# Patient Record
Sex: MALE
[De-identification: names, ages, dates, MRNs, and addresses within clinical notes are randomized per-mention and may not be internally consistent; named-entity substitution may affect disease eponyms.]

---

## 2020-10-15 ENCOUNTER — HOSPITAL ENCOUNTER (EMERGENCY)
Dept: HOSPITAL 53 - M ED | Age: 25
Discharge: HOME | End: 2020-10-15
Payer: SELF-PAY

## 2020-10-15 VITALS — WEIGHT: 156.09 LBS | HEIGHT: 75 IN | BODY MASS INDEX: 19.41 KG/M2

## 2020-10-15 VITALS — SYSTOLIC BLOOD PRESSURE: 131 MMHG | DIASTOLIC BLOOD PRESSURE: 62 MMHG

## 2020-10-15 DIAGNOSIS — R36.9: ICD-10-CM

## 2020-10-15 DIAGNOSIS — J45.909: ICD-10-CM

## 2020-10-15 DIAGNOSIS — R30.0: Primary | ICD-10-CM

## 2020-10-15 DIAGNOSIS — Z88.0: ICD-10-CM

## 2020-10-15 LAB
CHLAMYDIA DNA AMPLIFICATION: NEGATIVE
N GONORRHOEA RRNA SPEC QL NAA+PROBE: POSITIVE

## 2020-10-15 PROCEDURE — 87661 TRICHOMONAS VAGINALIS AMPLIF: CPT

## 2020-10-15 PROCEDURE — 99283 EMERGENCY DEPT VISIT LOW MDM: CPT

## 2020-10-15 PROCEDURE — 96372 THER/PROPH/DIAG INJ SC/IM: CPT

## 2020-12-01 ENCOUNTER — HOSPITAL ENCOUNTER (OUTPATIENT)
Dept: HOSPITAL 53 - M LAB REF | Age: 25
End: 2020-12-01
Attending: PHYSICIAN ASSISTANT
Payer: MEDICAID

## 2020-12-01 DIAGNOSIS — Z11.3: Primary | ICD-10-CM

## 2021-02-22 ENCOUNTER — HOSPITAL ENCOUNTER (EMERGENCY)
Dept: HOSPITAL 53 - M ED | Age: 26
LOS: 1 days | Discharge: HOME | End: 2021-02-23
Payer: COMMERCIAL

## 2021-02-22 VITALS — BODY MASS INDEX: 19.92 KG/M2 | WEIGHT: 142.31 LBS | HEIGHT: 71 IN

## 2021-02-22 DIAGNOSIS — F19.10: ICD-10-CM

## 2021-02-22 DIAGNOSIS — Z88.0: ICD-10-CM

## 2021-02-22 DIAGNOSIS — F22: Primary | ICD-10-CM

## 2021-02-23 VITALS — SYSTOLIC BLOOD PRESSURE: 132 MMHG | DIASTOLIC BLOOD PRESSURE: 62 MMHG

## 2021-02-23 LAB
ALBUMIN SERPL BCG-MCNC: 4.2 GM/DL (ref 3.2–5.2)
ALT SERPL W P-5'-P-CCNC: 32 U/L (ref 12–78)
AMPHETAMINES UR QL SCN: POSITIVE
APAP SERPL-MCNC: < 2 UG/ML (ref 10–30)
BARBITURATES UR QL SCN: NEGATIVE
BENZODIAZ UR QL SCN: NEGATIVE
BILIRUB CONJ SERPL-MCNC: 0.2 MG/DL (ref 0–0.2)
BILIRUB SERPL-MCNC: 0.7 MG/DL (ref 0.2–1)
BUN SERPL-MCNC: 11 MG/DL (ref 7–18)
BZE UR QL SCN: NEGATIVE
CALCIUM SERPL-MCNC: 9.5 MG/DL (ref 8.5–10.1)
CANNABINOIDS UR QL SCN: POSITIVE
CHLORIDE SERPL-SCNC: 101 MEQ/L (ref 98–107)
CO2 SERPL-SCNC: 31 MEQ/L (ref 21–32)
CREAT SERPL-MCNC: 1.24 MG/DL (ref 0.7–1.3)
ETHANOL SERPL-MCNC: < 0.003 % (ref 0–0.01)
GFR SERPL CREATININE-BSD FRML MDRD: > 60 ML/MIN/{1.73_M2} (ref 60–?)
GLUCOSE SERPL-MCNC: 110 MG/DL (ref 70–100)
HCT VFR BLD AUTO: 44 % (ref 42–52)
HGB BLD-MCNC: 14.6 G/DL (ref 13.5–17.5)
MCH RBC QN AUTO: 31.2 PG (ref 27–33)
MCHC RBC AUTO-ENTMCNC: 33.2 G/DL (ref 32–36.5)
MCV RBC AUTO: 94 FL (ref 80–96)
METHADONE UR QL SCN: NEGATIVE
OPIATES UR QL SCN: NEGATIVE
PCP UR QL SCN: NEGATIVE
PLATELET # BLD AUTO: 314 10^3/UL (ref 150–450)
POTASSIUM SERPL-SCNC: 4.9 MEQ/L (ref 3.5–5.1)
PROT SERPL-MCNC: 7.5 GM/DL (ref 6.4–8.2)
RBC # BLD AUTO: 4.68 10^6/UL (ref 4.3–6.1)
SALICYLATES SERPL-MCNC: < 1.7 MG/DL (ref 5–30)
SODIUM SERPL-SCNC: 138 MEQ/L (ref 136–145)
TSH SERPL DL<=0.005 MIU/L-ACNC: 1.25 UIU/ML (ref 0.36–3.74)
WBC # BLD AUTO: 11.7 10^3/UL (ref 4–10)

## 2021-03-04 ENCOUNTER — HOSPITAL ENCOUNTER (EMERGENCY)
Dept: HOSPITAL 53 - M ED | Age: 26
Discharge: HOME | End: 2021-03-04
Payer: COMMERCIAL

## 2021-03-04 VITALS — HEIGHT: 69 IN | BODY MASS INDEX: 20.78 KG/M2 | WEIGHT: 140.3 LBS

## 2021-03-04 VITALS — DIASTOLIC BLOOD PRESSURE: 83 MMHG | SYSTOLIC BLOOD PRESSURE: 131 MMHG

## 2021-03-04 DIAGNOSIS — F19.10: Primary | ICD-10-CM

## 2021-03-04 DIAGNOSIS — F17.200: ICD-10-CM

## 2021-03-04 DIAGNOSIS — Z88.0: ICD-10-CM

## 2021-03-04 LAB
ALBUMIN SERPL BCG-MCNC: 3.9 GM/DL (ref 3.2–5.2)
ALT SERPL W P-5'-P-CCNC: 37 U/L (ref 12–78)
APAP SERPL-MCNC: < 2 UG/ML (ref 10–30)
BILIRUB CONJ SERPL-MCNC: 0.1 MG/DL (ref 0–0.2)
BILIRUB SERPL-MCNC: 0.3 MG/DL (ref 0.2–1)
BUN SERPL-MCNC: 11 MG/DL (ref 7–18)
CALCIUM SERPL-MCNC: 9 MG/DL (ref 8.5–10.1)
CHLORIDE SERPL-SCNC: 106 MEQ/L (ref 98–107)
CO2 SERPL-SCNC: 34 MEQ/L (ref 21–32)
CREAT SERPL-MCNC: 0.84 MG/DL (ref 0.7–1.3)
ETHANOL SERPL-MCNC: < 0.003 % (ref 0–0.01)
GFR SERPL CREATININE-BSD FRML MDRD: > 60 ML/MIN/{1.73_M2} (ref 60–?)
GLUCOSE SERPL-MCNC: 76 MG/DL (ref 70–100)
HCT VFR BLD AUTO: 41.5 % (ref 42–52)
HGB BLD-MCNC: 13.9 G/DL (ref 13.5–17.5)
MCH RBC QN AUTO: 31.5 PG (ref 27–33)
MCHC RBC AUTO-ENTMCNC: 33.5 G/DL (ref 32–36.5)
MCV RBC AUTO: 94.1 FL (ref 80–96)
PLATELET # BLD AUTO: 273 10^3/UL (ref 150–450)
POTASSIUM SERPL-SCNC: 3.7 MEQ/L (ref 3.5–5.1)
PROT SERPL-MCNC: 6.7 GM/DL (ref 6.4–8.2)
RBC # BLD AUTO: 4.41 10^6/UL (ref 4.3–6.1)
SALICYLATES SERPL-MCNC: < 1.7 MG/DL (ref 5–30)
SODIUM SERPL-SCNC: 144 MEQ/L (ref 136–145)
TSH SERPL DL<=0.005 MIU/L-ACNC: 0.7 UIU/ML (ref 0.36–3.74)
WBC # BLD AUTO: 8.2 10^3/UL (ref 4–10)

## 2021-04-22 ENCOUNTER — HOSPITAL ENCOUNTER (EMERGENCY)
Dept: HOSPITAL 53 - M ED | Age: 26
Discharge: HOME | End: 2021-04-22
Payer: COMMERCIAL

## 2021-04-22 VITALS — WEIGHT: 148.37 LBS | BODY MASS INDEX: 20.1 KG/M2 | HEIGHT: 72 IN

## 2021-04-22 VITALS — SYSTOLIC BLOOD PRESSURE: 135 MMHG | DIASTOLIC BLOOD PRESSURE: 57 MMHG
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
## 2021-04-22 DIAGNOSIS — S02.2XXA: Primary | ICD-10-CM
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
## 2021-04-22 DIAGNOSIS — F17.200: ICD-10-CM
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
## 2021-04-22 DIAGNOSIS — Z88.0: ICD-10-CM

## 2021-04-22 DIAGNOSIS — F12.10: ICD-10-CM

## 2021-04-22 NOTE — REPVR
PROCEDURE INFORMATION: 

Exam: CT Maxillofacial Without Contrast 

Exam date and time: 4/22/2021 5:59 PM 

Age: 25 years old 

Clinical indication: Injury or trauma; Other: Hit playing football; Blunt 

trauma (contusions or hematomas); Nose; Additional info: Pain over bridge of 

nose, hit playing football 4-5 days ago 



TECHNIQUE: 

Imaging protocol: Computed tomography images of the face without contrast. 

Radiation optimization: All CT scans at this facility use at least one of these 

dose optimization techniques: automated exposure control; mA and/or kV 

adjustment per patient size (includes targeted exams where dose is matched to 

clinical indication); or iterative reconstruction. 



COMPARISON: 

No relevant prior studies available. 



FINDINGS: 

Orbital cavity: Normal appearing orbits. 

Bones/joints: There is a complex fracture of the left nasal ala with 3 mm of 

inward displacement of the distal fracture fragment.  The fracture line extends 

through the midline nasal bone near the tip In addition in this area there is 

severe soft tissue swelling. The orbital floors appears intact. 

Paranasal sinuses: Clear paranasal sinuses. 

Mastoid air cells: Clear mastoid air cells. 





IMPRESSION: 

Complex fracture of the left nasal ala with 3 mm of offset. The fracture line 

extends through the midline nasal bone is well near the tip. There is marked 

soft tissue swelling left side of the nose. 



Electronically signed by: Gildardo Shah On 04/22/2021  18:45:17 PM

## 2021-05-03 ENCOUNTER — HOSPITAL ENCOUNTER (OUTPATIENT)
Dept: HOSPITAL 53 - M LAB REF | Age: 26
End: 2021-05-03
Attending: NURSE PRACTITIONER
Payer: COMMERCIAL

## 2021-05-03 DIAGNOSIS — F41.9: ICD-10-CM

## 2021-05-03 DIAGNOSIS — Z00.00: Primary | ICD-10-CM

## 2021-05-03 DIAGNOSIS — F17.200: ICD-10-CM

## 2021-05-03 LAB
25(OH)D3 SERPL-MCNC: 24 NG/ML (ref 30–100)
ALBUMIN SERPL BCG-MCNC: 3.8 GM/DL (ref 3.2–5.2)
ALT SERPL W P-5'-P-CCNC: 162 U/L (ref 12–78)
BASOPHILS # BLD AUTO: 0.1 10^3/UL (ref 0–0.2)
BASOPHILS NFR BLD AUTO: 0.7 % (ref 0–1)
BILIRUB SERPL-MCNC: 0.5 MG/DL (ref 0.2–1)
BUN SERPL-MCNC: 11 MG/DL (ref 7–18)
CALCIUM SERPL-MCNC: 9.2 MG/DL (ref 8.5–10.1)
CHLORIDE SERPL-SCNC: 106 MEQ/L (ref 98–107)
CHOLEST SERPL-MCNC: 145 MG/DL (ref ?–200)
CHOLEST/HDLC SERPL: 2.23 {RATIO} (ref ?–5)
CO2 SERPL-SCNC: 33 MEQ/L (ref 21–32)
CREAT SERPL-MCNC: 0.83 MG/DL (ref 0.7–1.3)
EOSINOPHIL # BLD AUTO: 0.1 10^3/UL (ref 0–0.5)
EOSINOPHIL NFR BLD AUTO: 1.6 % (ref 0–3)
GFR SERPL CREATININE-BSD FRML MDRD: > 60 ML/MIN/{1.73_M2} (ref 60–?)
GLUCOSE SERPL-MCNC: 88 MG/DL (ref 70–100)
HCT VFR BLD AUTO: 47.2 % (ref 42–52)
HCV AB SER QL: > 11 INDEX (ref ?–0.8)
HDLC SERPL-MCNC: 65 MG/DL (ref 40–?)
HGB BLD-MCNC: 14.9 G/DL (ref 13.5–17.5)
HIV 1+2 AB+HIV1 P24 AG SERPL QL IA: NEGATIVE
LDLC SERPL CALC-MCNC: 71 MG/DL (ref ?–100)
LYMPHOCYTES # BLD AUTO: 1.3 10^3/UL (ref 1.5–5)
LYMPHOCYTES NFR BLD AUTO: 17.3 % (ref 24–44)
MCH RBC QN AUTO: 31.3 PG (ref 27–33)
MCHC RBC AUTO-ENTMCNC: 31.6 G/DL (ref 32–36.5)
MCV RBC AUTO: 99.2 FL (ref 80–96)
MONOCYTES # BLD AUTO: 0.7 10^3/UL (ref 0–0.8)
MONOCYTES NFR BLD AUTO: 8.9 % (ref 2–8)
NEUTROPHILS # BLD AUTO: 5.5 10^3/UL (ref 1.5–8.5)
NEUTROPHILS NFR BLD AUTO: 71.2 % (ref 36–66)
NONHDLC SERPL-MCNC: 80 MG/DL
PLATELET # BLD AUTO: 335 10^3/UL (ref 150–450)
POTASSIUM SERPL-SCNC: 4.6 MEQ/L (ref 3.5–5.1)
PROT SERPL-MCNC: 7.9 GM/DL (ref 6.4–8.2)
RBC # BLD AUTO: 4.76 10^6/UL (ref 4.3–6.1)
SODIUM SERPL-SCNC: 141 MEQ/L (ref 136–145)
T4 FREE SERPL-MCNC: 1.25 NG/DL (ref 0.76–1.46)
TRIGL SERPL-MCNC: 45 MG/DL (ref ?–150)
TSH SERPL DL<=0.005 MIU/L-ACNC: 0.54 UIU/ML (ref 0.36–3.74)
WBC # BLD AUTO: 7.7 10^3/UL (ref 4–10)

## 2021-05-06 ENCOUNTER — HOSPITAL ENCOUNTER (EMERGENCY)
Dept: HOSPITAL 53 - M ED | Age: 26
LOS: 1 days | Discharge: LEFT BEFORE DISCHARGE | End: 2021-05-07
Payer: COMMERCIAL

## 2021-05-06 VITALS — DIASTOLIC BLOOD PRESSURE: 64 MMHG | SYSTOLIC BLOOD PRESSURE: 132 MMHG

## 2021-05-06 VITALS — WEIGHT: 140.43 LBS | BODY MASS INDEX: 19.02 KG/M2 | HEIGHT: 72 IN

## 2021-05-06 DIAGNOSIS — Z53.21: Primary | ICD-10-CM

## 2021-05-07 ENCOUNTER — HOSPITAL ENCOUNTER (EMERGENCY)
Dept: HOSPITAL 53 - M ED | Age: 26
Discharge: HOME | End: 2021-05-07
Payer: COMMERCIAL

## 2021-05-07 VITALS — HEIGHT: 72 IN | BODY MASS INDEX: 18.72 KG/M2 | WEIGHT: 138.23 LBS

## 2021-05-07 VITALS — SYSTOLIC BLOOD PRESSURE: 109 MMHG | DIASTOLIC BLOOD PRESSURE: 53 MMHG

## 2021-05-07 DIAGNOSIS — J45.909: ICD-10-CM

## 2021-05-07 DIAGNOSIS — S02.2XXA: ICD-10-CM
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
## 2021-05-07 DIAGNOSIS — Z88.0: ICD-10-CM

## 2021-05-07 DIAGNOSIS — L03.90: ICD-10-CM

## 2021-05-07 DIAGNOSIS — F19.10: Primary | ICD-10-CM

## 2021-05-07 LAB
ALBUMIN SERPL BCG-MCNC: 3.9 GM/DL (ref 3.2–5.2)
ALT SERPL W P-5'-P-CCNC: 181 U/L (ref 12–78)
APAP SERPL-MCNC: < 2 UG/ML (ref 10–30)
BILIRUB CONJ SERPL-MCNC: 0.2 MG/DL (ref 0–0.2)
BILIRUB SERPL-MCNC: 0.6 MG/DL (ref 0.2–1)
BUN SERPL-MCNC: 19 MG/DL (ref 7–18)
CALCIUM SERPL-MCNC: 8.7 MG/DL (ref 8.5–10.1)
CHLORIDE SERPL-SCNC: 105 MEQ/L (ref 98–107)
CO2 SERPL-SCNC: 25 MEQ/L (ref 21–32)
CREAT SERPL-MCNC: 1.31 MG/DL (ref 0.7–1.3)
ETHANOL SERPL-MCNC: < 0.003 % (ref 0–0.01)
GFR SERPL CREATININE-BSD FRML MDRD: > 60 ML/MIN/{1.73_M2} (ref 60–?)
GLUCOSE SERPL-MCNC: 44 MG/DL (ref 70–100)
HCT VFR BLD AUTO: 43 % (ref 42–52)
HGB BLD-MCNC: 14.2 G/DL (ref 13.5–17.5)
MCH RBC QN AUTO: 31.6 PG (ref 27–33)
MCHC RBC AUTO-ENTMCNC: 33 G/DL (ref 32–36.5)
MCV RBC AUTO: 95.6 FL (ref 80–96)
PLATELET # BLD AUTO: 358 10^3/UL (ref 150–450)
POTASSIUM SERPL-SCNC: 3.9 MEQ/L (ref 3.5–5.1)
PROT SERPL-MCNC: 8 GM/DL (ref 6.4–8.2)
RBC # BLD AUTO: 4.5 10^6/UL (ref 4.3–6.1)
SALICYLATES SERPL-MCNC: 3.1 MG/DL (ref 5–30)
SODIUM SERPL-SCNC: 140 MEQ/L (ref 136–145)
TSH SERPL DL<=0.005 MIU/L-ACNC: 2.06 UIU/ML (ref 0.36–3.74)
WBC # BLD AUTO: 14.9 10^3/UL (ref 4–10)

## 2021-05-07 PROCEDURE — 93005 ELECTROCARDIOGRAM TRACING: CPT

## 2021-05-07 PROCEDURE — 96375 TX/PRO/DX INJ NEW DRUG ADDON: CPT

## 2021-05-07 PROCEDURE — 70486 CT MAXILLOFACIAL W/O DYE: CPT

## 2021-05-07 PROCEDURE — 85027 COMPLETE CBC AUTOMATED: CPT

## 2021-05-07 PROCEDURE — 99285 EMERGENCY DEPT VISIT HI MDM: CPT

## 2021-05-07 PROCEDURE — 82077 ASSAY SPEC XCP UR&BREATH IA: CPT

## 2021-05-07 PROCEDURE — 96374 THER/PROPH/DIAG INJ IV PUSH: CPT

## 2021-05-07 PROCEDURE — 70450 CT HEAD/BRAIN W/O DYE: CPT

## 2021-05-07 PROCEDURE — 80143 DRUG ASSAY ACETAMINOPHEN: CPT

## 2021-05-07 PROCEDURE — 84443 ASSAY THYROID STIM HORMONE: CPT

## 2021-05-07 PROCEDURE — 80048 BASIC METABOLIC PNL TOTAL CA: CPT

## 2021-05-07 PROCEDURE — 36415 COLL VENOUS BLD VENIPUNCTURE: CPT

## 2021-05-07 PROCEDURE — 80076 HEPATIC FUNCTION PANEL: CPT

## 2021-05-07 NOTE — ECGEPIP
[facility] - ED

                                       

                                       Test Date:    2021

Pat Name:     JUWAN HARO     Department:   

Patient ID:   [id number]                 Room:         -

Gender:       Male                     Technician:   

:          1995               Requested By: KRISTINA GALVAN

Order Number: [id number]     Reading MD:   Ascencion Ballard

                                 Measurements

Intervals                              Axis          

Rate:         73                       P:            81

AK:           176                      QRS:          96

QRSD:         98                       T:            71

QT:           426                                    

QTc:          469                                    

                           Interpretive Statements

Normal sinus rhythm with sinus arrhythmia

Right atrial enlargement

Rightward axis

POOR R WAVE PROGRESSION

BENIGN EARLY REPOLARIZATION

NO PRIORS FOR COMPARISON

Electronically Signed on 2021 18:41:28 EDT by Ascencion Ballard

## 2021-05-07 NOTE — REP
INDICATION:

possible trauma.



COMPARISON:

NONE.



TECHNIQUE:

Helical scanning is acquired and 3 mm axial images re-formatted.  Coronal MPR images

are generated and reviewed.



FINDINGS:

There is a comminuted slightly depressed fracture of the nasal bone with slight

overlying soft tissue swelling.  Inferior maxillary spine appears intact.  Zygomatic

arches are normal.  Maxillary sinuses are clear.  Maxillary and bony is orbital

margins are intact.  Frontal sinuses are clear and its walls are intact.  No ethmoid

or sphenoid sinus opacification is seen.  No skull base fracture is appreciated.  The

mandible is intact.



IMPRESSION:

There is a slightly depressed comminuted fracture of the nasal bone.  No other facial

fracture is visible.





<Electronically signed by Reji Aguayo > 05/07/21 [id number]

## 2021-05-07 NOTE — REP
INDICATION:

possible trauma.



COMPARISON:

None.



TECHNIQUE:

Helical scanning is acquired. 5 mm axial images were reformatted.  Coronal MPR images

were generated.



FINDINGS:

Bone window settings demonstrate an intact bony calvarium.  There is no evidence of

skull fracture or incidental bony calvarial lesion.  The visualized paranasal sinuses

appear clear.  No intraorbital abnormality is seen.  On soft tissue window setting

images; the lateral, third, and fourth ventricles are normal in size and position.

Gray-white differentiation pattern is normal above and below the tentorium.  There are

is no evidence of intracranial hemorrhage.  No mass, edema, infarction, or midline

shift is seen.  No extra-axial fluid collection is appreciated.





IMPRESSION:

Negative noncontrast head CT.





<Electronically signed by Reji Aguayo > 05/07/21 [id number]

## 2021-06-22 ENCOUNTER — HOSPITAL ENCOUNTER (EMERGENCY)
Dept: HOSPITAL 53 - M ED | Age: 26
LOS: 1 days | Discharge: HOME | End: 2021-06-23
Payer: COMMERCIAL

## 2021-06-22 VITALS — BODY MASS INDEX: 18.66 KG/M2 | HEIGHT: 72 IN | WEIGHT: 137.79 LBS

## 2021-06-22 DIAGNOSIS — Z88.0: ICD-10-CM

## 2021-06-22 DIAGNOSIS — L03.114: Primary | ICD-10-CM

## 2021-06-23 VITALS — SYSTOLIC BLOOD PRESSURE: 116 MMHG | DIASTOLIC BLOOD PRESSURE: 48 MMHG

## 2021-08-08 ENCOUNTER — HOSPITAL ENCOUNTER (EMERGENCY)
Dept: HOSPITAL 53 - M ED | Age: 26
Discharge: HOME | End: 2021-08-08
Payer: COMMERCIAL

## 2021-08-08 VITALS — HEIGHT: 72 IN | BODY MASS INDEX: 19 KG/M2 | WEIGHT: 140.3 LBS

## 2021-08-08 VITALS — DIASTOLIC BLOOD PRESSURE: 85 MMHG | SYSTOLIC BLOOD PRESSURE: 138 MMHG

## 2021-08-08 DIAGNOSIS — Z79.899: ICD-10-CM

## 2021-08-08 DIAGNOSIS — F19.10: ICD-10-CM

## 2021-08-08 DIAGNOSIS — J45.909: ICD-10-CM

## 2021-08-08 DIAGNOSIS — F17.200: ICD-10-CM

## 2021-08-08 DIAGNOSIS — Z88.0: ICD-10-CM

## 2021-08-08 DIAGNOSIS — F29: Primary | ICD-10-CM

## 2021-08-11 ENCOUNTER — HOSPITAL ENCOUNTER (EMERGENCY)
Dept: HOSPITAL 53 - M ED | Age: 26
Discharge: HOME | End: 2021-08-11
Payer: COMMERCIAL

## 2021-08-11 VITALS — BODY MASS INDEX: 17.68 KG/M2 | WEIGHT: 130.51 LBS | HEIGHT: 72 IN

## 2021-08-11 VITALS — SYSTOLIC BLOOD PRESSURE: 118 MMHG | DIASTOLIC BLOOD PRESSURE: 59 MMHG

## 2021-08-11 DIAGNOSIS — F19.10: Primary | ICD-10-CM

## 2021-08-11 DIAGNOSIS — Z88.0: ICD-10-CM

## 2021-08-11 LAB
ALBUMIN SERPL BCG-MCNC: 4 GM/DL (ref 3.2–5.2)
ALT SERPL W P-5'-P-CCNC: 97 U/L (ref 12–78)
AMPHETAMINES UR QL SCN: POSITIVE
APAP SERPL-MCNC: < 2 UG/ML (ref 10–30)
BARBITURATES UR QL SCN: NEGATIVE
BASOPHILS # BLD AUTO: 0.1 10^3/UL (ref 0–0.2)
BASOPHILS NFR BLD AUTO: 0.3 % (ref 0–1)
BENZODIAZ UR QL SCN: NEGATIVE
BILIRUB CONJ SERPL-MCNC: 0.3 MG/DL (ref 0–0.2)
BILIRUB SERPL-MCNC: 1 MG/DL (ref 0.2–1)
BUN SERPL-MCNC: 22 MG/DL (ref 7–18)
BZE UR QL SCN: NEGATIVE
CALCIUM SERPL-MCNC: 9 MG/DL (ref 8.5–10.1)
CANNABINOIDS UR QL SCN: POSITIVE
CHLORIDE SERPL-SCNC: 111 MEQ/L (ref 98–107)
CK SERPL-CCNC: 877 U/L (ref 39–308)
CO2 SERPL-SCNC: 27 MEQ/L (ref 21–32)
CREAT SERPL-MCNC: 1.5 MG/DL (ref 0.7–1.3)
EOSINOPHIL # BLD AUTO: 0 10^3/UL (ref 0–0.5)
EOSINOPHIL NFR BLD AUTO: 0.1 % (ref 0–3)
ETHANOL SERPL-MCNC: < 0.003 % (ref 0–0.01)
GFR SERPL CREATININE-BSD FRML MDRD: > 60 ML/MIN/{1.73_M2} (ref 60–?)
GLUCOSE SERPL-MCNC: 30 MG/DL (ref 70–100)
HCT VFR BLD AUTO: 39.1 % (ref 42–52)
HGB BLD-MCNC: 13.3 G/DL (ref 13.5–17.5)
LYMPHOCYTES # BLD AUTO: 2.6 10^3/UL (ref 1.5–5)
LYMPHOCYTES NFR BLD AUTO: 13.4 % (ref 24–44)
MCH RBC QN AUTO: 31.4 PG (ref 27–33)
MCHC RBC AUTO-ENTMCNC: 34 G/DL (ref 32–36.5)
MCV RBC AUTO: 92.2 FL (ref 80–96)
METHADONE UR QL SCN: NEGATIVE
MONOCYTES # BLD AUTO: 1.9 10^3/UL (ref 0–0.8)
MONOCYTES NFR BLD AUTO: 9.7 % (ref 2–8)
NEUTROPHILS # BLD AUTO: 14.5 10^3/UL (ref 1.5–8.5)
NEUTROPHILS NFR BLD AUTO: 76 % (ref 36–66)
OPIATES UR QL SCN: POSITIVE
PCP UR QL SCN: NEGATIVE
PLATELET # BLD AUTO: 341 10^3/UL (ref 150–450)
POTASSIUM SERPL-SCNC: 3.7 MEQ/L (ref 3.5–5.1)
PROT SERPL-MCNC: 7.2 GM/DL (ref 6.4–8.2)
RBC # BLD AUTO: 4.24 10^6/UL (ref 4.3–6.1)
SALICYLATES SERPL-MCNC: < 1.7 MG/DL (ref 5–30)
SODIUM SERPL-SCNC: 149 MEQ/L (ref 136–145)
TSH SERPL DL<=0.005 MIU/L-ACNC: 0.76 UIU/ML (ref 0.36–3.74)
WBC # BLD AUTO: 19.1 10^3/UL (ref 4–10)

## 2021-08-11 PROCEDURE — 94760 N-INVAS EAR/PLS OXIMETRY 1: CPT

## 2021-08-11 PROCEDURE — 80143 DRUG ASSAY ACETAMINOPHEN: CPT

## 2021-08-11 PROCEDURE — 82077 ASSAY SPEC XCP UR&BREATH IA: CPT

## 2021-08-11 PROCEDURE — 96376 TX/PRO/DX INJ SAME DRUG ADON: CPT

## 2021-08-11 PROCEDURE — 93041 RHYTHM ECG TRACING: CPT

## 2021-08-11 PROCEDURE — 80048 BASIC METABOLIC PNL TOTAL CA: CPT

## 2021-08-11 PROCEDURE — 96374 THER/PROPH/DIAG INJ IV PUSH: CPT

## 2021-08-11 PROCEDURE — 99285 EMERGENCY DEPT VISIT HI MDM: CPT

## 2021-08-11 PROCEDURE — 96375 TX/PRO/DX INJ NEW DRUG ADDON: CPT

## 2021-08-11 PROCEDURE — 80307 DRUG TEST PRSMV CHEM ANLYZR: CPT

## 2021-08-11 PROCEDURE — 82550 ASSAY OF CK (CPK): CPT

## 2021-08-11 PROCEDURE — 85025 COMPLETE CBC W/AUTO DIFF WBC: CPT

## 2021-08-11 PROCEDURE — 84443 ASSAY THYROID STIM HORMONE: CPT

## 2021-08-11 PROCEDURE — 51701 INSERT BLADDER CATHETER: CPT

## 2021-08-11 PROCEDURE — 96361 HYDRATE IV INFUSION ADD-ON: CPT

## 2021-08-11 PROCEDURE — 80076 HEPATIC FUNCTION PANEL: CPT

## 2021-08-22 ENCOUNTER — HOSPITAL ENCOUNTER (EMERGENCY)
Dept: HOSPITAL 53 - M ED | Age: 26
Discharge: HOME | End: 2021-08-22
Payer: COMMERCIAL

## 2021-08-22 VITALS — WEIGHT: 138.67 LBS | HEIGHT: 72 IN | BODY MASS INDEX: 18.78 KG/M2

## 2021-08-22 VITALS — DIASTOLIC BLOOD PRESSURE: 65 MMHG | SYSTOLIC BLOOD PRESSURE: 128 MMHG

## 2021-08-22 DIAGNOSIS — L01.01: Primary | ICD-10-CM

## 2021-08-22 DIAGNOSIS — Z88.0: ICD-10-CM

## 2021-08-22 DIAGNOSIS — F42.4: ICD-10-CM

## 2021-08-22 DIAGNOSIS — F11.10: ICD-10-CM

## 2021-08-23 ENCOUNTER — HOSPITAL ENCOUNTER (EMERGENCY)
Dept: HOSPITAL 53 - M ED | Age: 26
Discharge: LEFT BEFORE DISCHARGE | End: 2021-08-23
Payer: COMMERCIAL

## 2021-08-23 VITALS — HEIGHT: 72 IN | WEIGHT: 135.28 LBS | BODY MASS INDEX: 18.32 KG/M2

## 2021-08-23 VITALS — DIASTOLIC BLOOD PRESSURE: 86 MMHG | SYSTOLIC BLOOD PRESSURE: 118 MMHG

## 2021-08-23 DIAGNOSIS — Z88.0: ICD-10-CM

## 2021-08-23 DIAGNOSIS — Z53.9: ICD-10-CM

## 2021-08-23 DIAGNOSIS — F19.10: Primary | ICD-10-CM

## 2021-08-23 DIAGNOSIS — F17.200: ICD-10-CM

## 2021-08-23 DIAGNOSIS — F11.10: ICD-10-CM

## 2021-09-11 ENCOUNTER — HOSPITAL ENCOUNTER (EMERGENCY)
Dept: HOSPITAL 53 - M ED | Age: 26
LOS: 1 days | Discharge: HOME | End: 2021-09-12
Payer: COMMERCIAL

## 2021-09-11 VITALS — BODY MASS INDEX: 20.36 KG/M2 | HEIGHT: 72 IN | WEIGHT: 150.31 LBS

## 2021-09-11 DIAGNOSIS — F19.10: Primary | ICD-10-CM

## 2021-09-11 DIAGNOSIS — Z88.0: ICD-10-CM

## 2021-09-11 DIAGNOSIS — F10.10: ICD-10-CM

## 2021-09-11 LAB
ALBUMIN SERPL BCG-MCNC: 3.7 GM/DL (ref 3.2–5.2)
ALT SERPL W P-5'-P-CCNC: 174 U/L (ref 12–78)
APAP SERPL-MCNC: < 2 UG/ML (ref 10–30)
BASOPHILS # BLD AUTO: 0.1 10^3/UL (ref 0–0.2)
BASOPHILS NFR BLD AUTO: 0.8 % (ref 0–1)
BILIRUB CONJ SERPL-MCNC: 0.2 MG/DL (ref 0–0.2)
BILIRUB SERPL-MCNC: 0.5 MG/DL (ref 0.2–1)
BUN SERPL-MCNC: 14 MG/DL (ref 7–18)
CALCIUM SERPL-MCNC: 8.8 MG/DL (ref 8.5–10.1)
CHLORIDE SERPL-SCNC: 109 MEQ/L (ref 98–107)
CK SERPL-CCNC: 279 U/L (ref 39–308)
CO2 SERPL-SCNC: 27 MEQ/L (ref 21–32)
CREAT SERPL-MCNC: 1.02 MG/DL (ref 0.7–1.3)
EOSINOPHIL # BLD AUTO: 0.2 10^3/UL (ref 0–0.5)
EOSINOPHIL NFR BLD AUTO: 3.5 % (ref 0–3)
ETHANOL SERPL-MCNC: 0 % (ref 0–0.01)
GFR SERPL CREATININE-BSD FRML MDRD: > 60 ML/MIN/{1.73_M2} (ref 60–?)
GLUCOSE SERPL-MCNC: 95 MG/DL (ref 70–100)
HCT VFR BLD AUTO: 44.1 % (ref 42–52)
HGB BLD-MCNC: 14.4 G/DL (ref 13.5–17.5)
LYMPHOCYTES # BLD AUTO: 1.4 10^3/UL (ref 1.5–5)
LYMPHOCYTES NFR BLD AUTO: 22.6 % (ref 24–44)
MCH RBC QN AUTO: 31 PG (ref 27–33)
MCHC RBC AUTO-ENTMCNC: 32.7 G/DL (ref 32–36.5)
MCV RBC AUTO: 94.8 FL (ref 80–96)
MONOCYTES # BLD AUTO: 0.7 10^3/UL (ref 0–0.8)
MONOCYTES NFR BLD AUTO: 11 % (ref 2–8)
NEUTROPHILS # BLD AUTO: 3.7 10^3/UL (ref 1.5–8.5)
NEUTROPHILS NFR BLD AUTO: 61.6 % (ref 36–66)
PLATELET # BLD AUTO: 316 10^3/UL (ref 150–450)
POTASSIUM SERPL-SCNC: 4 MEQ/L (ref 3.5–5.1)
PROT SERPL-MCNC: 7.2 GM/DL (ref 6.4–8.2)
RBC # BLD AUTO: 4.65 10^6/UL (ref 4.3–6.1)
SALICYLATES SERPL-MCNC: < 1.7 MG/DL (ref 5–30)
SODIUM SERPL-SCNC: 143 MEQ/L (ref 136–145)
TSH SERPL DL<=0.005 MIU/L-ACNC: 1.08 UIU/ML (ref 0.36–3.74)
WBC # BLD AUTO: 6 10^3/UL (ref 4–10)

## 2021-09-11 PROCEDURE — 99285 EMERGENCY DEPT VISIT HI MDM: CPT

## 2021-09-11 PROCEDURE — 94760 N-INVAS EAR/PLS OXIMETRY 1: CPT

## 2021-09-11 PROCEDURE — 96360 HYDRATION IV INFUSION INIT: CPT

## 2021-09-11 PROCEDURE — 82550 ASSAY OF CK (CPK): CPT

## 2021-09-11 PROCEDURE — 80076 HEPATIC FUNCTION PANEL: CPT

## 2021-09-11 PROCEDURE — 51701 INSERT BLADDER CATHETER: CPT

## 2021-09-11 PROCEDURE — 80307 DRUG TEST PRSMV CHEM ANLYZR: CPT

## 2021-09-11 PROCEDURE — 96361 HYDRATE IV INFUSION ADD-ON: CPT

## 2021-09-11 PROCEDURE — 93041 RHYTHM ECG TRACING: CPT

## 2021-09-11 PROCEDURE — 84443 ASSAY THYROID STIM HORMONE: CPT

## 2021-09-11 PROCEDURE — 80143 DRUG ASSAY ACETAMINOPHEN: CPT

## 2021-09-11 PROCEDURE — 93005 ELECTROCARDIOGRAM TRACING: CPT

## 2021-09-11 PROCEDURE — 96372 THER/PROPH/DIAG INJ SC/IM: CPT

## 2021-09-11 PROCEDURE — 85025 COMPLETE CBC W/AUTO DIFF WBC: CPT

## 2021-09-11 PROCEDURE — 82077 ASSAY SPEC XCP UR&BREATH IA: CPT

## 2021-09-11 PROCEDURE — 80048 BASIC METABOLIC PNL TOTAL CA: CPT

## 2021-09-12 VITALS — DIASTOLIC BLOOD PRESSURE: 50 MMHG | SYSTOLIC BLOOD PRESSURE: 101 MMHG

## 2021-09-12 LAB
AMPHETAMINES UR QL SCN: POSITIVE
BARBITURATES UR QL SCN: NEGATIVE
BENZODIAZ UR QL SCN: NEGATIVE
BZE UR QL SCN: NEGATIVE
CANNABINOIDS UR QL SCN: POSITIVE
METHADONE UR QL SCN: NEGATIVE
OPIATES UR QL SCN: POSITIVE
PCP UR QL SCN: NEGATIVE

## 2021-09-12 NOTE — ECGEPIP
[facility] - ED

                                       

                                       Test Date:    2021

Pat Name:     JUWAN HARO     Department:   

Patient ID:   [id number]                 Room:         -

Gender:       Male                     Technician:   

:          1995               Requested By: GIAN SELF

Order Number: [id number]     Reading MD:   Amy Srivastava

                                 Measurements

Intervals                              Axis          

Rate:         88                       P:            84

AL:           154                      QRS:          100

QRSD:         90                       T:            63

QT:           382                                    

QTc:          462                                    

                           Interpretive Statements

Normal sinus rhythm

Biatrial enlargement

Rightward axis

Pulmonary disease pattern

No prior

Electronically Signed on 2021 20:03:38 EDT by Amy Srivastava

## 2021-09-24 ENCOUNTER — HOSPITAL ENCOUNTER (EMERGENCY)
Dept: HOSPITAL 53 - M ED | Age: 26
Discharge: LEFT BEFORE DISCHARGE | End: 2021-09-24
Payer: COMMERCIAL

## 2021-09-24 VITALS — BODY MASS INDEX: 20.07 KG/M2 | HEIGHT: 72 IN | WEIGHT: 148.15 LBS

## 2021-09-24 VITALS — SYSTOLIC BLOOD PRESSURE: 134 MMHG | DIASTOLIC BLOOD PRESSURE: 69 MMHG

## 2021-09-24 DIAGNOSIS — F17.200: ICD-10-CM

## 2021-09-24 DIAGNOSIS — Z53.9: ICD-10-CM

## 2021-09-24 DIAGNOSIS — Z88.0: ICD-10-CM

## 2021-09-24 DIAGNOSIS — R45.1: Primary | ICD-10-CM

## 2021-09-24 DIAGNOSIS — F19.10: ICD-10-CM

## 2021-09-24 LAB
ALBUMIN SERPL BCG-MCNC: 3.6 GM/DL (ref 3.2–5.2)
ALT SERPL W P-5'-P-CCNC: 140 U/L (ref 12–78)
AMPHETAMINES UR QL SCN: POSITIVE
APAP SERPL-MCNC: 2.2 UG/ML (ref 10–30)
BARBITURATES UR QL SCN: NEGATIVE
BENZODIAZ UR QL SCN: NEGATIVE
BILIRUB CONJ SERPL-MCNC: 0.2 MG/DL (ref 0–0.2)
BILIRUB SERPL-MCNC: 0.5 MG/DL (ref 0.2–1)
BUN SERPL-MCNC: 16 MG/DL (ref 7–18)
BZE UR QL SCN: NEGATIVE
CALCIUM SERPL-MCNC: 8.5 MG/DL (ref 8.5–10.1)
CANNABINOIDS UR QL SCN: NEGATIVE
CHLORIDE SERPL-SCNC: 106 MEQ/L (ref 98–107)
CO2 SERPL-SCNC: 28 MEQ/L (ref 21–32)
CREAT SERPL-MCNC: 1.22 MG/DL (ref 0.7–1.3)
ETHANOL SERPL-MCNC: 0 % (ref 0–0.01)
GFR SERPL CREATININE-BSD FRML MDRD: > 60 ML/MIN/{1.73_M2} (ref 60–?)
GLUCOSE SERPL-MCNC: 46 MG/DL (ref 70–100)
HCT VFR BLD AUTO: 36.4 % (ref 42–52)
HGB BLD-MCNC: 12.3 G/DL (ref 13.5–17.5)
MCH RBC QN AUTO: 31.5 PG (ref 27–33)
MCHC RBC AUTO-ENTMCNC: 33.8 G/DL (ref 32–36.5)
MCV RBC AUTO: 93.1 FL (ref 80–96)
METHADONE UR QL SCN: NEGATIVE
OPIATES UR QL SCN: POSITIVE
PCP UR QL SCN: NEGATIVE
PLATELET # BLD AUTO: 307 10^3/UL (ref 150–450)
POTASSIUM SERPL-SCNC: 3.9 MEQ/L (ref 3.5–5.1)
PROT SERPL-MCNC: 6.4 GM/DL (ref 6.4–8.2)
RBC # BLD AUTO: 3.91 10^6/UL (ref 4.3–6.1)
SALICYLATES SERPL-MCNC: < 1.7 MG/DL (ref 5–30)
SODIUM SERPL-SCNC: 142 MEQ/L (ref 136–145)
TSH SERPL DL<=0.005 MIU/L-ACNC: 1.63 UIU/ML (ref 0.36–3.74)
WBC # BLD AUTO: 12.4 10^3/UL (ref 4–10)

## 2021-09-28 ENCOUNTER — HOSPITAL ENCOUNTER (EMERGENCY)
Dept: HOSPITAL 53 - M ED | Age: 26
Discharge: HOME | End: 2021-09-28
Payer: COMMERCIAL

## 2021-09-28 VITALS — HEIGHT: 72 IN | BODY MASS INDEX: 19.68 KG/M2 | WEIGHT: 145.31 LBS

## 2021-09-28 VITALS — SYSTOLIC BLOOD PRESSURE: 120 MMHG | DIASTOLIC BLOOD PRESSURE: 56 MMHG

## 2021-09-28 DIAGNOSIS — Z88.0: ICD-10-CM

## 2021-09-28 DIAGNOSIS — F19.10: Primary | ICD-10-CM

## 2021-10-01 ENCOUNTER — HOSPITAL ENCOUNTER (EMERGENCY)
Dept: HOSPITAL 53 - M ED | Age: 26
Discharge: HOME | End: 2021-10-01
Payer: COMMERCIAL

## 2021-10-01 VITALS — DIASTOLIC BLOOD PRESSURE: 58 MMHG | SYSTOLIC BLOOD PRESSURE: 119 MMHG

## 2021-10-01 DIAGNOSIS — F11.10: Primary | ICD-10-CM

## 2021-10-01 DIAGNOSIS — Z88.0: ICD-10-CM

## 2021-10-01 LAB
ALBUMIN SERPL BCG-MCNC: 3.6 GM/DL (ref 3.2–5.2)
ALT SERPL W P-5'-P-CCNC: 172 U/L (ref 12–78)
AMPHETAMINES UR QL SCN: POSITIVE
APAP SERPL-MCNC: < 2 UG/ML (ref 10–30)
BARBITURATES UR QL SCN: NEGATIVE
BENZODIAZ UR QL SCN: POSITIVE
BILIRUB CONJ SERPL-MCNC: < 0.1 MG/DL (ref 0–0.2)
BILIRUB SERPL-MCNC: 0.3 MG/DL (ref 0.2–1)
BUN SERPL-MCNC: 16 MG/DL (ref 7–18)
BZE UR QL SCN: NEGATIVE
CALCIUM SERPL-MCNC: 8.7 MG/DL (ref 8.5–10.1)
CANNABINOIDS UR QL SCN: NEGATIVE
CHLORIDE SERPL-SCNC: 107 MEQ/L (ref 98–107)
CO2 SERPL-SCNC: 26 MEQ/L (ref 21–32)
CREAT SERPL-MCNC: 1.12 MG/DL (ref 0.7–1.3)
ETHANOL SERPL-MCNC: < 0.003 % (ref 0–0.01)
GFR SERPL CREATININE-BSD FRML MDRD: > 60 ML/MIN/{1.73_M2} (ref 60–?)
GLUCOSE SERPL-MCNC: 86 MG/DL (ref 70–100)
HCT VFR BLD AUTO: 40.4 % (ref 42–52)
HGB BLD-MCNC: 13.3 G/DL (ref 13.5–17.5)
MCH RBC QN AUTO: 31.4 PG (ref 27–33)
MCHC RBC AUTO-ENTMCNC: 32.9 G/DL (ref 32–36.5)
MCV RBC AUTO: 95.5 FL (ref 80–96)
METHADONE UR QL SCN: NEGATIVE
OPIATES UR QL SCN: POSITIVE
PCP UR QL SCN: NEGATIVE
PLATELET # BLD AUTO: 333 10^3/UL (ref 150–450)
POTASSIUM SERPL-SCNC: 4.1 MEQ/L (ref 3.5–5.1)
PROT SERPL-MCNC: 7 GM/DL (ref 6.4–8.2)
RBC # BLD AUTO: 4.23 10^6/UL (ref 4.3–6.1)
SALICYLATES SERPL-MCNC: < 1.7 MG/DL (ref 5–30)
SODIUM SERPL-SCNC: 141 MEQ/L (ref 136–145)
TSH SERPL DL<=0.005 MIU/L-ACNC: 0.48 UIU/ML (ref 0.36–3.74)
WBC # BLD AUTO: 8.4 10^3/UL (ref 4–10)

## 2021-10-02 ENCOUNTER — HOSPITAL ENCOUNTER (EMERGENCY)
Dept: HOSPITAL 53 - M ED | Age: 26
Discharge: HOME | End: 2021-10-02
Payer: COMMERCIAL

## 2021-10-02 VITALS — HEIGHT: 71 IN | WEIGHT: 160.34 LBS | BODY MASS INDEX: 22.45 KG/M2

## 2021-10-02 VITALS — DIASTOLIC BLOOD PRESSURE: 59 MMHG | SYSTOLIC BLOOD PRESSURE: 136 MMHG

## 2021-10-02 DIAGNOSIS — F19.10: Primary | ICD-10-CM

## 2021-10-02 DIAGNOSIS — F17.200: ICD-10-CM

## 2021-10-02 DIAGNOSIS — Z88.0: ICD-10-CM

## 2021-10-02 DIAGNOSIS — F32.9: ICD-10-CM

## 2021-10-02 DIAGNOSIS — I45.19: ICD-10-CM

## 2021-10-02 LAB
ALBUMIN SERPL BCG-MCNC: 3.6 GM/DL (ref 3.2–5.2)
ALT SERPL W P-5'-P-CCNC: 171 U/L (ref 12–78)
APAP SERPL-MCNC: < 2 UG/ML (ref 10–30)
BASOPHILS # BLD AUTO: 0 10^3/UL (ref 0–0.2)
BASOPHILS NFR BLD AUTO: 0.2 % (ref 0–1)
BILIRUB CONJ SERPL-MCNC: 0.1 MG/DL (ref 0–0.2)
BILIRUB SERPL-MCNC: 0.3 MG/DL (ref 0.2–1)
BUN SERPL-MCNC: 16 MG/DL (ref 7–18)
CALCIUM SERPL-MCNC: 8.8 MG/DL (ref 8.5–10.1)
CHLORIDE SERPL-SCNC: 106 MEQ/L (ref 98–107)
CK SERPL-CCNC: 714 U/L (ref 39–308)
CO2 SERPL-SCNC: 20 MEQ/L (ref 21–32)
CREAT SERPL-MCNC: 1.2 MG/DL (ref 0.7–1.3)
EOSINOPHIL # BLD AUTO: 0.1 10^3/UL (ref 0–0.5)
EOSINOPHIL NFR BLD AUTO: 0.9 % (ref 0–3)
ETHANOL SERPL-MCNC: 0 % (ref 0–0.01)
GFR SERPL CREATININE-BSD FRML MDRD: > 60 ML/MIN/{1.73_M2} (ref 60–?)
GLUCOSE SERPL-MCNC: 81 MG/DL (ref 70–100)
HCT VFR BLD AUTO: 39.7 % (ref 42–52)
HGB BLD-MCNC: 13.3 G/DL (ref 13.5–17.5)
LYMPHOCYTES # BLD AUTO: 1.8 10^3/UL (ref 1.5–5)
LYMPHOCYTES NFR BLD AUTO: 14.4 % (ref 24–44)
MCH RBC QN AUTO: 31.7 PG (ref 27–33)
MCHC RBC AUTO-ENTMCNC: 33.5 G/DL (ref 32–36.5)
MCV RBC AUTO: 94.5 FL (ref 80–96)
MONOCYTES # BLD AUTO: 1.1 10^3/UL (ref 0–0.8)
MONOCYTES NFR BLD AUTO: 8.5 % (ref 2–8)
NEUTROPHILS # BLD AUTO: 9.4 10^3/UL (ref 1.5–8.5)
NEUTROPHILS NFR BLD AUTO: 75.3 % (ref 36–66)
PLATELET # BLD AUTO: 306 10^3/UL (ref 150–450)
POTASSIUM SERPL-SCNC: 3.6 MEQ/L (ref 3.5–5.1)
PROT SERPL-MCNC: 7 GM/DL (ref 6.4–8.2)
RBC # BLD AUTO: 4.2 10^6/UL (ref 4.3–6.1)
SALICYLATES SERPL-MCNC: < 1.7 MG/DL (ref 5–30)
SODIUM SERPL-SCNC: 141 MEQ/L (ref 136–145)
TSH SERPL DL<=0.005 MIU/L-ACNC: 0.76 UIU/ML (ref 0.36–3.74)
WBC # BLD AUTO: 12.5 10^3/UL (ref 4–10)

## 2021-10-03 NOTE — ECGEPIP
[facility] - ED

                                       

                                       Test Date:    2021-10-02

Pat Name:     JUWAN DURÁN      Department:   

Patient ID:   [id number]                 Room:         -

Gender:       Male                     Technician:   ER

:          1995               Requested By: BRANDY CHEW

Order Number: [id number]     Reading MD:   Ascencion Ballard

                                 Measurements

Intervals                              Axis          

Rate:         83                       P:            76

CT:           168                      QRS:          86

QRSD:         88                       T:            64

QT:           386                                    

QTc:          453                                    

                           Interpretive Statements

Normal sinus rhythm

Biatrial enlargement

INCOMPLETE RIGHT BUNDLE BRANCH BLOCK

SIMILAR TO 21

Electronically Signed on 10-3-2021 5:34:39 EDT by Ascencion Ballard

## 2021-10-10 ENCOUNTER — HOSPITAL ENCOUNTER (INPATIENT)
Dept: HOSPITAL 53 - M ED | Age: 26
LOS: 3 days | Discharge: HOME | DRG: 751 | End: 2021-10-13
Attending: PSYCHIATRY & NEUROLOGY | Admitting: PSYCHIATRY & NEUROLOGY
Payer: COMMERCIAL

## 2021-10-10 VITALS — HEIGHT: 72 IN | WEIGHT: 132.28 LBS | BODY MASS INDEX: 17.92 KG/M2

## 2021-10-10 VITALS — DIASTOLIC BLOOD PRESSURE: 69 MMHG | SYSTOLIC BLOOD PRESSURE: 120 MMHG

## 2021-10-10 DIAGNOSIS — F19.159: ICD-10-CM

## 2021-10-10 DIAGNOSIS — F19.139: ICD-10-CM

## 2021-10-10 DIAGNOSIS — Z79.899: ICD-10-CM

## 2021-10-10 DIAGNOSIS — F15.10: ICD-10-CM

## 2021-10-10 DIAGNOSIS — F12.10: ICD-10-CM

## 2021-10-10 DIAGNOSIS — F14.10: ICD-10-CM

## 2021-10-10 DIAGNOSIS — F17.200: ICD-10-CM

## 2021-10-10 DIAGNOSIS — Z88.0: ICD-10-CM

## 2021-10-10 DIAGNOSIS — F29: Primary | ICD-10-CM

## 2021-10-10 LAB
ALBUMIN SERPL BCG-MCNC: 3.7 GM/DL (ref 3.2–5.2)
ALT SERPL W P-5'-P-CCNC: 172 U/L (ref 12–78)
AMPHETAMINES UR QL SCN: POSITIVE
APAP SERPL-MCNC: < 2 UG/ML (ref 10–30)
BARBITURATES UR QL SCN: NEGATIVE
BENZODIAZ UR QL SCN: NEGATIVE
BILIRUB CONJ SERPL-MCNC: 0.2 MG/DL (ref 0–0.2)
BILIRUB SERPL-MCNC: 0.5 MG/DL (ref 0.2–1)
BUN SERPL-MCNC: 19 MG/DL (ref 7–18)
BZE UR QL SCN: NEGATIVE
CALCIUM SERPL-MCNC: 8.7 MG/DL (ref 8.5–10.1)
CANNABINOIDS UR QL SCN: POSITIVE
CHLORIDE SERPL-SCNC: 105 MEQ/L (ref 98–107)
CK SERPL-CCNC: 248 U/L (ref 39–308)
CO2 SERPL-SCNC: 29 MEQ/L (ref 21–32)
CREAT SERPL-MCNC: 1.01 MG/DL (ref 0.7–1.3)
ETHANOL SERPL-MCNC: < 0.003 % (ref 0–0.01)
GFR SERPL CREATININE-BSD FRML MDRD: > 60 ML/MIN/{1.73_M2} (ref 60–?)
GLUCOSE SERPL-MCNC: 80 MG/DL (ref 70–100)
HCT VFR BLD AUTO: 39.2 % (ref 42–52)
HGB BLD-MCNC: 13.2 G/DL (ref 13.5–17.5)
MCH RBC QN AUTO: 31.7 PG (ref 27–33)
MCHC RBC AUTO-ENTMCNC: 33.7 G/DL (ref 32–36.5)
MCV RBC AUTO: 94.2 FL (ref 80–96)
METHADONE UR QL SCN: NEGATIVE
OPIATES UR QL SCN: NEGATIVE
PCP UR QL SCN: NEGATIVE
PLATELET # BLD AUTO: 293 10^3/UL (ref 150–450)
POTASSIUM SERPL-SCNC: 4.3 MEQ/L (ref 3.5–5.1)
PROT SERPL-MCNC: 7.2 GM/DL (ref 6.4–8.2)
RBC # BLD AUTO: 4.16 10^6/UL (ref 4.3–6.1)
RSV RNA NPH QL NAA+PROBE: NEGATIVE
SALICYLATES SERPL-MCNC: < 1.7 MG/DL (ref 5–30)
SODIUM SERPL-SCNC: 140 MEQ/L (ref 136–145)
TSH SERPL DL<=0.005 MIU/L-ACNC: 8.03 UIU/ML (ref 0.36–3.74)
WBC # BLD AUTO: 11.4 10^3/UL (ref 4–10)

## 2021-10-11 VITALS — SYSTOLIC BLOOD PRESSURE: 133 MMHG | DIASTOLIC BLOOD PRESSURE: 60 MMHG

## 2021-10-11 VITALS — DIASTOLIC BLOOD PRESSURE: 60 MMHG | SYSTOLIC BLOOD PRESSURE: 128 MMHG
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
## 2021-10-12 VITALS — SYSTOLIC BLOOD PRESSURE: 112 MMHG | DIASTOLIC BLOOD PRESSURE: 65 MMHG

## 2021-10-12 VITALS — SYSTOLIC BLOOD PRESSURE: 124 MMHG | DIASTOLIC BLOOD PRESSURE: 59 MMHG

## 2021-10-12 VITALS — DIASTOLIC BLOOD PRESSURE: 83 MMHG | SYSTOLIC BLOOD PRESSURE: 143 MMHG

## 2021-10-12 VITALS — SYSTOLIC BLOOD PRESSURE: 100 MMHG | DIASTOLIC BLOOD PRESSURE: 54 MMHG
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
## 2021-10-13 VITALS — SYSTOLIC BLOOD PRESSURE: 127 MMHG | DIASTOLIC BLOOD PRESSURE: 60 MMHG
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
## 2021-10-31 ENCOUNTER — HOSPITAL ENCOUNTER (EMERGENCY)
Dept: HOSPITAL 53 - M ED | Age: 26
Discharge: HOME | End: 2021-10-31
Payer: COMMERCIAL

## 2021-10-31 VITALS — WEIGHT: 140.3 LBS | BODY MASS INDEX: 19 KG/M2 | HEIGHT: 72 IN

## 2021-10-31 VITALS — DIASTOLIC BLOOD PRESSURE: 56 MMHG | SYSTOLIC BLOOD PRESSURE: 129 MMHG

## 2021-10-31 DIAGNOSIS — Z88.0: ICD-10-CM

## 2021-10-31 DIAGNOSIS — T40.1X1A: Primary | ICD-10-CM

## 2021-11-07 ENCOUNTER — HOSPITAL ENCOUNTER (EMERGENCY)
Dept: HOSPITAL 53 - M ED | Age: 26
LOS: 2 days | Discharge: HOME | End: 2021-11-09
Payer: COMMERCIAL

## 2021-11-07 VITALS — WEIGHT: 140.3 LBS | BODY MASS INDEX: 19 KG/M2 | HEIGHT: 72 IN

## 2021-11-07 DIAGNOSIS — F19.10: Primary | ICD-10-CM

## 2021-11-07 DIAGNOSIS — Z88.0: ICD-10-CM

## 2021-11-07 LAB
ALBUMIN SERPL BCG-MCNC: 3.4 GM/DL (ref 3.2–5.2)
ALT SERPL W P-5'-P-CCNC: 70 U/L (ref 12–78)
APAP SERPL-MCNC: < 2 UG/ML (ref 10–30)
BILIRUB CONJ SERPL-MCNC: 0.1 MG/DL (ref 0–0.2)
BILIRUB SERPL-MCNC: 0.3 MG/DL (ref 0.2–1)
BUN SERPL-MCNC: 14 MG/DL (ref 7–18)
CALCIUM SERPL-MCNC: 8.6 MG/DL (ref 8.5–10.1)
CHLORIDE SERPL-SCNC: 109 MEQ/L (ref 98–107)
CO2 SERPL-SCNC: 30 MEQ/L (ref 21–32)
CREAT SERPL-MCNC: 0.9 MG/DL (ref 0.7–1.3)
ETHANOL SERPL-MCNC: < 0.003 % (ref 0–0.01)
GFR SERPL CREATININE-BSD FRML MDRD: > 60 ML/MIN/{1.73_M2} (ref 60–?)
GLUCOSE SERPL-MCNC: 84 MG/DL (ref 70–100)
HCT VFR BLD AUTO: 36.8 % (ref 42–52)
HGB BLD-MCNC: 12 G/DL (ref 13.5–17.5)
MCH RBC QN AUTO: 31.3 PG (ref 27–33)
MCHC RBC AUTO-ENTMCNC: 32.6 G/DL (ref 32–36.5)
MCV RBC AUTO: 95.8 FL (ref 80–96)
PLATELET # BLD AUTO: 294 10^3/UL (ref 150–450)
POTASSIUM SERPL-SCNC: 4 MEQ/L (ref 3.5–5.1)
PROT SERPL-MCNC: 6.6 GM/DL (ref 6.4–8.2)
RBC # BLD AUTO: 3.84 10^6/UL (ref 4.3–6.1)
SALICYLATES SERPL-MCNC: < 1.7 MG/DL (ref 5–30)
SODIUM SERPL-SCNC: 143 MEQ/L (ref 136–145)
TSH SERPL DL<=0.005 MIU/L-ACNC: 3.05 UIU/ML (ref 0.36–3.74)
WBC # BLD AUTO: 10.4 10^3/UL (ref 4–10)

## 2021-11-07 PROCEDURE — 80143 DRUG ASSAY ACETAMINOPHEN: CPT

## 2021-11-07 PROCEDURE — 80076 HEPATIC FUNCTION PANEL: CPT

## 2021-11-07 PROCEDURE — 99284 EMERGENCY DEPT VISIT MOD MDM: CPT

## 2021-11-07 PROCEDURE — 85027 COMPLETE CBC AUTOMATED: CPT

## 2021-11-07 PROCEDURE — 96372 THER/PROPH/DIAG INJ SC/IM: CPT

## 2021-11-07 PROCEDURE — 80048 BASIC METABOLIC PNL TOTAL CA: CPT

## 2021-11-07 PROCEDURE — 82550 ASSAY OF CK (CPK): CPT

## 2021-11-07 PROCEDURE — 82077 ASSAY SPEC XCP UR&BREATH IA: CPT

## 2021-11-07 PROCEDURE — 87631 RESP VIRUS 3-5 TARGETS: CPT

## 2021-11-07 PROCEDURE — 80307 DRUG TEST PRSMV CHEM ANLYZR: CPT

## 2021-11-07 PROCEDURE — 84443 ASSAY THYROID STIM HORMONE: CPT

## 2021-11-07 PROCEDURE — 93005 ELECTROCARDIOGRAM TRACING: CPT

## 2021-11-07 NOTE — CCD
Summarization Of Episode

                             Created on: 2021



JUWAN VILLELA

External Reference #: [id number]

: 1995

Sex: Undifferentiated



Demographics





                          Address                   [address] [place]  [address]

 

                          Home Phone                [phone]

 

                          Preferred Language        English

 

                          Marital Status            Unknown

 

                          Judaism Affiliation     Unknown

 

                          Race                      Unknown

 

                          Ethnic Group              YES





Author





                          Author                    [facility]

 

                          Organization              [facility]

 

                          Address                   Unknown

 

                          Phone                     Unavailable







Support





                Name            Relationship    Address         Phone

 

                UE              Next Of Kin     Unknown         Unavailable

 

                    CN CONSTRUCTION    Next Of Kin         UNKNOWN

[place], [place]  [id number] [phone]

 

                    ARTURO HARO    Next Of Kin         -

                          -, -  -                   [phone]

 

                Brenda Haro   Next Of Kin     Unknown         Unavailable

 

                    NURSE, DUTY ON      Next Of Kin         [address] [facility]

[place]  [address]                   Unavailable

 

                    Danilo FNP,  Radha Next Of Kin         [address] [place]  [address]                    [phone]

 

                    ARTURO HARO    ECON                -

                          -, -  -                   [phone]







Care Team Providers





                    Care Team Member Name Role                Phone

 

                    KANDI Ray   Unavailable         Unavailable

 

                    Danilo, A Radha FNP Unavailable         Unavailable

 

                    Danilo, A Radha FNP Unavailable         Unavailable

 

                    Danilo, A Radha FNP Unavailable         Unavailable

 

                    Danilo, A Radha FNP Unavailable         Unavailable

 

                    Danilo, A Radha FNP Unavailable         Unavailable

 

                    Danilo, A Radha FNP Unavailable         Unavailable

 

                    Danilo, A Radha FNP Unavailable         Unavailable

 

                    Danilo, A Radha FNP Unavailable         Unavailable

 

                    Danilo, A Radha FNP Unavailable         Unavailable

 

                    Danilo, A Radha FNP Unavailable         Unavailable

 

                    Danilo, A Radha FNP Unavailable         Unavailable

 

                    Danilo, A Radha FNP Unavailable         Unavailable

 

                    Danilo, A Radha FNP Unavailable         Unavailable

 

                    Danilo, A Radha FNP Unavailable         Unavailable

 

                    Danilo, A Radha FNP Unavailable         Unavailable

 

                    Danilo, A Radha FNP Unavailable         Unavailable

 

                    Danilo, A Radha FNP Unavailable         Unavailable

 

                    Danilo, A Radha FNP Unavailable         Unavailable

 

                    Danilo, A Radha FNP Unavailable         Unavailable

 

                    Danilo, A Radha FNP Unavailable         Unavailable

 

                    Danilo, A Radha FNP Unavailable         Unavailable

 

                    Danilo, A Radha FNP Unavailable         Unavailable

 

                    Danilo, A Radha FNP Unavailable         Unavailable

 

                    Danilo, A Radha FNP Unavailable         Unavailable

 

                    Danilo, A Radha FNP Unavailable         Unavailable

 

                    Danlio, A Radha FNP Unavailable         Unavailable

 

                    Danilo, A Radha FNP Unavailable         Unavailable

 

                    Danilo, A Radha FNP Unavailable         Unavailable

 

                    Danilo, A Rdaha FNP Unavailable         Unavailable

 

                    Danilo, A Radha FNP Unavailable         Unavailable

 

                    Danilo, A Radha FNP Unavailable         Unavailable

 

                    ANGELICA JAMES MD    Unavailable         Unavailable

 

                    ANGELICA JAMES MD    Unavailable         Unavailable

 

                    ANGELICA JAMES MD    Unavailable         Unavailable

 

                    ANGELICA JAMES MD    Unavailable         Unavailable

 

                    ANGELICA JAMES MD    Unavailable         Unavailable

 

                    ANGELICA JAMES MD    Unavailable         Unavailable

 

                    ANGELICA JAMES MD    Unavailable         Unavailable

 

                    Jose James MD Unavailable         Unavailable

 

                    Locke, E Sammie       Unavailable         Unavailable

 

                    Locke, E Sammie       Unavailable         Unavailable

 

                    Locke, E Sammie       Unavailable         Unavailable

 

                    Locke, E Sammie       Unavailable         Unavailable

 

                    Locke, E Sammie       Unavailable         Unavailable

 

                    Locke, E Sammie       Unavailable         Unavailable

 

                    Locke, E Sammie       Unavailable         Unavailable

 

                    Locke, E Sammie       Unavailable         Unavailable

 

                    Locke, E Sammie       Unavailable         Unavailable

 

                    Locke, E Sammie       Unavailable         Unavailable

 

                    Locke, E Sammie       Unavailable         Unavailable

 

                    Locke, E Sammie       Unavailable         Unavailable

 

                    Locke, E Sammie       Unavailable         Unavailable

 

                    Locke, E Sammie       Unavailable         Unavailable

 

                    Locke, E Sammie       Unavailable         Unavailable

 

                    Locke, E Sammie       Unavailable         Unavailable

 

                    Locke, E Sammie       Unavailable         Unavailable

 

                    TROY Rodriguez MD Unavailable         Unavailable

 

                    TROY Rodriguez MD Unavailable         Unavailable

 

                    TROY Rodriguez MD Unavailable         Unavailable

 

                    TROY Rodriguez MD Unavailable         Unavailable

 

                    TROY Rodriguez MD Unavailable         Unavailable

 

                    TROY Rodriguez MD Unavailable         Unavailable

 

                    TROY Rodriguez MD Unavailable         Unavailable

 

                    TROY Rodriguez MD Unavailable         Unavailable

 

                    TROY Rodriguez MD Unavailable         Unavailable

 

                    TROY Rodriguez MD Unavailable         Unavailable

 

                    TROY Rodriguez MD Unavailable         Unavailable

 

                    TROY Rodriguez MD Unavailable         Unavailable

 

                    TROY Rodriguez MD Unavailable         Unavailable

 

                    TROY Rodriguez MD Unavailable         Unavailable

 

                    TROY Rodriguez MD Unavailable         Unavailable

 

                    TROY Rodriguez MD Unavailable         Unavailable

 

                    TROY Rodriguez MD Unavailable         Unavailable

 

                    TROY Rodriguez MD Unavailable         Unavailable

 

                    TROY Rodriguez MD Unavailable         Unavailable

 

                    TROY Rodriguez MD Unavailable         Unavailable

 

                    TROY Rodriguez MD Unavailable         Unavailable

 

                    TROY Rodriguez MD Unavailable         Unavailable

 

                    TROY Rodriguez MD Unavailable         Unavailable

 

                    TROY Rodriguez MD Unavailable         Unavailable

 

                    TROY Rodriguez MD Unavailable         Unavailable

 

                    TROY Rodriguez MD Unavailable         Unavailable

 

                    TROY Rodriguez MD Unavailable         Unavailable

 

                    TROY Rodriguez MD Unavailable         Unavailable

 

                    TROY Rodriguez MD Unavailable         Unavailable

 

                    TROY Rodriguez MD Unavailable         Unavailable

 

                    TROY Rodriguez MD Unavailable         Unavailable

 

                    TROY Rodriguez MD Unavailable         Unavailable

 

                    TROY Rodriguez MD Unavailable         Unavailable

 

                    TROY Rodriguez MD Unavailable         Unavailable

 

                    TROY Rodriguez MD Unavailable         Unavailable

 

                    TROY Rodriguez MD Unavailable         Unavailable

 

                    TROY Rodriguez MD Unavailable         Unavailable

 

                    TROY Rodriguez MD Unavailable         Unavailable

 

                    TROY Rodriguez MD Unavailable         Unavailable

 

                    TROY Rodriguez MD Unavailable         Unavailable

 

                    TROY Rodriguez MD Unavailable         Unavailable

 

                    TROY Rodriguez MD Unavailable         Unavailable

 

                    TROY Rodriguez MD Unavailable         Unavailable

 

                    TROY Rodriguez MD Unavailable         Unavailable

 

                    TROY Rodriguez MD Unavailable         Unavailable

 

                    TROY Rodriguez MD Unavailable         Unavailable

 

                    TROY Rodriguez MD Unavailable         Unavailable

 

                    TROY Rodriguez MD Unavailable         Unavailable

 

                    TROY Rodriguez MD Unavailable         Unavailable

 

                    TROY Rodriguez MD Unavailable         Unavailable

 

                    TROY Rodriguez MD Unavailable         Unavailable

 

                    TROY Rodriguez MD Unavailable         Unavailable

 

                    TROY Rodriguez MD Unavailable         Unavailable

 

                    TROY Rodriguez MD Unavailable         Unavailable

 

                    TROY Rodriguez MD Unavailable         Unavailable

 

                    TROY Rodriguez MD Unavailable         Unavailable

 

                    TROY Rodriguez MD Unavailable         Unavailable

 

                    TROY Rodriguez MD Unavailable         Unavailable

 

                    TROY Rodriguez MD Unavailable         Unavailable

 

                    TROY Rodriguez MD Unavailable         Unavailable

 

                    TROY Rodriguez MD Unavailable         Unavailable

 

                    TROY Rodriguez MD Unavailable         Unavailable

 

                    TROY Rodriguez MD Unavailable         Unavailable

 

                    TROY Rodriguez MD Unavailable         Unavailable

 

                    TROY Rodriguez MD Unavailable         Unavailable

 

                    TROY Rodriguez MD Unavailable         Unavailable

 

                    Michael, O Samah MD Unavailable         Unavailable

 

                    Michael, O Samah MD Unavailable         Unavailable

 

                    Michael, O Samah MD Unavailable         Unavailable

 

                    Michael, O Samah MD Unavailable         Unavailable

 

                    Michael, O Samah MD Unavailable         Unavailable

 

                    Michael, O Samah MD Unavailable         Unavailable

 

                    Michael, O Samah MD Unavailable         Unavailable

 

                    Michael, O Samah MD Unavailable         Unavailable

 

                    Michael, O Samah MD Unavailable         Unavailable

 

                    Michael, O Samah MD Unavailable         Unavailable

 

                    Michael, O Samah MD Unavailable         Unavailable

 

                    Michael, O Samah MD Unavailable         Unavailable

 

                    Michael, O Samah MD Unavailable         Unavailable



                                  



Re-disclosure Warning

          The records that you are about to access may contain information from 
federally-assisted alcohol or drug abuse programs. If such information is 
present, then the following federally mandated warning applies: This information
has been disclosed to you from records protected by federal confidentiality 
rules (42 CFR part 2). The federal rules prohibit you from making any further 
disclosure of this information unless further disclosure is expressly permitted 
by the written consent of the person to whom it pertains or as otherwise 
permitted by 42 CFR part 2. A general authorization for the release of medical 
or other information is NOT sufficient for this purpose. The Federal rules 
restrict any use of the information to criminally investigate or prosecute any 
alcohol or drug abuse patient.The records that you are about to access may 
contain highly sensitive health information, the redisclosure of which is 
protected by Article 27-F of the [facility] Public Health law. If you 
continue you may have access to information: Regarding HIV / AIDS; Provided by 
facilities licensed or operated by the [facility] Office of Mental Health; 
or Provided by the [facility] Office for People With Developmental 
Disabilities. If such information is present, then the following [place] 
mandated warning applies: This information has been disclosed to you from 
confidential records which are protected by state law. State law prohibits you 
from making any further disclosure of this information without the specific 
written consent of the person to whom it pertains, or as otherwise permitted by 
law. Any unauthorized further disclosure in violation of state law may result in
a fine or snf sentence or both. A general authorization for the release of 
medical or other information is NOT sufficient authorization for further disc
losure.                                                                         
    



Allergies and Adverse Reactions

          



           Type       Description Substance  Reaction   Status     Data Source(s

)

 

           Drug allergy Drug allergy Penicillins                       [facility]

[facility]

 

           Allergy to substance Allergy to substance Penicillin antibiotic produ

ct                       

[facility] ([facility])



                                                                                
                 



Encounters

          



           Encounter  Providers  Location   Date       Indications Data Source(s

)

 

           Outpatient                       2021 12:00:00 AM EDT consult  

  [facility]

 

                                        consult 

 

                Unlisted evaluation and management service Performer: Sakshi sher                 2021

09:57:00 PM EDT - 2021 05:10:00 PM EDT                           [facility] 

([facility])

 

                Unlisted evaluation and management service Performer: Sakshi sher                 2021

08:36:00 PM EDT                                     [facility] ([facility])

 

                Outpatient      Attender: Scout Rodriguez MD Main office - [facility] 07/15/2021 

09:30:00 AM EDT                                     JUAN MIGUEL ([facility], )

 

                                        PIO VargasBC: [address], [place] [address], Ph. [phone]                  [facility]: [facility]       2021 12:00:00 AM EDT                     [place] ([facility])

 

                                        MIREYA Vargas: [address]

[address], [place] [address], Ph. [phone]                  [facility]: [facility]       2021 12:00:00 AM EDT                     [facility] ([facility])

 

                                        MIREYA Vargas: [address]

[address], [place] [address], Ph. [phone]                  Attender: Radha Carrasco [facility]       2021 12:00:00 AM EDT                     [place] ([facility])

 

                Unlisted evaluation and management service                      

           2021 04:30:00 PM EST - 

2021 10:53:00 PM EST                           [facility] ([facility])

 

           Outpatient Attender: Sammie Markham   2020 02:40:00 PM EST

            [facility] 

([facility])

 

                          Inpatient                 Attender: Angelica PENALOZAtte

nder: ANGELICA JAMES MDAdmitter: ANGELICA JAMES MD                  [facility]  2020 03:42:00 PM EST - 2021 

10:45:00 AM EST 

PSYCHOACTIVE SUBSTANCE DEPENDENCE       [facility]

 

                                        PSYCHOACTIVE SUBSTANCE DEPENDENCE 

 

                                        Patient discharged. 

 

                Unlisted evaluation and management service Performer: Sakshi sher                 2020

08:54:00 PM EST - 2020 07:20:00 PM EST                           [facility] 

([employer] [facility])



                                                                                
                                                                                
                          



Medications

          



          Medication Brand Name Start Date Product Form Dose      Route     Admi

nistrative 

Instructions Pharmacy Instructions Status     Indications Reaction   Description

 Data 

Source(s)

 

                          Buprenorphine 12 MG / Naloxone 3 MG Oral Strip Bupreno

rphine 

Hydrochloride/Naloxone Hydrochloride 2020 12:00:00 AM EST                 

          SUBLINGUAL    

                      active                                      [facility] ([facility])

 

                    Clonidine Hydrochloride 0.1 MG Oral Tablet Clonidine HCL    

   2020 12:00:00 AM 

EST                                       active                      [facility] ([facility])

 

                    Hydroxyzine Hydrochloride 25 MG Oral Tablet Hydroxyzine HCL 

    2020 12:00:00 

AM EST                                    active                      [facility] ([facility])

 

                                        Buprenorphine 2 MG / Naloxone 0.5 MG Ora

l Strip buprenorphine 2 mg-naloxone 0.5 

mg sublingual film PLACE ONE FILM UNDER THE TONGUE TWICE A DAY  MAXIMUM DAILY 
DOSE   2 FILMS                          buprenorphine 2 mg-naloxone 0.5 mg subli

ngual film PLACE ONE FILM

UNDER THE TONGUE TWICE A DAY  MAXIMUM DAILY DOSE   2 FILMS                      

                                       completed

                                                buprenorphine 2 MG / naloxone 0.

5 MG Sublingual Film [facility]NN[facility] ([facility])

 

                                        Nicotine 4 MG Chewing Gum nicotine (eladio

crilex) 4 mg gum CHEW 1 PIECE OF GUM 

EVERY HOUR AS NEEDED FOR NICOTINE CRAVINGS nicotine (polacrilex) 4 mg gum CHEW 1

PIECE OF GUM EVERY HOUR AS NEEDED FOR NICOTINE CRAVINGS                         

                        completed         

                          nicotine 4 MG Chewing Gum [facility]NNIE ([facility])

 

                                        Buprenorphine 2 MG / Naloxone 0.5 MG Ora

l Strip buprenorphine 2 mg-naloxone 0.5 

mg sublingual film PLACE ONE FILM UNDER THE TONGUE TWICE A DAY  MAXIMUM DAILY 
DOSE   2 FILMS                          buprenorphine 2 mg-naloxone 0.5 mg subli

ngual film PLACE ONE FILM

UNDER THE TONGUE TWICE A DAY  MAXIMUM DAILY DOSE   2 FILMS                      

                                       completed

                                                buprenorphine 2 MG / naloxone 0.

5 MG Sublingual Film [facility] ([facility])

 

                                        quetiapine 50 MG Oral Tablet quetiapine 

50 mg tablet TAKE ONE TABLET BY MOUTH AT

BEDTIME quetiapine 50 mg tablet TAKE ONE TABLET BY MOUTH AT BEDTIME             

                                    

completed                                       quetiapine 50 MG Oral Tablet ATH

[facility] ([facility])

 

                                        Ibuprofen 800 MG Oral Tablet ibuprofen 8

00 mg tablet TAKE ONE TABLET BY MOUTH 

EVERY 6 HOURS AS NEEDED FOR PAIN        ibuprofen 800 mg tablet TAKE ONE TABLET 

BY 

MOUTH EVERY 6 HOURS AS NEEDED FOR PAIN                                          

 completed               ibuprofen 800 

MG Oral Tablet                          [facility] ([facility])

 

                                        Levofloxacin 250 MG Oral Tablet levoflox

acin 250 mg tablet TAKE ONE TABLET BY 

MOUTH DAILY levofloxacin 250 mg tablet TAKE ONE TABLET BY MOUTH DAILY           

                                        

             completed                              levofloxacin 250 MG Oral Tab

let [facility]NNIE ([facility])

 

                                        Cephalexin 500 MG Oral Capsule cephalexi

n 500 mg capsule TAKE ONE CAPSULE BY 

MOUTH THREE TIMES A DAY FOR 10 DAYS     cephalexin 500 mg capsule TAKE ONE CAPSU

LE 

BY MOUTH THREE TIMES A DAY FOR 10 DAYS                                          

 completed               cephalexin 500 

MG Oral Capsule                         [facility] ([facility])

 

                                        Buprenorphine 4 MG / Naloxone 1 MG Oral 

Strip buprenorphine 4 mg-naloxone 1 mg 

sublingual film PLACE ONE FILM UNDER THE TONGUE TWICE A DAY  MAXIMUM DAILY DOSE 
 2 FILMS                                buprenorphine 4 mg-naloxone 1 mg subling

ual film PLACE ONE FILM UNDER 

THE TONGUE TWICE A DAY  MAXIMUM DAILY DOSE   2 FILMS                            

               completed               

buprenorphine 4 MG / naloxone 1 MG Sublingual Film [place] ([facility])

 

        Tab-A-Violeta 400 mcg tablet TAKE ONE TABLET BY MOUTH EVERY DAY [id number]     

                                             

completed                                       Tab-A-Violeta 400 mcg tablet [place]

 ([facility])

 

                                        Cephalexin 500 MG Oral Capsule cephalexi

n 500 mg capsule TAKE ONE CAPSULE BY 

MOUTH THREE TIMES A DAY FOR 10 DAYS     cephalexin 500 mg capsule TAKE ONE CAPSU

LE 

BY MOUTH THREE TIMES A DAY FOR 10 DAYS                                          

 completed               cephalexin 500 

MG Oral Capsule                         [facility] ([facility])

 

                                        Clotrimazole 10 MG/ML Topical Cream clot

rimazole 1 % topical cream APPLY 

TOPICALLY TO FEET TWO TIMES A DAY       clotrimazole 1 % topical cream APPLY TOP

ICALLY

TO FEET TWO TIMES A DAY                                           completed     

          clotrimazole 10 MG/ML Topical 

Cream                                   [facility] ([facility])

 

                                        Buprenorphine 4 MG / Naloxone 1 MG Oral 

Strip buprenorphine 4 mg-naloxone 1 mg 

sublingual film PLACE ONE FILM UNDER THE TONGUE TWICE A DAY  MAXIMUM DAILY DOSE 
 2 FILMS                                buprenorphine 4 mg-naloxone 1 mg subling

ual film PLACE ONE FILM UNDER 

THE TONGUE TWICE A DAY  MAXIMUM DAILY DOSE   2 FILMS                            

               completed               

buprenorphine 4 MG / naloxone 1 MG Sublingual Film [place] ([facility])

 

                                        Doxycycline Monohydrate 100 MG Oral Caps

ule doxycycline monohydrate 100 mg 

capsule TAKE ONE CAPSULE BY MOUTH EVERY 12 HOURS doxycycline monohydrate 100 mg 

capsule TAKE ONE CAPSULE BY MOUTH EVERY 12 HOURS                                

           completed               

doxycycline monohydrate 100 MG Oral Capsule [place] ([facility])

 

        Tab-A-Violeta 400 mcg tablet TAKE ONE TABLET BY MOUTH EVERY DAY [id number]     

                                             

completed                                       Tab-A-Violeta 400 mcg tablet [place]

 ([facility])

 

                                        buspirone hydrochloride 5 MG Oral Tablet

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                        









                    ID                  Date                Data Source

 

                    [id number] 2020 10:01:00 AM [facility]      Value     Range     Interpretation Code Description Data [place]

rce(s) Supporting 

Document(s)

 

           Sodium     139 mmol/L 137-145    Normal (applies to non-numeric resul

ts)            [facility]                         

 

           Potassium             3.5-5.1    Normal (applies to non-numeric resul

ts)            [facility]                                 

 

           Chloride   106 mmol/L      Normal (applies to non-numeric resul

ts)            [facility]                         

 

           Carbon Dioxide CO2            22.0-33.0  Normal (applies to non-numer

ic results)            [facility]                         

 

           Anion Gap             4.0-11.0   Normal (applies to non-numeric resul

ts)            [facility]                                 

 

           BUN        17 mg/dL   9-20       Normal (applies to non-numeric resul

ts)            [facility]                                 

 

           Creatinine            0.80-1.50  Normal (applies to non-numeric resul

ts)            [facility]                                 

 

           GFR        88 mL/min  >60        Normal (applies to non-numeric resul

ts)            [facility]                                 

 

                                        Result based on MDRD formula. 

 

          Glucose Level 110 mg/dL 74-99     Above high normal           [facility]  

 

                                        The reference range is only applicable w

hen fasting. 

 

           Calcium-Uncorrected            8.4-10.2   Normal (applies to non-nume

nikki results)            [facility]                         

 

           Corrected Calcium            8.4-10.2   Normal (applies to non-numeri

c results)            [facility]                         

 

           Bilirubin,Total            0.2-1.3    Normal (applies to non-numeric 

results)            [facility]                         

 

           Bilirubin,Direct            0.0-0.3    Normal (applies to non-numeric

 results)            [facility]                         

 

          SGOT(AST) 79 U/L    17-59     Above high normal           [facility]  

 

           SGPT(ALT)  51 U/L     21-72      Normal (applies to non-numeric resul

ts)            [facility]                                 

 

           Alkaline Phosphatase 71 U/L          Normal (applies to non-num

br[place]ette results)            

[facility]                  

 

                                        Pregnancy can increase Alkaline Phosp le

vels up to 2 times the normal adult 

value.      Normal values for children and adolescents are 2 to 3 times the 
normal adult value. 

 

          CPK       695 U/L       Above high normal           [facility]  

 

           Total Protein            6.3-8.2    Normal (applies to non-numeric re

sults)            [facility]                                

 

           Albumin               3.5-5.0    Normal (applies to non-numeric resul

ts)            [facility]                                 

 

                Thyroid Stimulate Hormone TSH                 0.358-3.740     No

rmal (applies to non-numeric 

results)                                [facility]  









                    ID                  Date                Data Source

 

                    [id number] 2020 10:01:00 AM EST [facility]









          Name      Value     Range     Interpretation Code Description Data [place]

rce(s) Supporting 

Document(s)

 

           Magnesium             1.80-2.40  Normal (applies to non-numeric resul

ts)            [facility]                                 









                    ID                  Date                Data Source

 

                    [id number] 2020 10:01:00 AM EST [facility]









          Name      Value     Range     Interpretation Code Description Data [place]

rce(s) Supporting 

Document(s)

 

           C-Reactive Protein,Wide Range            <3.00      Above high normal

            [facility]                                 









                    ID                  Date                Data Source

 

                    [id number] 2020 09:31:00 AM EST [facility]









          Name      Value     Range     Interpretation Code Description Data [place]

rce(s) Supporting 

Document(s)

 

           White Blood Count            4.8-10.8   Normal (applies to non-numeri

c results)            [facility]                         

 

           Red Blood Count            4.35-6.08  Normal (applies to non-numeric 

results)            [facility]                         

 

           Hemoglobin            13.0-17.5  Normal (applies to non-numeric resul

ts)            [facility]                                 

 

           Hematocrit            37.7-51.0  Normal (applies to non-numeric resul

ts)            [facility]                                 

 

           Mean Corpuscular Volume            80-94      Normal (applies to non-

numeric results)            [facility]                        

 

             Mean Corpuscular Hemoglobin              27.0-33.0    Normal (appli

es to non-numeric results) 

                          [facility]    

 

           Mean Corpuscular HGB Conc            32.0-36.0  Normal (applies to no

n-numeric results)            

[facility]                  

 

             Red Cell Distribution Width              11.5-14.5    Normal (appli

es to non-numeric results) 

                          [facility]    

 

           Platelet Count 207 X10 3/uL 130-450    Normal (applies to non-numeric

 results)            

[facility]                  

 

           Mean Platelet Volume            9.6-13.1   Normal (applies to non-num

brigette results)            [facility]                        

 

           Imm Grans% (AUTO) 0 %        0-2        Normal (applies to non-numeri

c results)            [facility]                         

 

           Neutrophils % (AUTO) 52 %       40-75      Normal (applies to non-num

brigette results)            [facility]                        

 

           Lymphocytes % (AUTO) 29 %       21-46      Normal (applies to non-num

brigette results)            [facility]                        

 

           Monocytes % (AUTO) 11 %       5-12       Normal (applies to non-numer

ic results)            [facility]                         

 

          Eosinophils % (AUTO) 7 %       1-5       Above high normal           [facility]

[facility]  

 

           Basophils % (AUTO) 1 %        0-1        Normal (applies to non-numer

ic results)            [facility]                         

 

           Imm Grans# (AUTO)            0.0-0.5    Normal (applies to non-numeri

c results)            [facility]                         

 

           Neutrophils # (AUTO)            1.5-8.1    Normal (applies to non-num

brigette results)            [facility]                         

 

           Lymphocytes # (AUTO)            1.0-3.1    Normal (applies to non-num

brigette results)            [facility]                         

 

           Monocytes # (AUTO)            0.2-1.3    Normal (applies to non-numer

ic results)            [facility]                         

 

           Eosinophils# (AUTO)            0.0-0.5    Normal (applies to non-nume

nikki results)            [facility]                         

 

           Basophils # (AUTO)            0.0-0.1    Normal (applies to non-numer

ic results)            [facility]                         









                    ID                  Date                Data Source

 

                    [id number] 2020 02:28:00 PM EST [facility]









          Name      Value     Range     Interpretation Code Description Data [place]

rce(s) Supporting 

Document(s)

 

             Cannabinoids Confirm,Ur result              .            Very abnor

mal (applies to non-numeric units  

                          [facility]    

 

                                        Carboxy THC GC/MS Conf      92          

       ng/mL Cutoff=10        01  

Performed at:  [address]  [id number]  
: Araceli Reyes MD, Phone:  [phone] 









                    ID                  Date                Data Source

 

                    [id number] 2020 09:06:00 PM EST [facility]









          Name      Value     Range     Interpretation Code Description Data [place]

rce(s) Supporting 

Document(s)

 

           Opiate Screen,Urine            Negative   Normal (applies to non-nume

nikki results)            [facility]                         

 

          Amphetamine Screen,Urine           Negative  [place]                  [facility]  

 

                Benzodiazepines Scrn,Ur result                 Negative        N

ormal (applies to non-numeric 

results)                                [facility]  

 

           Cocaine Screen,Urine            Negative   Normal (applies to non-num

brigette results)            [facility]                        

 

           Methadone Screen,Urine            Negative   Normal (applies to non-n

umeric results)            

[facility]                  

 

          Cannabinoid Screen, Ur           Negative  Ha                  [facility]  

 

                                        Therapeutic Drug Ranges for Emergency an

d Rehabilitation                        

      Threshold Levels (ng/mL)  Cocaine                           300  Opiates  
                         300  Cannabinoids                       50  
Barbiturates                      200  Benzodiazepine                    200  
Methadone                         300  Amphetamines                     1000    
All positive findings are presumptive and unconfirmed.    Confirmation of 
positive results are performed only at request of provider.  Unconfirmed results
 must not be used for non-medical purposes (i.e. pre-employment and legal 
purposes) 









                    ID                  Date                Data Source

 

                    [id number]   2020 06:32:00 PM EST [facility]









          Name      Value     Range     Interpretation Code Description Data [place]

rce(s) Supporting 

Document(s)

 

                                        Respiratory specimen severe acute respir

atory syndrome coronavirus 2 

(SARS-CoV-2) RNA                                             [facility]      

 

                                        This lab was ordered by [facility]

oj and reported by [facility]. 









                    ID                  Date                Data Source

 

                    [id number] 2020 06:18:00 PM EST [facility]









          Name      Value     Range     Interpretation Code Description Data [place]

rce(s) Supporting 

Document(s)

 

           SARS-CoV-2 RNA            Negative   Normal (applies to non-numeric r

esults)            [facility]                         

 

                                        Negative results should be treated as pr

esumptive and, if inconsistent with 

clinical signs and symptoms or necessary for patient management, should be 
tested with different authorized or cleared molecular tests. Negative results do
 not preclude SARS-CoV-2 infection and should not be used as the sole basis for 
patient management decisions. Negative results should be considered in the 
context of a patients recent exposures, history and the presence of clinical 
signs and symptoms consistent with COVID-19.     This test has not been FDA 
cleared or approved; this test has been authorized by FDA under an Emergency Use
 Authorization for use by laboratories certified under the Clinical Laboratory 
Improvement Amendments of 1988 (CLIA), 42 U.S.C. 263a, to perform moderate 
complexity/high complexity tests and at the Point of Care (POC), i.e., in 
patient care settings operating under a CLIA Certificate of Waiver, Certificate 
of Compliance, or Certificate of Accreditation.    Factsheets for healthcare 
providers:  https://www.fda.gov/media/174758/download  Factsheets for patients: 
 https://www.fda.gov/media/136968/download    The ID NOW Instrument is a rapid 
molecular in vitro diagnostic test utilizing an isothermal nucleic acid 
amplification technology intended for the qualitative detection of nucleic acid 
from the SARS-CoV-2 viral RNA.    THIS IS A STATE REPORTABLE COMMUNICABLE 
DISEASE.    Manual entry verified  by Cherie Hood 20 1817 









                    ID                  Date                Data Source

 

                    [id number] 2020 11:55:00 PM EST [facility]









          Name      Value     Range     Interpretation Code Description Data [place]

rce(s) Supporting 

Document(s)

 

           Color,Urine            Yellow     Normal (applies to non-numeric resu

lts)            [facility]                                 

 

           Clarity,Urine            Clear      Normal (applies to non-numeric re

sults)            [facility]                                 

 

           Specific Gravity,Urine            1.001-1.030 Normal (applies to non-

numeric results)            

[facility]                  

 

           PH,Urine              5.0-8.0    Normal (applies to non-numeric resul

ts)            [facility]                                 

 

           Protein,Urine            Negative   Normal (applies to non-numeric re

sults)            [facility]                         

 

           Glucose,Urine (UA)            Negative   Normal (applies to non-numer

ic results)            [facility]                         

 

          Ketones,Urine           Negative  [facility]

ospital  

 

           Blood,Urine            Negative   Normal (applies to non-numeric resu

lts)            [facility]                                 

 

           Bilirubin,Urine            Negative   Normal (applies to non-numeric 

results)            [facility]                         

 

           Urobilinogen,Urine            Norm 0.2-1 Normal (applies to non-numer

ic results)            [facility]                        

 

           Leukocyte Esterase,Urine            Negative   Normal (applies to non

-numeric results)            

[facility]                  

 

           Nitrite,Urine            Negative   Normal (applies to non-numeric re

sults)            [facility]                         







                                        Procedure

 

                                          



                                                                                
                                                                                
                                                                                
                                                                    



Social History

          No Information                                                        
                                



Vital Signs

          



                    ID                  Date                Data Source

 

                    UNK                                      









           Name       Value      Range      Interpretation Code Description Data

 Source(s)

 

           Systolic blood pressure 104.0 MM[HG]                       104.0 MM[H

G] NETSMART ([employer])

 

           Diastolic blood pressure 61.0 MM[HG]                       61.0 MM[HG

] NETSMART ([employer])

 

           Heart rate 94.0 /MIN                        94.0 /MIN  NETSMART ([employer])

 

           Respiratory rate 16.0 /MIN                        16.0 /MIN  NETSMART

 ([facility])

 

           Body temperature 97.7 [DEGF]                       97.7 [DEGF] NETSMA

RT ([facility])

 

           Systolic blood pressure 117.0 MM[HG]                       117.0 MM[H

G] NETSMART ([facility])

 

           Body temperature 36.5 LACEY                         36.5 LACEY   NETSMART

 ([facility])

 

           Diastolic blood pressure 62.0 MM[HG]                       62.0 MM[HG

] NETSMART ([facility])

 

           Heart rate 64.0 /MIN                        64.0 /MIN  [facility] ([facility])

 

           Body temperature 97.8 [DEGF]                       97.8 [DEGF] NETSMA

RT ([facility])

 

           Body temperature 36.6 LACEY                         36.6 LACEY   NETSMART

 ([facility])

 

           Heart rate 90.0 /MIN                        90.0 /MIN  [facility] ([facility])

 

           Respiratory rate 16.0 /MIN                        16.0 /MIN  NETSMART

 ([facility])

 

           Systolic blood pressure 110.0 MM[HG]                       110.0 MM[H

G] NETSMART ([facility])

 

           Diastolic blood pressure 66.0 MM[HG]                       66.0 MM[HG

] NETSMART ([facility])

 

           Body temperature 36.3 LACEY                         36.3 LACEY   NETSMART

 ([facility])

 

           Body temperature 97.3 [DEGF]                       97.3 [DEGF] NETSMA

RT ([facility])

 

           Systolic blood pressure 118.0 MM[HG]                       118.0 MM[H

G] NETSMART ([facility])

 

           Heart rate 64.0 /MIN                        64.0 /MIN  [facility]SMART ([facility])

 

           Respiratory rate 16.0 /MIN                        16.0 /MIN  NETSMART

 ([facility])

 

           Diastolic blood pressure 69.0 MM[HG]                       69.0 MM[HG

] NETSMART ([facility])

 

           Heart rate 86.0 /MIN                        86.0 /MIN  [facility] ([facility])

 

           Systolic blood pressure 110.0 MM[HG]                       110.0 MM[H

G] NETSMART ([facility])

 

           Diastolic blood pressure 69.0 MM[HG]                       69.0 MM[HG

] NETSMART ([facility])

 

           Respiratory rate 17.0 /MIN                        17.0 /MIN  NETSMART

 ([facility])

 

           Respiratory rate 16.0 /MIN                        16.0 /MIN  NETSMART

 ([facility])

 

           Systolic blood pressure 120.0 MM[HG]                       120.0 MM[H

G] NETSMART ([facility])

 

           Diastolic blood pressure 64.0 MM[HG]                       64.0 MM[HG

] NETSMART ([facility])

 

           Heart rate 71.0 /MIN                        71.0 /MIN  [facility] ([facility])

 

           Body temperature 97.5 [DEGF]                       97.5 [DEGF] NETSMA

RT ([facility])

 

           Body temperature 36.4 LACEY                         36.4 LACEY   NETSMART

 ([facility])

 

           Body temperature 98.7 [DEGF]                       98.7 [DEGF] NETSMA

RT ([facility])

 

           Diastolic blood pressure 71.0 MM[HG]                       71.0 MM[HG

] NETSMART ([facility])

 

           Heart rate 74.0 /MIN                        74.0 /MIN  [facility]SM[facility] ([facility])

 

           Body temperature 37.1 LACEY                         37.1 LACEY   NETSMART

 ([facility])

 

           Body height 182.9 cm                         182.9 cm   NETSMART ([facility])

 

           Body mass index (BMI) [Ratio] 19.0                             19.0  

     [facility] ([facility])

 

           Body weight 63.6 KG                          63.6 KG    [facility] ([facility])

 

           Systolic blood pressure 131.0 MM[HG]                       131.0 MM[H

G] [facility] ([facility])

 

           Respiratory rate 16.0 /MIN                        16.0 /MIN  [facility]

 ([facility])

 

           Oxygen saturation in Arterial blood by Pulse oximetry 100.0 %        

                  100.0 %    

[facility] ([facility])

 

           Respiratory rate 12 /min                          12 /min    [facility] (

[facility], )

 

           Body height 72 [in_i]                        72 [in_i]  [facility] ([facility], )

 

                                        6'0" 

 

           Body weight 145.00 [lb_av]                       145.00 [lb_av] MEDEN

T ([facility], 

)

 

           Body mass index (BMI) [Ratio] 19.7 kg/m2                       19.7 k

g/m2 [facility] ([facility] 

[facility], )

 

           Ideal body weight 178 [lb_av]                       178 [lb_av] MEDEN

T ([facility], 

)

 

           Body height 72 [in_i]                        72 [in_i]  JOHNINE ([facility])

 

           Diastolic blood pressure 62 mm[Hg]                        62 mm[Hg]  

JOHNNIE ([facility])

 

           Diastolic blood pressure 62 mm[Hg]                        62 mm[Hg]  

JOHNNIE ([facility])

 

           Body height 72 [in_i]                        72 [in_i]  JOHNNIE ([facility])

 

           Body mass index (BMI) [Ratio] 19.4 kg/m2                       19.4 k

g/m2 JOHNNIE ([facility])

 

           Systolic blood pressure 98 mm[Hg]                        98 mm[Hg]  A

[facility] ([facility])

 

           Body weight 2290 [oz_av]                       2290 [oz_av] JOHNNIE ([facility])

 

           Body height 72 [in_i]                        72 [in_i]  JOHNNIE ([facility])

 

           Body mass index (BMI) [Ratio] 19.4 kg/m2                       19.4 k

g/m2 JOHNNIE ([facility])

 

           Systolic blood pressure 98 mm[Hg]                        98 mm[facility]Hg]  [facility]

 

           Body weight 2290 [oz_av]                       2290 [oz_av] JOHNNIE ([facility])

 

           Body temperature 36.4 LACEY                         36.4 LACEY   [facility]

 ([facility])

 

           Heart rate 72.0 /MIN                        72.0 /MIN  [facility] ([facility])

 

           Respiratory rate 14.0 /MIN                        14.0 /MIN  [facility]

 ([facility])

 

           Systolic blood pressure 122.0 MM[HG]                       122.0 MM[H

G] [facility] ([facility])

 

           Diastolic blood pressure 68.0 MM[HG]                       68.0 MM[HG

] [facility] ([facility])

 

           Body temperature 97.6 [DEGF]                       97.6 [DEGF] NETSMA

RT ([facility])

 

           Respiratory rate 17.0 /MIN                        17.0 /MIN  [facility]

 ([facility])

 

           Body temperature 36.6 LACEY                         36.6 LACEY   [facility]

 ([facility])

 

           Heart rate 92.0 /MIN                        92.0 /MIN  [facility] ([facility]

 

           Body temperature 97.8 [DEGF]                       97.8 [DEGF] NETSMA

RT ([facility])

 

           Systolic blood pressure 114.0 MM[HG]                       114.0 MM[H

G] [facility] ([facility])

 

           Diastolic blood pressure 74.0 MM[HG]                       74.0 MM[HG

] [facility] ([facility])

 

           Body temperature 98.2 [DEGF]                       98.2 [DEGF] NETSMA

RT ([facility])

 

           Body temperature 36.8 LACEY                         36.8 LACEY   [facility]SMART

 ([facility])

 

           Respiratory rate 16.0 /MIN                        16.0 /MIN  [facility]

 ([facility])

 

           Systolic blood pressure 119.0 MM[HG]                       119.0 MM[H

G] [facility] ([facility])

 

           Diastolic blood pressure 68.0 MM[HG]                       68.0 MM[HG

] [facility] ([facility])

 

           Heart rate 79.0 /MIN                        79.0 /MIN  [facility] ([facility]

 

           Body temperature 97.5 [DEGF]                       97.5 [DEGF] NETSMA

RT ([facility])

 

           Body temperature 36.4 LACEY                         36.4 LACEY   [facility]

 ([facility])

 

           Heart rate 64.0 /MIN                        64.0 /MIN  [facility]

 

           Respiratory rate 16.0 /MIN                        16.0 /MIN  [facility]

 ([facility])

 

           Systolic blood pressure 107.0 MM[HG]                       107.0 MM[H

G] [facility] ([facility])

 

           Diastolic blood pressure 60.0 MM[HG]                       60.0 MM[HG

] [facility] ([facility])

 

           Systolic blood pressure 113.0 MM[HG]                       113.0 MM[H

G] [facility] ([facility])

 

           Diastolic blood pressure 63.0 MM[HG]                       63.0 MM[HG

] [facility] ([facility])

 

           Respiratory rate 16.0 /MIN                        16.0 /MIN  [facility]

 ([facility])

 

           Heart rate 53.0 /MIN                        53.0 /MIN  [facility]

 

           Body temperature 37.1 LACEY                         37.1 LACEY   [facility]

 ([facility])

 

           Body temperature 98.7 [DEGF]                       98.7 [DEGF] NETSMA

RT ([facility])

 

           Heart rate 74.0 /MIN                        74.0 /MIN  [facility] ([facility]

 

           Systolic blood pressure 103.0 MM[HG]                       103.0 MM[H

G] [facility] ([facility])

 

           Diastolic blood pressure 62.0 MM[HG]                       62.0 MM[HG

] [facility] ([facility])

 

           Respiratory rate 17.0 /MIN                        17.0 /MIN  [facility]

 ([facility])

 

           Systolic blood pressure 103.0 MM[HG]                       103.0 MM[H

G] [facility] ([facility])

 

           Body temperature 98.7 [DEGF]                       98.7 [DEGF] NETSMA

RT ([facility])

 

           Heart rate 74.0 /MIN                        74.0 /MIN  [facility] ([facility]

 

           Respiratory rate 17.0 /MIN                        17.0 /MIN  [facility]

 ([facility])

 

           Diastolic blood pressure 62.0 MM[HG]                       62.0 MM[HG

] [facility] ([facility])

 

           Body temperature 37.1 LACEY                         37.1 LACEY   [facility]

 ([facility])

 

           Heart rate 73.0 /MIN                        73.0 /MIN  [facility] ([facility]

 

           Respiratory rate 16.0 /MIN                        16.0 /MIN  [facility]

 ([facility])

 

           Systolic blood pressure 107.0 MM[HG]                       107.0 MM[H

G] [facility] ([facility])

 

           Body temperature 97.7 [DEGF]                       97.7 [DEGF] NETSMA

RT ([facility])

 

           Diastolic blood pressure 61.0 MM[HG]                       61.0 MM[HG

] [facility] ([facility])

 

           Body temperature 36.5 LACEY                         36.5 LACEY   [facility]

 ([facility])

 

           Body temperature 98.7 [DEGF]                       98.7 [DEGF] NETSMA

RT ([facility])

 

           Heart rate 16.0 /MIN                        16.0 /MIN  [facility] ([facility]

 

           Respiratory rate 16.0 /MIN                        16.0 /MIN  [facility]

 ([facility])

 

           Systolic blood pressure 136.0 MM[HG]                       136.0 MM[H

G] [facility] ([facility])

 

           Diastolic blood pressure 76.0 MM[HG]                       76.0 MM[HG

] [facility] ([facility])

 

           Body temperature 37.1 LACEY                         37.1 LACEY   [facility]

 ([facility])

 

           Respiratory rate 16.0 /MIN                        16.0 /MIN  [facility]

 ([facility])

 

           Systolic blood pressure 112.0 MM[HG]                       112.0 MM[H

G] [facility] ([facility])

 

           Body temperature 97.8 [DEGF]                       97.8 [DEGF] NETSMA

RT ([facility])

 

           Body temperature 36.6 LACEY                         36.6 LACEY   [facility]

 ([facility])

 

           Heart rate 82.0 /MIN                        82.0 /MIN  [facility] ([facility]

 

           Diastolic blood pressure 70.0 MM[HG]                       70.0 MM[HG

] [facility] ([facility])

 

           Body temperature 36.8 LACEY                         36.8 LACEY   [facility]

 ([facility])

 

           Heart rate 87.0 /MIN                        87.0 /MIN  [facility] ([facility]

 

           Respiratory rate 18.0 /MIN                        18.0 /MIN  [facility]

 ([facility])

 

           Systolic blood pressure 115.0 MM[HG]                       115.0 MM[H

G] [facility] ([facility])

 

           Diastolic blood pressure 75.0 MM[HG]                       75.0 MM[HG

] [facility] ([facility])

 

           Body temperature 98.2 [DEGF]                       98.2 [DEGF] NETSMA

RT ([facility])

 

           Body temperature 36.6 LACEY                         36.6 LACEY   [facility]

 ([facility])

 

           Heart rate 91.0 /MIN                        91.0 /MIN  [facility] ([facility]

 

           Body temperature 97.8 [DEGF]                       97.8 [DEGF] NETSMA

RT ([facility])

 

           Respiratory rate 16.0 /MIN                        16.0 /MIN  [facility]

 ([facility])

 

           Systolic blood pressure 107.0 MM[HG]                       107.0 MM[H

G] [facility] ([facility])

 

           Diastolic blood pressure 62.0 MM[HG]                       62.0 MM[HG

] [facility] ([facility])

 

           Oxygen saturation in Arterial blood by Pulse oximetry 99.0 %         

                  99.0 %     [facility]SM[facility]

 ([facility])









                    ID                  Date                Data Source

 

                    [id number]           2021 09:35:00 AM EST [facility]









           Name       Value      Range      Interpretation Code Description Data

 Source(s)

 

           Weight Measurement Method 1                                1         

 [facility]

 

           Weight (Calculated Kilograms) 59.42                            59.42 

     [facility]

 

           Weight     4397                             6       [facility]

 

           Temperature Source 7                                7          [facility]

 

           Temperature 97.6                             97.6       [facility]

 

           Respiratory Effort 1                                1          [facility]

 

           Respiratory Rate 16                               16         [facility]

 

           Pulse Assessment Method 4                                4          [facility]

 

           Pulse Rate 66                               66         [facility]

 

           Height (Calculated Centimeters) 180.34                           180.

34     [facility]

 

           Height     71                               71         [facility]

 

           Blood Pressure 130/55                           130/55     [facility]

 

           Body Mass Index (BMI) 18.2                             18.2       [facility]

 

           Weight Measurement Method 1                                1         

 [facility]

 

           Weight (Calculated Kilograms) 59.42                            59.42 

     [facility]

 

           Weight     2096       [facility]

 

           Temperature Source 7                                7          [facility]

 

           Temperature 97.6                             97.6       [facility]

 

           Respiratory Effort 1                                1          [facility]

 

           Respiratory Rate 16                               16         [facility]

 

           Pulse Assessment Method 4                                4          [facility]

 

           Pulse Rate 66                               66         [facility]

 

           Height (Calculated Centimeters) 180.34                           180.

34     [facility]

 

           Height     71                               71         [facility]

 

           Blood Pressure 130/55                           130/55     [facility]

 

           Body Mass Index (BMI) 18.2                             18.2       [facility]

 

           Weight Measurement Method 1                                1         

 [facility]

 

           Weight (Calculated Kilograms) 59.42                            59.42 

     [facility]

 

           Weight     2096       [facility]

 

           Temperature Source 7                                7          [facility]

 

           Temperature 97.6                             97.6       [facility]

 

           Respiratory Effort 1                                1          [facility]

 

           Respiratory Rate 16                               16         [facility]

 

           Pulse Assessment Method 4                                4          [facility]

 

           Pulse Rate 66                               66         [facility]

 

           Height (Calculated Centimeters) 180.34                           180.

34     [facility]

 

           Height     71                               71         [facility]

 

           Blood Pressure 130/55                           130/55     [facility]

 

           Body Mass Index (BMI) 18.2                             18.2       [facility]

 

           Weight Measurement Method 1                                1         

 [facility]

 

           Weight (Calculated Kilograms) 59.42                            59.42 

     [facility]

 

           Weight     2096       [facility]

 

           Temperature Source 7                                7          [facility]

 

           Temperature 96.7                             96.7       [facility]

 

           Respiratory Effort 1                                1          [facility]

 

           Respiratory Rate 16                               16         [facility]

 

           Pulse Assessment Method 4                                4          [facility]

 

           Pulse Rate 49                               49         [facility]

 

           Height (Calculated Centimeters) 180.34                           180.

34     [facility]

 

           Height     71                               71         [facility]

 

           Blood Pressure 96/56                            96/56      [facility]

 

           Body Mass Index (BMI) 18.2                             18.2       [facility]

 

           Weight Measurement Method 1                                1         

 [facility]

 

           Weight (Calculated Kilograms) 59.42                            59.42 

     [facility]

 

           Weight     2096       [facility]

 

           Temperature Source 7                                7          [facility]

 

           Temperature 96.7                             96.7       [facility]

 

           Respiratory Effort 1                                1          [facility]

 

           Respiratory Rate 15                               15         [facility]

 

           Pulse Assessment Method 3                                3          C

[facility]

 

           Pulse Rate 72                               72         [facility]

 

           Height (Calculated Centimeters) 180.34                           180.

34     [facility]

 

           Height     71                               71         [facility]

 

           Blood Pressure 110/68                           110/68     [facility]

 

           Body Mass Index (BMI) 18.2                             18.2       [facility]

 

           Weight Measurement Method 1                                1         

 [facility]

 

           Weight (Calculated Kilograms) 59.42                            59.42 

     [facility]

 

           Weight     2096       [facility]

 

           Temperature Source 7                                7          [facility]

 

           Temperature 98.0                             98.0       [facility]

 

           Respiratory Effort 1                                1          [facility]

 

           Respiratory Rate 16                               16         [facility]

 

           Pulse Assessment Method 4                                4          C

[facility]

 

           Pulse Rate 76                               76         [facility]

 

           Height (Calculated Centimeters) 180.34                           180.

34     [facility]

 

           Height     71                               71         [facility]

 

           Blood Pressure 128/60                           128/60     [facility]

 

           Body Mass Index (BMI) 18.2                             18.2       [facility]



                                                                                
                            



Patient Treatment Plan of Care

          



             Planned Activity Planned Date Details      Description  Data Source

(s)

 

             Tab-A-Violeta 400 mcg tablet TAKE ONE TABLET BY MOUTH EVERY DAY       

                                 JOHNNIE ([facility])

 

             Sulfamethoxazole 800 MG / Trimethoprim 160 MG Oral Tablet          

                              JOHNNIE ([facility])

 

             quetiapine 50 MG Oral Tablet                                       

 JOHNNIE ([facility])

 

             Nicotine 4 MG Chewing Gum                                        AT

[facility])

 

                                        Narcan 4 mg/actuation nasal spray SPRAY 

2 SPRAYS  4MG  IN EACH NOSTRIL AS 

DIRECTED FOR SUSPECTED OPOID OVERDOSE AND CALL 911                              

                   JOHNNIE ([facility])

 

             methylprednisolone 4 mg tablets in a dose pack USE AS DIRECTED     

                                   [place] 

([facility])

 

             Levofloxacin 250 MG Oral Tablet                                    

    JOHNNIE ([facility])

 

             Ibuprofen 800 MG Oral Tablet                                       

 JOHNNIE ([facility])

 

             Hydroxyzine Hydrochloride 25 MG Oral Tablet                        

                JOHNNIE ([facility])

 

             Doxycycline Monohydrate 100 MG Oral Capsule                        

                JOHNNIE ([facility])

 

             Clotrimazole 10 MG/ML Topical Cream                                

        JOHNNIE ([facility])

 

             Cholecalciferol 1000 UNT Oral Tablet                               

         JOHNNIE ([facility])

 

             Cephalexin 500 MG Oral Capsule                                     

   JOHNNIE ([facility])

 

             buspirone hydrochloride 5 MG Oral Tablet                           

             JOHNNIE ([facility])

 

             Buprenorphine 8 MG / Naloxone 2 MG Oral Strip                      

                  JOHNNIE ([facility])

 

             Buprenorphine 4 MG / Naloxone 1 MG Oral Strip                      

                  JOHNNIE ([facility])

 

             Buprenorphine 2 MG / Naloxone 0.5 MG Oral Strip                    

                    JOHNNIE ([facility])

 

             Tab-A-Violeta 400 mcg tablet TAKE ONE TABLET BY MOUTH EVERY DAY       

                                 JOHNNIE ([facility])

 

             quetiapine 50 MG Oral Tablet                                       

 [place] ([facility])

 

             Nicotine 4 MG Chewing Gum                                        AT

[facility])

 

                                        Narcan 4 mg/actuation nasal spray SPRAY 

2 SPRAYS  4MG  IN EACH NOSTRIL AS 

DIRECTED FOR SUSPECTED OPOID OVERDOSE AND CALL 911                              

                   JOHNNIE ([facility])

 

             methylprednisolone 4 mg tablets in a dose pack USE AS DIRECTED     

                                   JOHNNIE 

([facility])

 

             Ibuprofen 800 MG Oral Tablet                                       

 JOHNNIE ([facility])

 

             Hydroxyzine Hydrochloride 25 MG Oral Tablet                        

                JOHNNIE ([facility])

 

             Doxycycline Monohydrate 100 MG Oral Capsule                        

                JOHNNIE ([facility])

 

             Clotrimazole 10 MG/ML Topical Cream                                

        JOHNNIE ([facility])

 

             Cholecalciferol 1000 UNT Oral Tablet                               

         JOHNNIE ([facility])

 

             Cephalexin 500 MG Oral Capsule                                     

   JOHNNIE ([facility])

 

             buspirone hydrochloride 5 MG Oral Tablet                           

             JOHNNIE ([facility])

 

             Buprenorphine 8 MG / Naloxone 2 MG Oral Strip                      

                  JOHNNIE ([facility])

 

             Buprenorphine 4 MG / Naloxone 1 MG Oral Strip                      

                  JOHNNIE ([facility])

 

             Buprenorphine 2 MG / Naloxone 0.5 MG Oral Strip                    

                    JOHNNIE ([facility])

## 2021-11-07 NOTE — CCD
Summarization Of Episode

                             Created on: 2021



JUWAN VILLELA

External Reference #: [id number]

: 1995

Sex: Undifferentiated



Demographics





                          Address                   [address] [place]  [address]

 

                          Home Phone                [phone]

 

                          Preferred Language        English

 

                          Marital Status            Unknown

 

                          Jewish Affiliation     Unknown

 

                          Race                      Unknown

 

                          Ethnic Group              YES





Author





                          Author                    [facility]

 

                          Organization              [facility]

 

                          Address                   Unknown

 

                          Phone                     Unavailable







Support





                Name            Relationship    Address         Phone

 

                UE              Next Of Kin     Unknown         Unavailable

 

                    CN CONSTRUCTION    Next Of Kin         UNKNOWN

[place], [place]  [id number] [phone]

 

                    ARTURO HARO    Next Of Kin         -

                          -, -  -                   [phone]

 

                Brenda Haro   Next Of Kin     Unknown         Unavailable

 

                    NURSE, DUTY ON      Next Of Kin         [address] [facility]

[place]  [address]                   Unavailable

 

                    Danilo FNP,  Radha Next Of Kin         [address] [place]  [address]                    [phone]

 

                    ARTURO HARO    ECON                -

                          -, -  -                   [phone]







Care Team Providers





                    Care Team Member Name Role                Phone

 

                    KANDI Ray   Unavailable         Unavailable

 

                    Danilo, A Radha FNP Unavailable         Unavailable

 

                    Danilo, A Radha FNP Unavailable         Unavailable

 

                    Danilo, A Radha FNP Unavailable         Unavailable

 

                    Danilo, A Radha FNP Unavailable         Unavailable

 

                    Danilo, A Radha FNP Unavailable         Unavailable

 

                    Danilo, A Radha FNP Unavailable         Unavailable

 

                    Danilo, A Radha FNP Unavailable         Unavailable

 

                    Danilo, A Radha FNP Unavailable         Unavailable

 

                    Danilo, A Radha FNP Unavailable         Unavailable

 

                    Danilo, A Radha FNP Unavailable         Unavailable

 

                    Danilo, A Radha FNP Unavailable         Unavailable

 

                    Danilo, A Radha FNP Unavailable         Unavailable

 

                    Danilo, A Radha FNP Unavailable         Unavailable

 

                    Danilo, A Radha FNP Unavailable         Unavailable

 

                    Danilo, A Ardha FNP Unavailable         Unavailable

 

                    Danilo, A Radha FNP Unavailable         Unavailable

 

                    Danilo, A Radha FNP Unavailable         Unavailable

 

                    Danilo, A Radha FNP Unavailable         Unavailable

 

                    Danilo, A Radha FNP Unavailable         Unavailable

 

                    Danilo, A Radha FNP Unavailable         Unavailable

 

                    Danilo, A Radha FNP Unavailable         Unavailable

 

                    Danilo, A Radha FNP Unavailable         Unavailable

 

                    Danilo, A Radha FNP Unavailable         Unavailable

 

                    Danilo, A Radha FNP Unavailable         Unavailable

 

                    Danilo, A Radha FNP Unavailable         Unavailable

 

                    Danilo, A Radha FNP Unavailable         Unavailable

 

                    Danilo, A Radha FNP Unavailable         Unavailable

 

                    Danilo, A Radha FNP Unavailable         Unavailable

 

                    Danilo, A Radha FNP Unavailable         Unavailable

 

                    Danilo, A Radha FNP Unavailable         Unavailable

 

                    Danilo, A Radha FNP Unavailable         Unavailable

 

                    ANGELICA JAMES MD    Unavailable         Unavailable

 

                    ANGELICA JAMES MD    Unavailable         Unavailable

 

                    ANGELICA JAMES MD    Unavailable         Unavailable

 

                    ANGELICA JAMES MD    Unavailable         Unavailable

 

                    ANEGLICA JAMES MD    Unavailable         Unavailable

 

                    ANGELICA JAEMS MD    Unavailable         Unavailable

 

                    ANGELICA JAMES MD    Unavailable         Unavailable

 

                    Jose James MD Unavailable         Unavailable

 

                    Locke, E Sammie       Unavailable         Unavailable

 

                    Locke, E Sammie       Unavailable         Unavailable

 

                    Locke, E Sammie       Unavailable         Unavailable

 

                    Locke, E Sammie       Unavailable         Unavailable

 

                    Locke, E Sammie       Unavailable         Unavailable

 

                    Locke, E Sammie       Unavailable         Unavailable

 

                    Locke, E Sammie       Unavailable         Unavailable

 

                    Locke, E Sammie       Unavailable         Unavailable

 

                    Locke, E Sammie       Unavailable         Unavailable

 

                    Locke, E Sammie       Unavailable         Unavailable

 

                    Locke, E Sammie       Unavailable         Unavailable

 

                    Locke, E Sammie       Unavailable         Unavailable

 

                    Locke, E Sammie       Unavailable         Unavailable

 

                    Locke, E Sammie       Unavailable         Unavailable

 

                    Locke, E Sammie       Unavailable         Unavailable

 

                    Locke, E Sammie       Unavailable         Unavailable

 

                    Locke, E Sammie       Unavailable         Unavailable

 

                    TROY Rodriguez MD Unavailable         Unavailable

 

                    TROY Rodriguez MD Unavailable         Unavailable

 

                    TROY Rodriguez MD Unavailable         Unavailable

 

                    TROY Rodriguez MD Unavailable         Unavailable

 

                    TROY Rodriguez MD Unavailable         Unavailable

 

                    TROY Rodriguez MD Unavailable         Unavailable

 

                    TROY Rodriguez MD Unavailable         Unavailable

 

                    TROY Rodriguez MD Unavailable         Unavailable

 

                    TROY Rodriguez MD Unavailable         Unavailable

 

                    TROY Rodriguez MD Unavailable         Unavailable

 

                    TROY Rodriguez MD Unavailable         Unavailable

 

                    TROY Rodriguez MD Unavailable         Unavailable

 

                    TROY Rodriguez MD Unavailable         Unavailable

 

                    TROY Rodriguez MD Unavailable         Unavailable

 

                    TROY Rodriguez MD Unavailable         Unavailable

 

                    TROY Rodriguez MD Unavailable         Unavailable

 

                    TROY Rodriguez MD Unavailable         Unavailable

 

                    TROY Rodriguez MD Unavailable         Unavailable

 

                    TROY Rodriguez MD Unavailable         Unavailable

 

                    TROY Rodriguez MD Unavailable         Unavailable

 

                    TROY Rodriguez MD Unavailable         Unavailable

 

                    TROY Rodriguez MD Unavailable         Unavailable

 

                    TROY Rodriguez MD Unavailable         Unavailable

 

                    TROY Rodriguez MD Unavailable         Unavailable

 

                    TROY Rodriguez MD Unavailable         Unavailable

 

                    TROY Rodriguez MD Unavailable         Unavailable

 

                    TROY Rodriguez MD Unavailable         Unavailable

 

                    TROY Rodriguez MD Unavailable         Unavailable

 

                    TROY Rodriguez MD Unavailable         Unavailable

 

                    TROY Rodriguez MD Unavailable         Unavailable

 

                    TROY Rodriguez MD Unavailable         Unavailable

 

                    TROY Rodriguez MD Unavailable         Unavailable

 

                    TROY Rodriguez MD Unavailable         Unavailable

 

                    TROY Rodriguez MD Unavailable         Unavailable

 

                    TROY Rodriguez MD Unavailable         Unavailable

 

                    TROY Rodriguez MD Unavailable         Unavailable

 

                    TROY Rodriguez MD Unavailable         Unavailable

 

                    TROY Rodriguez MD Unavailable         Unavailable

 

                    TROY Rodriguez MD Unavailable         Unavailable

 

                    TROY Rodriguez MD Unavailable         Unavailable

 

                    TROY Rodriguez MD Unavailable         Unavailable

 

                    TROY Rodriguez MD Unavailable         Unavailable

 

                    TROY Rodriguez MD Unavailable         Unavailable

 

                    TROY Rodriguez MD Unavailable         Unavailable

 

                    TROY Rodriguez MD Unavailable         Unavailable

 

                    TROY Rodriguez MD Unavailable         Unavailable

 

                    TROY Rodriguez MD Unavailable         Unavailable

 

                    TROY Rodriguez MD Unavailable         Unavailable

 

                    TROY Rodriguez MD Unavailable         Unavailable

 

                    TROY Rodriguez MD Unavailable         Unavailable

 

                    TROY Rodriguez MD Unavailable         Unavailable

 

                    TROY Rodriguez MD Unavailable         Unavailable

 

                    TROY Rodriguez MD Unavailable         Unavailable

 

                    TROY Rodriguez MD Unavailable         Unavailable

 

                    TROY Rodriguez MD Unavailable         Unavailable

 

                    TROY Rodriguez MD Unavailable         Unavailable

 

                    TROY Rodriguez MD Unavailable         Unavailable

 

                    TROY Rodriguez MD Unavailable         Unavailable

 

                    TROY Rodriguez MD Unavailable         Unavailable

 

                    TROY Rodriguez MD Unavailable         Unavailable

 

                    TROY Rodriguez MD Unavailable         Unavailable

 

                    TROY Rodriguez MD Unavailable         Unavailable

 

                    TROY Rodriguez MD Unavailable         Unavailable

 

                    TROY Rodriguez MD Unavailable         Unavailable

 

                    TROY Rodriguez MD Unavailable         Unavailable

 

                    TROY Rodriguez MD Unavailable         Unavailable

 

                    Michael, O Samah MD Unavailable         Unavailable

 

                    Michael, O Samah MD Unavailable         Unavailable

 

                    Michael, O Samah MD Unavailable         Unavailable

 

                    Michael, O Samah MD Unavailable         Unavailable

 

                    Michael, O Samah MD Unavailable         Unavailable

 

                    Michael, O Samah MD Unavailable         Unavailable

 

                    Michael, O Samah MD Unavailable         Unavailable

 

                    Michael, O Samah MD Unavailable         Unavailable

 

                    Michael, O Samah MD Unavailable         Unavailable

 

                    Michael, O Samah MD Unavailable         Unavailable

 

                    Michael, O Samah MD Unavailable         Unavailable

 

                    Michael, O Samah MD Unavailable         Unavailable

 

                    Michael, O Samah MD Unavailable         Unavailable



                                  



Re-disclosure Warning

          The records that you are about to access may contain information from 
federally-assisted alcohol or drug abuse programs. If such information is 
present, then the following federally mandated warning applies: This information
has been disclosed to you from records protected by federal confidentiality 
rules (42 CFR part 2). The federal rules prohibit you from making any further 
disclosure of this information unless further disclosure is expressly permitted 
by the written consent of the person to whom it pertains or as otherwise 
permitted by 42 CFR part 2. A general authorization for the release of medical 
or other information is NOT sufficient for this purpose. The Federal rules 
restrict any use of the information to criminally investigate or prosecute any 
alcohol or drug abuse patient.The records that you are about to access may 
contain highly sensitive health information, the redisclosure of which is 
protected by Article 27-F of the [facility] Public Health law. If you 
continue you may have access to information: Regarding HIV / AIDS; Provided by 
facilities licensed or operated by the [facility] Office of Mental Health; 
or Provided by the [facility] Office for People With Developmental 
Disabilities. If such information is present, then the following [place] 
mandated warning applies: This information has been disclosed to you from 
confidential records which are protected by state law. State law prohibits you 
from making any further disclosure of this information without the specific 
written consent of the person to whom it pertains, or as otherwise permitted by 
law. Any unauthorized further disclosure in violation of state law may result in
a fine or group home sentence or both. A general authorization for the release of 
medical or other information is NOT sufficient authorization for further disc
losure.                                                                         
    



Allergies and Adverse Reactions

          



           Type       Description Substance  Reaction   Status     Data Source(s

)

 

           Drug allergy Drug allergy Penicillins                       [facility]

 

           Allergy to substance Allergy to substance Penicillin antibiotic produ

ct                       

[facility] ([facility])



                                                                                
                 



Encounters

          



           Encounter  Providers  Location   Date       Indications Data Source(s

)

 

           Outpatient                       2021 12:00:00 AM EDT consult  

  [facility]

 

                                        consult 

 

                Unlisted evaluation and management service Performer: Sakshi sher                 2021

09:57:00 PM EDT - 2021 05:10:00 PM EDT                           [facility] 

([facility])

 

                Unlisted evaluation and management service Performer: Sakshi sher                 2021

08:36:00 PM EDT                                     [facility] ([facility])

 

                Outpatient      Attender: Scout Rodriguez MD Main office - [facility] 07/15/2021 

09:30:00 AM EDT                                     JUAN MIGUEL ([facility], )

 

                                        PIO VargasBC: [address], [place] [address], Ph. [phone]                  [facility]: [facility]       2021 12:00:00 AM EDT                     [facility] ([facility])

 

                                        MIREYA Vargas: [address], [place] [address], Ph. [phone]                  [facility]: [facility]       2021 12:00:00 AM EDT                     [facility]NN[facility] ([facility])

 

                                        MIREYA Vargas: [address]

[address], [place] [address], Ph. [phone]                  Attender: Radha Carrasco [facility]       2021 12:00:00 AM EDT                     [facility]NN[facility] ([facility])

 

                Unlisted evaluation and management service                      

           2021 04:30:00 PM EST - 

2021 10:53:00 PM EST                           [facility] ([facility])

 

           Outpatient Attender: Sammie Markham   2020 02:40:00 PM EST

            [facility] 

([facility])

 

                          Inpatient                 Attender: Angelica PENALOZAtte

nder: ANGELICA JAMES MDAdmitter: ANGELICA JAMES MD                  [facility]  2020 03:42:00 PM EST - 2021 

10:45:00 AM EST 

PSYCHOACTIVE SUBSTANCE DEPENDENCE       [facility]

 

                                        PSYCHOACTIVE SUBSTANCE DEPENDENCE 

 

                                        Patient discharged. 

 

                Unlisted evaluation and management service Performer: Sakshi sher                 2020

08:54:00 PM EST - 2020 07:20:00 PM EST                           [facility] 

([facility])



                                                                                
                                                                                
                          



Medications

          



          Medication Brand Name Start Date Product Form Dose      Route     Admi

nistrative 

Instructions Pharmacy Instructions Status     Indications Reaction   Description

 Data 

Source(s)

 

                          Buprenorphine 12 MG / Naloxone 3 MG Oral Strip Bupreno

rphine 

Hydrochloride/Naloxone Hydrochloride 2020 12:00:00 AM EST                 

          SUBLINGUAL    

                      active                                      [facility] ([facility])

 

                    Clonidine Hydrochloride 0.1 MG Oral Tablet Clonidine HCL    

   2020 12:00:00 AM 

EST                                       active                      [facility] ([facility])

 

                    Hydroxyzine Hydrochloride 25 MG Oral Tablet Hydroxyzine HCL 

    2020 12:00:00 

AM EST                                    active                      [facility] ([facility])

 

                                        Buprenorphine 2 MG / Naloxone 0.5 MG Ora

l Strip buprenorphine 2 mg-naloxone 0.5 

mg sublingual film PLACE ONE FILM UNDER THE TONGUE TWICE A DAY  MAXIMUM DAILY 
DOSE   2 FILMS                          buprenorphine 2 mg-naloxone 0.5 mg subli

ngual film PLACE ONE FILM

UNDER THE TONGUE TWICE A DAY  MAXIMUM DAILY DOSE   2 FILMS                      

                                       completed

                                                buprenorphine 2 MG / naloxone 0.

5 MG Sublingual Film [facility]NNIE ([facility])

 

                                        Nicotine 4 MG Chewing Gum nicotine (eladio

crilex) 4 mg gum CHEW 1 PIECE OF GUM 

EVERY HOUR AS NEEDED FOR NICOTINE CRAVINGS nicotine (polacrilex) 4 mg gum CHEW 1

PIECE OF GUM EVERY HOUR AS NEEDED FOR NICOTINE CRAVINGS                         

                        completed         

                          nicotine 4 MG Chewing Gum [facility]NNIE ([facility])

 

                                        Buprenorphine 2 MG / Naloxone 0.5 MG Ora

l Strip buprenorphine 2 mg-naloxone 0.5 

mg sublingual film PLACE ONE FILM UNDER THE TONGUE TWICE A DAY  MAXIMUM DAILY 
DOSE   2 FILMS                          buprenorphine 2 mg-naloxone 0.5 mg subli

ngual film PLACE ONE FILM

UNDER THE TONGUE TWICE A DAY  MAXIMUM DAILY DOSE   2 FILMS                      

                                       completed

                                                buprenorphine 2 MG / naloxone 0.

5 MG Sublingual Film J[facility]NNIE ([facility])

 

                                        quetiapine 50 MG Oral Tablet quetiapine 

50 mg tablet TAKE ONE TABLET BY MOUTH AT

BEDTIME quetiapine 50 mg tablet TAKE ONE TABLET BY MOUTH AT BEDTIME             

                                    

completed                                       quetiapine 50 MG Oral Tablet ATH

TOYIN ([facility])

 

                                        Ibuprofen 800 MG Oral Tablet ibuprofen 8

00 mg tablet TAKE ONE TABLET BY MOUTH 

EVERY 6 HOURS AS NEEDED FOR PAIN        ibuprofen 800 mg tablet TAKE ONE TABLET 

BY 

MOUTH EVERY 6 HOURS AS NEEDED FOR PAIN                                          

 completed               ibuprofen 800 

MG Oral Tablet                          [facility]NNIE ([facility])

 

                                        Levofloxacin 250 MG Oral Tablet levoflox

acin 250 mg tablet TAKE ONE TABLET BY 

MOUTH DAILY levofloxacin 250 mg tablet TAKE ONE TABLET BY MOUTH DAILY           

                                        

             completed                              levofloxacin 250 MG Oral Tab

let J[facility]NNIE ([facility])

 

                                        Cephalexin 500 MG Oral Capsule cephalexi

n 500 mg capsule TAKE ONE CAPSULE BY 

MOUTH THREE TIMES A DAY FOR 10 DAYS     cephalexin 500 mg capsule TAKE ONE CAPSU

LE 

BY MOUTH THREE TIMES A DAY FOR 10 DAYS                                          

 completed               cephalexin 500 

MG Oral Capsule                         [facility]IE ([facility])

 

                                        Buprenorphine 4 MG / Naloxone 1 MG Oral 

Strip buprenorphine 4 mg-naloxone 1 mg 

sublingual film PLACE ONE FILM UNDER THE TONGUE TWICE A DAY  MAXIMUM DAILY DOSE 
 2 FILMS                                buprenorphine 4 mg-naloxone 1 mg subling

ual film PLACE ONE FILM UNDER 

THE TONGUE TWICE A DAY  MAXIMUM DAILY DOSE   2 FILMS                            

               completed               

buprenorphine 4 MG / naloxone 1 MG Sublingual Film [facility] ([facility])

 

        Tab-A-Violeta 400 mcg tablet TAKE ONE TABLET BY MOUTH EVERY DAY [id number]     

                                             

completed                                       Tab-A-Violeta 400 mcg tablet [facility]

 ([facility])

 

                                        Cephalexin 500 MG Oral Capsule cephalexi

n 500 mg capsule TAKE ONE CAPSULE BY 

MOUTH THREE TIMES A DAY FOR 10 DAYS     cephalexin 500 mg capsule TAKE ONE CAPSU

LE 

BY MOUTH THREE TIMES A DAY FOR 10 DAYS                                          

 completed               cephalexin 500 

MG Oral Capsule                         [facility]IE ([facility])

 

                                        Clotrimazole 10 MG/ML Topical Cream clot

rimazole 1 % topical cream APPLY 

TOPICALLY TO FEET TWO TIMES A DAY       clotrimazole 1 % topical cream APPLY TOP

ICALLY

TO FEET TWO TIMES A DAY                                           completed     

          clotrimazole 10 MG/ML Topical 

Cream                                   [facility]NNIE ([facility])

 

                                        Buprenorphine 4 MG / Naloxone 1 MG Oral 

Strip buprenorphine 4 mg-naloxone 1 mg 

sublingual film PLACE ONE FILM UNDER THE TONGUE TWICE A DAY  MAXIMUM DAILY DOSE 
 2 FILMS                                buprenorphine 4 mg-naloxone 1 mg subling

ual film PLACE ONE FILM UNDER 

THE TONGUE TWICE A DAY  MAXIMUM DAILY DOSE   2 FILMS                            

               completed               

buprenorphine 4 MG / naloxone 1 MG Sublingual Film [facility] ([facility])

 

                                        Doxycycline Monohydrate 100 MG Oral Caps

ule doxycycline monohydrate 100 mg 

capsule TAKE ONE CAPSULE BY MOUTH EVERY 12 HOURS doxycycline monohydrate 100 mg 

capsule TAKE ONE CAPSULE BY MOUTH EVERY 12 HOURS                                

           completed               

doxycycline monohydrate 100 MG Oral Capsule [facility] ([facility])

 

        Tab-A-Violeta 400 mcg tablet TAKE ONE TABLET BY MOUTH EVERY DAY [id number]     

                                             

completed                                       Tab-A-Violeta 400 mcg tablet [facility]

 ([facility])

 

                                        buspirone hydrochloride 5 MG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            Nonreactive Normal (applies to non-numer

ic results)            [facility]                        









                    ID                  Date                Data Source

 

                    [id number] 2020 10:01:00 AM [facility]      Value     Range     Interpretation Code Description Data [place]

rce(s) Supporting 

Document(s)

 

           Sodium     139 mmol/L 137-145    Normal (applies to non-numeric resul

ts)            [facility]                         

 

           Potassium             3.5-5.1    Normal (applies to non-numeric resul

ts)            [facility]                                 

 

           Chloride   106 mmol/L      Normal (applies to non-numeric resul

ts)            [facility]                         

 

           Carbon Dioxide CO2            22.0-33.0  Normal (applies to non-numer

ic results)            [facility]                         

 

           Anion Gap             4.0-11.0   Normal (applies to non-numeric resul

ts)            [facility]                                 

 

           BUN        17 mg/dL   9-20       Normal (applies to non-numeric resul

ts)            [facility]                                 

 

           Creatinine            0.80-1.50  Normal (applies to non-numeric resul

ts)            [facility]                                 

 

           GFR        88 mL/min  >60        Normal (applies to non-numeric resul

ts)            [facility]                                 

 

                                        Result based on MDRD formula. 

 

          Glucose Level 110 mg/dL 74-99     Above high normal           [facility]  

 

                                        The reference range is only applicable w

hen fasting. 

 

           Calcium-Uncorrected            8.4-10.2   Normal (applies to non-nume

nikki results)            [facility]                         

 

           Corrected Calcium            8.4-10.2   Normal (applies to non-numeri

c results)            [facility]                         

 

           Bilirubin,Total            0.2-1.3    Normal (applies to non-numeric 

results)            [facility]                         

 

           Bilirubin,Direct            0.0-0.3    Normal (applies to non-numeric

 results)            [facility]                         

 

          SGOT(AST) 79 U/L    17-59     Above high normal           [facility]  

 

           SGPT(ALT)  51 U/L     21-72      Normal (applies to non-numeric resul

ts)            [facility]                                 

 

           Alkaline Phosphatase 71 U/L          Normal (applies to non-num

brigette results)            

[facility]                  

 

                                        Pregnancy can increase Alkaline Phosp le

vels up to 2 times the normal adult 

value.      Normal values for children and adolescents are 2 to 3 times the 
normal adult value. 

 

          CPK       695 U/L       Above high normal           [facility]  

 

           Total Protein            6.3-8.2    Normal (applies to non-numeric re

sults)            [facility]                                

 

           Albumin               3.5-5.0    Normal (applies to non-numeric resul

ts)            [facility]                                 

 

                Thyroid Stimulate Hormone TSH                 0.358-3.740     No

rmal (applies to non-numeric 

results)                                [facility]  









                    ID                  Date                Data Source

 

                    [id number] 2020 10:01:00 AM EST [facility]









          Name      Value     Range     Interpretation Code Description Data [place]

rce(s) Supporting 

Document(s)

 

           Magnesium             1.80-2.40  Normal (applies to non-numeric resul

ts)            [facility]                                 









                    ID                  Date                Data Source

 

                    [id number] 2020 10:01:00 AM EST [facility]









          Name      Value     Range     Interpretation Code Description Data [place]

rce(s) Supporting 

Document(s)

 

           C-Reactive Protein,Wide Range            <3.00      Above high normal

            [facility]                                 









                    ID                  Date                Data Source

 

                    [id number] 2020 09:31:00 AM EST [facility]









          Name      Value     Range     Interpretation Code Description Data [place]

rce(s) Supporting 

Document(s)

 

           White Blood Count            4.8-10.8   Normal (applies to non-numeri

c results)            [facility]                         

 

           Red Blood Count            4.35-6.08  Normal (applies to non-numeric 

results)            [facility]                         

 

           Hemoglobin            13.0-17.5  Normal (applies to non-numeric resul

ts)            [facility]                                 

 

           Hematocrit            37.7-51.0  Normal (applies to non-numeric resul

ts)            [facility]                                 

 

           Mean Corpuscular Volume            80-94      Normal (applies to non-

numeric results)            [facility]                        

 

             Mean Corpuscular Hemoglobin              27.0-33.0    Normal (appli

es to non-numeric results) 

                          [facility]    

 

           Mean Corpuscular HGB Conc            32.0-36.0  Normal (applies to no

n-numeric results)            

[facility]                  

 

             Red Cell Distribution Width              11.5-14.5    Normal (appli

es to non-numeric results) 

                          [facility]    

 

           Platelet Count 207 X10 3/uL 130-450    Normal (applies to non-numeric

 results)            

[facility]                  

 

           Mean Platelet Volume            9.6-13.1   Normal (applies to non-num

brigette results)            [facility]                        

 

           Imm Grans% (AUTO) 0 %        0-2        Normal (applies to non-numeri

c results)            [facility]                         

 

           Neutrophils % (AUTO) 52 %       40-75      Normal (applies to non-num

brigette results)            [facility]                        

 

           Lymphocytes % (AUTO) 29 %       21-46      Normal (applies to non-num

brigette results)            [facility]                        

 

           Monocytes % (AUTO) 11 %       5-12       Normal (applies to non-numer

ic results)            [facility]                         

 

          Eosinophils % (AUTO) 7 %       1-5       Above high normal           [facility]

[facility]  

 

           Basophils % (AUTO) 1 %        0-1        Normal (applies to non-numer

ic results)            [facility]                         

 

           Imm Grans# (AUTO)            0.0-0.5    Normal (applies to non-numeri

c results)            [facility]                         

 

           Neutrophils # (AUTO)            1.5-8.1    Normal (applies to non-num

brigette results)            [facility]                         

 

           Lymphocytes # (AUTO)            1.0-3.1    Normal (applies to non-num

brigette results)            [facility]                         

 

           Monocytes # (AUTO)            0.2-1.3    Normal (applies to non-numer

ic results)            [facility]                         

 

           Eosinophils# (AUTO)            0.0-0.5    Normal (applies to non-nume

nikki results)            [facility]                         

 

           Basophils # (AUTO)            0.0-0.1    Normal (applies to non-numer

ic results)            [facility]                         









                    ID                  Date                Data Source

 

                    [id number] 2020 02:28:00 PM EST [facility]









          Name      Value     Range     Interpretation Code Description Data [place]

rce(s) Supporting 

Document(s)

 

             Cannabinoids Confirm,Ur result              .            Very abnor

mal (applies to non-numeric units  

                          [facility]    

 

                                        Carboxy THC GC/MS Conf      92          

       ng/mL Cutoff=10        01  

Performed at:  [address]  [id number]  
: Araceli Reyes MD, Phone:  [phone] 









                    ID                  Date                Data Source

 

                    [id number] 2020 09:06:00 PM EST [facility]









          Name      Value     Range     Interpretation Code Description Data [place]

rce(s) Supporting 

Document(s)

 

           Opiate Screen,Urine            Negative   Normal (applies to non-nume

nikki results)            [facility]                         

 

          Amphetamine Screen,Urine           Negative  [facility]  

 

                Benzodiazepines Scrn,Ur result                 Negative        N

ormal (applies to non-numeric 

results)                                [facility]  

 

           Cocaine Screen,Urine            Negative   Normal (applies to non-num

brigette results)            [facility]                        

 

           Methadone Screen,Urine            Negative   Normal (applies to non-n

umeric results)            

[facility]                  

 

          Cannabinoid Screen, Ur           Negative  Ha                  [facility]  

 

                                        Therapeutic Drug Ranges for Emergency an

d Rehabilitation                        

      Threshold Levels (ng/mL)  Cocaine                           300  Opiates  
                         300  Cannabinoids                       50  
Barbiturates                      200  Benzodiazepine                    200  
Methadone                         300  Amphetamines                     1000    
All positive findings are presumptive and unconfirmed.    Confirmation of 
positive results are performed only at request of provider.  Unconfirmed results
 must not be used for non-medical purposes (i.e. pre-employment and legal 
purposes) 









                    ID                  Date                Data Source

 

                    [id number]   2020 06:32:00 PM EST [facility]









          Name      Value     Range     Interpretation Code Description Data [place]

rce(s) Supporting 

Document(s)

 

                                        Respiratory specimen severe acute respir

atory syndrome coronavirus 2 

(SARS-CoV-2) RNA                                             [facility]      

 

                                        This lab was ordered by [facility]

oj and reported by [facility]. 









                    ID                  Date                Data Source

 

                    [id number] 2020 06:18:00 PM EST [facility]









          Name      Value     Range     Interpretation Code Description Data [place]

rce(s) Supporting 

Document(s)

 

           SARS-CoV-2 RNA            Negative   Normal (applies to non-numeric r

esults)            [facility]                         

 

                                        Negative results should be treated as pr

esumptive and, if inconsistent with 

clinical signs and symptoms or necessary for patient management, should be 
tested with different authorized or cleared molecular tests. Negative results do
 not preclude SARS-CoV-2 infection and should not be used as the sole basis for 
patient management decisions. Negative results should be considered in the 
context of a patients recent exposures, history and the presence of clinical 
signs and symptoms consistent with COVID-19.     This test has not been FDA 
cleared or approved; this test has been authorized by FDA under an Emergency Use
 Authorization for use by laboratories certified under the Clinical Laboratory 
Improvement Amendments of 1988 (CLIA), 42 U.S.C. 263a, to perform moderate 
complexity/high complexity tests and at the Point of Care (POC), i.e., in 
patient care settings operating under a CLIA Certificate of Waiver, Certificate 
of Compliance, or Certificate of Accreditation.    Factsheets for healthcare 
providers:  https://www.fda.gov/media/912138/download  Factsheets for patients: 
 https://www.fda.gov/media/585497/download    The ID NOW Instrument is a rapid 
molecular in vitro diagnostic test utilizing an isothermal nucleic acid 
amplification technology intended for the qualitative detection of nucleic acid 
from the SARS-CoV-2 viral RNA.    THIS IS A STATE REPORTABLE COMMUNICABLE 
DISEASE.    Manual entry verified  by Cherie Hood 20 1817 









                    ID                  Date                Data Source

 

                    [id number] 2020 11:55:00 PM EST [facility]









          Name      Value     Range     Interpretation Code Description Data [place]

rce(s) Supporting 

Document(s)

 

           Color,Urine            Yellow     Normal (applies to non-numeric resu

lts)            [facility]                                 

 

           Clarity,Urine            Clear      Normal (applies to non-numeric re

sults)            [facility]                                 

 

           Specific Gravity,Urine            1.001-1.030 Normal (applies to non-

numeric results)            

[facility]                  

 

           PH,Urine              5.0-8.0    Normal (applies to non-numeric resul

ts)            [facility]                                 

 

           Protein,Urine            Negative   Normal (applies to non-numeric re

sults)            [facility]                         

 

           Glucose,Urine (UA)            Negative   Normal (applies to non-numer

ic results)            [facility]                         

 

          Ketones,Urine           Negative  [facility]

ospital  

 

           Blood,Urine            Negative   Normal (applies to non-numeric resu

lts)            [facility]                                 

 

           Bilirubin,Urine            Negative   Normal (applies to non-numeric 

results)            [facility]                         

 

           Urobilinogen,Urine            Norm 0.2-1 Normal (applies to non-numer

ic results)            [facility]                        

 

           Leukocyte Esterase,Urine            Negative   Normal (applies to non

-numeric results)            

[facility]                  

 

           Nitrite,Urine            Negative   Normal (applies to non-numeric re

sults)            [facility]                         







                                        Procedure

 

                                          



                                                                                
                                                                                
                                                                                
                                                                    



Social History

          No Information                                                        
                                



Vital Signs

          



                    ID                  Date                Data Source

 

                    UNK                                      









           Name       Value      Range      Interpretation Code Description Data

 Source(s)

 

           Systolic blood pressure 104.0 MM[HG]                       104.0 MM[H

G] NETSMART ([email or web address])

 

           Diastolic blood pressure 61.0 MM[HG]                       61.0 MM[HG

] NETSMART ([email or web address])

 

           Heart rate 94.0 /MIN                        94.0 /MIN  NETSMART ([employer])

 

           Respiratory rate 16.0 /MIN                        16.0 /MIN  NETSMART

 ([facility])

 

           Body temperature 97.7 [DEGF]                       97.7 [DEGF] NETSMA

RT ([facility])

 

           Systolic blood pressure 117.0 MM[HG]                       117.0 MM[H

G] NETSMART ([facility])

 

           Diastolic blood pressure 62.0 MM[HG]                       62.0 MM[HG

] NETSMART ([facility])

 

           Body temperature 36.5 LACEY                         36.5 LACEY   NETSMART

 ([facility])

 

           Heart rate 64.0 /MIN                        64.0 /MIN  [facility] ([facility])

 

           Body temperature 97.8 [DEGF]                       97.8 [DEGF] NETSMA

RT ([facility])

 

           Body temperature 36.6 LACEY                         36.6 LACEY   NETSMART

 ([facility])

 

           Heart rate 90.0 /MIN                        90.0 /MIN  [facility] ([facility])

 

           Respiratory rate 16.0 /MIN                        16.0 /MIN  NETSMART

 ([facility])

 

           Systolic blood pressure 110.0 MM[HG]                       110.0 MM[H

G] NETSMART ([facility])

 

           Diastolic blood pressure 66.0 MM[HG]                       66.0 MM[HG

] NETSMART ([facility])

 

           Body temperature 36.3 LACEY                         36.3 LACEY   NETSMART

 ([facility])

 

           Body temperature 97.3 [DEGF]                       97.3 [DEGF] NETSMA

RT ([facility])

 

           Heart rate 64.0 /MIN                        64.0 /MIN  [facility] ([facility])

 

           Respiratory rate 16.0 /MIN                        16.0 /MIN  NETSMART

 ([facility])

 

           Systolic blood pressure 118.0 MM[HG]                       118.0 MM[H

G] NETSMART ([facility])

 

           Diastolic blood pressure 69.0 MM[HG]                       69.0 MM[HG

] NETSMART ([facility])

 

           Heart rate 86.0 /MIN                        86.0 /MIN  [facility] ([facility])

 

           Systolic blood pressure 110.0 MM[HG]                       110.0 MM[H

G] NETSMART ([facility])

 

           Diastolic blood pressure 69.0 MM[HG]                       69.0 MM[HG

] NETSMART ([facility])

 

           Respiratory rate 17.0 /MIN                        17.0 /MIN  NETSMART

 ([facility])

 

           Respiratory rate 16.0 /MIN                        16.0 /MIN  NETSMART

 ([facility])

 

           Systolic blood pressure 120.0 MM[HG]                       120.0 MM[H

G] NETSMART ([facility])

 

           Diastolic blood pressure 64.0 MM[HG]                       64.0 MM[HG

] NETSMART ([facility])

 

           Heart rate 71.0 /MIN                        71.0 /MIN  [facility] ([facility])

 

           Body temperature 97.5 [DEGF]                       97.5 [DEGF] NETSMA

RT ([facility])

 

           Body temperature 36.4 LACEY                         36.4 LACEY   NETSMART

 ([facility])

 

           Body temperature 37.1 LACEY                         37.1 LACEY   NETSMART

 ([facility])

 

           Diastolic blood pressure 71.0 MM[HG]                       71.0 MM[HG

] NETSMART ([facility])

 

           Body height 182.9 cm                         182.9 cm   [facility] ([facility])

 

           Body mass index (BMI) [Ratio] 19.0                             19.0  

     [facility]SMART ([facility])

 

           Heart rate 74.0 /MIN                        74.0 /MIN  [facility] ([facility])

 

           Systolic blood pressure 131.0 MM[HG]                       131.0 MM[H

G] NETSMART ([facility])

 

           Body weight 63.6 KG                          63.6 KG    [facility] ([facility])

 

           Body temperature 98.7 [DEGF]                       98.7 [DEGF] NETSMA

RT ([facility])

 

           Respiratory rate 16.0 /MIN                        16.0 /MIN  NETSMART

 ([facility])

 

           Oxygen saturation in Arterial blood by Pulse oximetry 100.0 %        

                  100.0 %    

NETSMART ([facility])

 

           Respiratory rate 12 /min                          12 /min    [facility] (

[facility], )

 

           Body height 72 [in_i]                        72 [in_i]  [facility] ([facility], )

 

                                        6'0" 

 

           Body weight 145.00 [lb_av]                       145.00 [lb_av] MEDEN

T ([facility], 

)

 

           Body mass index (BMI) [Ratio] 19.7 kg/m2                       19.7 k

g/m2 [facility] ([facility], )

 

           Ideal body weight 178 [lb_av]                       178 [lb_av] MEDEN

T ([facility], 

)

 

           Body height 72 [in_i]                        72 [in_i]  [facility] ([facility])

 

           Diastolic blood pressure 62 mm[Hg]                        62 mm[Hg]  

[facility] ([facility])

 

           Body height 72 [in_i]                        72 [in_i]  [facility] ([facility])

 

           Body mass index (BMI) [Ratio] 19.4 kg/m2                       19.4 k

g/m2 [facility] ([facility])

 

           Systolic blood pressure 98 mm[Hg]                        98 mm[Hg]  A

THENA ([facility])

 

           Body weight 2290 [oz_av]                       2290 [oz_av] JOHNNIE ([facility])

 

           Diastolic blood pressure 62 mm[Hg]                        62 mm[Hg]  

JOHNNIE ([facility])

 

           Body height 72 [in_i]                        72 [in_i]  JOHNNIE ([facility])

 

           Body mass index (BMI) [Ratio] 19.4 kg/m2                       19.4 k

g/m2 JOHNNIE ([facility])

 

           Systolic blood pressure 98 mm[Hg]                        98 mm[Hg]  A

THENA ([facility])

 

           Body weight 2290 [oz_av]                       2290 [oz_av] JOHNNIE ([facility])

 

           Body temperature 36.4 LACEY                         36.4 LACEY   NETSMART

 ([facility])

 

           Heart rate 72.0 /MIN                        72.0 /MIN  [facility] ([facility]

 

           Respiratory rate 14.0 /MIN                        14.0 /MIN  NETSMART

 ([facility])

 

           Systolic blood pressure 122.0 MM[HG]                       122.0 MM[H

G] NETSMART ([facility])

 

           Diastolic blood pressure 68.0 MM[HG]                       68.0 MM[HG

] NETSMART ([facility])

 

           Body temperature 97.6 [DEGF]                       97.6 [DEGF] NETSMA

RT ([facility])

 

           Body temperature 36.6 LACEY                         36.6 LACEY   NETSMART

 ([facility])

 

           Heart rate 92.0 /MIN                        92.0 /MIN  [facility] ([facility]

 

           Respiratory rate 17.0 /MIN                        17.0 /MIN  NETSMART

 ([facility])

 

           Systolic blood pressure 114.0 MM[HG]                       114.0 MM[H

G] NETSMART ([facility])

 

           Diastolic blood pressure 74.0 MM[HG]                       74.0 MM[HG

] NETSMART ([facility])

 

           Body temperature 97.8 [DEGF]                       97.8 [DEGF] NETSMA

RT ([facility])

 

           Body temperature 98.2 [DEGF]                       98.2 [DEGF] NETSMA

RT ([facility])

 

           Body temperature 36.8 LACEY                         36.8 LACEY   NETSMART

 ([facility])

 

           Respiratory rate 16.0 /MIN                        16.0 /MIN  NETSMART

 ([facility])

 

           Systolic blood pressure 119.0 MM[HG]                       119.0 MM[H

G] NETSMART ([facility])

 

           Diastolic blood pressure 68.0 MM[HG]                       68.0 MM[HG

] NETSMART ([facility])

 

           Heart rate 79.0 /MIN                        79.0 /MIN  [facility] ([facility]

 

           Body temperature 97.5 [DEGF]                       97.5 [DEGF] NETSMA

RT ([facility])

 

           Body temperature 36.4 LACEY                         36.4 LACEY   NETSMART

 ([facility])

 

           Heart rate 64.0 /MIN                        64.0 /MIN  [facility] ([facility]

 

           Respiratory rate 16.0 /MIN                        16.0 /MIN  NETSMART

 ([facility])

 

           Systolic blood pressure 107.0 MM[HG]                       107.0 MM[H

G] NETSMART ([facility])

 

           Diastolic blood pressure 60.0 MM[HG]                       60.0 MM[HG

] NETSMART ([facility])

 

           Systolic blood pressure 113.0 MM[HG]                       113.0 MM[H

G] NETSMART ([facility])

 

           Diastolic blood pressure 63.0 MM[HG]                       63.0 MM[HG

] NETSMART ([facility])

 

           Respiratory rate 16.0 /MIN                        16.0 /MIN  NETSMART

 ([facility])

 

           Heart rate 53.0 /MIN                        53.0 /MIN  [facility] ([facility]

 

           Body temperature 37.1 LACEY                         37.1 LACEY   NETSMART

 ([facility])

 

           Systolic blood pressure 103.0 MM[HG]                       103.0 MM[H

G] NETSMART ([facility])

 

           Body temperature 98.7 [DEGF]                       98.7 [DEGF] NETSMA

RT ([facility])

 

           Heart rate 74.0 /MIN                        74.0 /MIN  [facility] ([facility]

 

           Diastolic blood pressure 62.0 MM[HG]                       62.0 MM[HG

] NETSMART ([facility])

 

           Respiratory rate 17.0 /MIN                        17.0 /MIN  NETSMART

 ([facility])

 

           Systolic blood pressure 103.0 MM[HG]                       103.0 MM[H

G] NETSMART ([facility])

 

           Body temperature 98.7 [DEGF]                       98.7 [DEGF] NETSMA

RT ([facility])

 

           Heart rate 74.0 /MIN                        74.0 /MIN  [facility] ([facility]

 

           Respiratory rate 17.0 /MIN                        17.0 /MIN  NETSMART

 ([facility])

 

           Body temperature 37.1 LACEY                         37.1 LACEY   NETSMART

 ([facility])

 

           Diastolic blood pressure 62.0 MM[HG]                       62.0 MM[HG

] NETSMART ([facility])

 

           Heart rate 73.0 /MIN                        73.0 /MIN  [facility] ([facility]

 

           Respiratory rate 16.0 /MIN                        16.0 /MIN  NETSMART

 ([facility])

 

           Systolic blood pressure 107.0 MM[HG]                       107.0 MM[H

G] NETSMART ([facility])

 

           Body temperature 97.7 [DEGF]                       97.7 [DEGF] NETSMA

RT ([facility])

 

           Diastolic blood pressure 61.0 MM[HG]                       61.0 MM[HG

] NETSMART ([facility])

 

           Body temperature 36.5 LACEY                         36.5 LACEY   NETSMART

 ([facility])

 

           Body temperature 98.7 [DEGF]                       98.7 [DEGF] NETSMA

RT ([facility])

 

           Heart rate 16.0 /MIN                        16.0 /MIN  [facility] ([facility]

 

           Respiratory rate 16.0 /MIN                        16.0 /MIN  NETSMART

 ([facility])

 

           Systolic blood pressure 136.0 MM[HG]                       136.0 MM[H

G] NETSMART ([facility])

 

           Diastolic blood pressure 76.0 MM[HG]                       76.0 MM[HG

] NETSMART ([facility])

 

           Body temperature 37.1 LACEY                         37.1 LACEY   NETSMART

 ([facility])

 

           Respiratory rate 16.0 /MIN                        16.0 /MIN  NETSMART

 ([facility])

 

           Systolic blood pressure 112.0 MM[HG]                       112.0 MM[H

G] NETSMART ([facility])

 

           Body temperature 97.8 [DEGF]                       97.8 [DEGF] NETSMA

RT ([facility])

 

           Body temperature 36.6 LACEY                         36.6 LACEY   NETSMART

 ([facility])

 

           Heart rate 82.0 /MIN                        82.0 /MIN  [facility] ([facility]

 

           Diastolic blood pressure 70.0 MM[HG]                       70.0 MM[HG

] NETSMART ([facility])

 

           Body temperature 36.8 LACEY                         36.8 LACEY   NETSMART

 ([facility])

 

           Heart rate 87.0 /MIN                        87.0 /MIN  [facility] ([facility]

 

           Respiratory rate 18.0 /MIN                        18.0 /MIN  NETSMART

 ([facility])

 

           Systolic blood pressure 115.0 MM[HG]                       115.0 MM[H

G] NETSMART ([facility])

 

           Diastolic blood pressure 75.0 MM[HG]                       75.0 MM[HG

] NETSMART ([facility])

 

           Body temperature 98.2 [DEGF]                       98.2 [DEGF] NETSMA

RT ([facility])

 

           Body temperature 97.8 [DEGF]                       97.8 [DEGF] NETSMA

RT ([facility])

 

           Body temperature 36.6 LACEY                         36.6 LACEY   NETSMART

 ([facility])

 

           Heart rate 91.0 /MIN                        91.0 /MIN  [facility] ([facility]

 

           Respiratory rate 16.0 /MIN                        16.0 /MIN  NETSMART

 ([facility])

 

           Systolic blood pressure 107.0 MM[HG]                       107.0 MM[H

G] NETSMART ([facility])

 

           Diastolic blood pressure 62.0 MM[HG]                       62.0 MM[HG

] NETSMART ([facility])

 

           Oxygen saturation in Arterial blood by Pulse oximetry 99.0 %         

                  99.0 %     NETSMART

 ([facility])









                    ID                  Date                Data Source

 

                    [id number]           2021 09:35:00 AM EST [facility]









           Name       Value      Range      Interpretation Code Description Data

 Source(s)

 

           Weight Measurement Method 1                                1         

 [facility]

 

           Weight (Calculated Kilograms) 59.42                            59.42 

     [facility]

 

           Weight     2964                             6       [facility]

 

           Temperature Source 7                                7          [facility]

 

           Temperature 97.6                             97.6       [facility]

 

           Respiratory Effort 1                                1          [facility]

 

           Respiratory Rate 16                               16         [facility]

 

           Pulse Assessment Method 4                                4          [facility]

 

           Pulse Rate 66                               66         [facility]

 

           Height (Calculated Centimeters) 180.34                           180.

34     [facility]

 

           Height     71                               71         [facility]

 

           Blood Pressure 130/55                           130/55     [facility]

 

           Body Mass Index (BMI) 18.2                             18.2       [facility]

 

           Weight Measurement Method 1                                1         

 [facility]

 

           Weight (Calculated Kilograms) 59.42                            59.42 

     [facility]

 

           Weight     2096       [facility]

 

           Temperature Source 7                                7          [facility]

 

           Temperature 97.6                             97.6       [facility]

 

           Respiratory Effort 1                                1          [facility]

 

           Respiratory Rate 16                               16         [facility]

 

           Pulse Assessment Method 4                                4          [facility]

 

           Pulse Rate 66                               66         [facility]

 

           Height (Calculated Centimeters) 180.34                           180.

34     [facility]

 

           Height     71                               71         [facility]

 

           Blood Pressure 130/55                           130/55     [facility]

 

           Body Mass Index (BMI) 18.2                             18.2       [facility]

 

           Weight Measurement Method 1                                1         

 [facility]

 

           Weight (Calculated Kilograms) 59.42                            59.42 

     [facility]

 

           Weight     2096       [facility]

 

           Temperature Source 7                                7          [facility]

 

           Temperature 97.6                             97.6       [facility]

 

           Respiratory Effort 1                                1          [facility]

 

           Respiratory Rate 16                               16         [facility]

 

           Pulse Assessment Method 4                                4          [facility]

 

           Pulse Rate 66                               66         [facility]

 

           Height (Calculated Centimeters) 180.34                           180.

34     [facility]

 

           Height     71                               71         [facility]

 

           Blood Pressure 130/55                           130/55     [facility]

 

           Body Mass Index (BMI) 18.2                             18.2       [facility]

 

           Weight Measurement Method 1                                1         

 [facility]

 

           Weight (Calculated Kilograms) 59.42                            59.42 

     [facility]

 

           Weight     2096       [facility]

 

           Temperature Source 7                                7          [facility]

 

           Temperature 96.7                             96.7       [facility]

 

           Respiratory Effort 1                                1          [facility]

 

           Respiratory Rate 16                               16         [facility]

 

           Pulse Assessment Method 4                                4          [facility]

 

           Pulse Rate 49                               49         [facility]

 

           Height (Calculated Centimeters) 180.34                           180.

34     [facility]

 

           Height     71                               71         [facility]

 

           Blood Pressure 96/56                            96/56      [facility]

 

           Body Mass Index (BMI) 18.2                             18.2       [facility]

 

           Weight Measurement Method 1                                1         

 [facility]

 

           Weight (Calculated Kilograms) 59.42                            59.42 

     [facility]

 

           Weight     2096       [facility]

 

           Temperature Source 7                                7          [facility]

 

           Temperature 96.7                             96.7       [facility]

 

           Respiratory Effort 1                                1          [facility]

 

           Respiratory Rate 15                               15         [facility]

 

           Pulse Assessment Method 3                                3          C

[facility]

 

           Pulse Rate 72                               72         [facility]

 

           Height (Calculated Centimeters) 180.34                           180.

34     [facility]

 

           Height     71                               71         [facility]

 

           Blood Pressure 110/68                           110/68     [facility]

 

           Body Mass Index (BMI) 18.2                             18.2       [facility]

 

           Weight Measurement Method 1                                1         

 [facility]

 

           Weight (Calculated Kilograms) 59.42                            59.42 

     [facility]

 

           Weight     2096       [facility]

 

           Temperature Source 7                                7          [facility]

 

           Temperature 98.0                             98.0       [facility]

 

           Respiratory Effort 1                                1          [facility]

 

           Respiratory Rate 16                               16         [facility]

 

           Pulse Assessment Method 4                                4          C

[facility]

 

           Pulse Rate 76                               76         [facility]

 

           Height (Calculated Centimeters) 180.34                           180.

34     [facility]

 

           Height     71                               71         [facility]

 

           Blood Pressure 128/60                           128/60     [facility]

 

           Body Mass Index (BMI) 18.2                             18.2       [facility]



                                                                                
                            



Patient Treatment Plan of Care

          



             Planned Activity Planned Date Details      Description  Data Source

(s)

 

             Tab-A-Violeta 400 mcg tablet TAKE ONE TABLET BY MOUTH EVERY DAY       

                                 JOHNNIE ([facility])

 

             Sulfamethoxazole 800 MG / Trimethoprim 160 MG Oral Tablet          

                              JOHNNIE ([facility])

 

             quetiapine 50 MG Oral Tablet                                       

 JOHNNIE ([facility])

 

             Nicotine 4 MG Chewing Gum                                        AT

[facility])

 

                                        Narcan 4 mg/actuation nasal spray SPRAY 

2 SPRAYS  4MG  IN EACH NOSTRIL AS 

DIRECTED FOR SUSPECTED OPOID OVERDOSE AND CALL 911                              

                   JOHNNIE ([facility])

 

             methylprednisolone 4 mg tablets in a dose pack USE AS DIRECTED     

                                   [facility] 

([facility])

 

             Levofloxacin 250 MG Oral Tablet                                    

    JOHNNIE ([facility])

 

             Ibuprofen 800 MG Oral Tablet                                       

 JOHNNIE ([facility])

 

             Hydroxyzine Hydrochloride 25 MG Oral Tablet                        

                JOHNNIE ([facility])

 

             Doxycycline Monohydrate 100 MG Oral Capsule                        

                JOHNNIE ([facility])

 

             Clotrimazole 10 MG/ML Topical Cream                                

        JOHNNIE ([facility])

 

             Cholecalciferol 1000 UNT Oral Tablet                               

         JOHNNIE ([facility])

 

             Cephalexin 500 MG Oral Capsule                                     

   JOHNNIE ([facility])

 

             buspirone hydrochloride 5 MG Oral Tablet                           

             JOHNNIE ([facility])

 

             Buprenorphine 8 MG / Naloxone 2 MG Oral Strip                      

                  JOHNNIE ([facility])

 

             Buprenorphine 4 MG / Naloxone 1 MG Oral Strip                      

                  JOHNNIE ([facility])

 

             Buprenorphine 2 MG / Naloxone 0.5 MG Oral Strip                    

                    JOHNNIE ([facility])

 

             Tab-A-Violeta 400 mcg tablet TAKE ONE TABLET BY MOUTH EVERY DAY       

                                 JOHNNIE ([facility])

 

             quetiapine 50 MG Oral Tablet                                       

 [facility] ([facility])

 

             Nicotine 4 MG Chewing Gum                                        AT

[facility])

 

                                        Narcan 4 mg/actuation nasal spray SPRAY 

2 SPRAYS  4MG  IN EACH NOSTRIL AS 

DIRECTED FOR SUSPECTED OPOID OVERDOSE AND CALL 911                              

                   JOHNNIE ([facility])

 

             methylprednisolone 4 mg tablets in a dose pack USE AS DIRECTED     

                                   JOHNNIE 

([facility])

 

             Ibuprofen 800 MG Oral Tablet                                       

 JOHNNIE ([facility])

 

             Hydroxyzine Hydrochloride 25 MG Oral Tablet                        

                JOHNNIE ([facility])

 

             Doxycycline Monohydrate 100 MG Oral Capsule                        

                JOHNNIE ([facility])

 

             Clotrimazole 10 MG/ML Topical Cream                                

        JOHNNIE ([facility])

 

             Cholecalciferol 1000 UNT Oral Tablet                               

         JOHNNIE ([facility])

 

             Cephalexin 500 MG Oral Capsule                                     

   JOHNNIE ([facility])

 

             buspirone hydrochloride 5 MG Oral Tablet                           

             JOHNNIE ([facility])

 

             Buprenorphine 8 MG / Naloxone 2 MG Oral Strip                      

                  JOHNNIE ([facility])

 

             Buprenorphine 4 MG / Naloxone 1 MG Oral Strip                      

                  JOHNNIE ([facility])

 

             Buprenorphine 2 MG / Naloxone 0.5 MG Oral Strip                    

                    JOHNNIE ([facility])

## 2021-11-08 LAB
AMPHETAMINES UR QL SCN: POSITIVE
BARBITURATES UR QL SCN: NEGATIVE
BENZODIAZ UR QL SCN: NEGATIVE
BZE UR QL SCN: NEGATIVE
CANNABINOIDS UR QL SCN: POSITIVE
METHADONE UR QL SCN: NEGATIVE
OPIATES UR QL SCN: NEGATIVE
PCP UR QL SCN: NEGATIVE

## 2021-11-09 VITALS — DIASTOLIC BLOOD PRESSURE: 61 MMHG | SYSTOLIC BLOOD PRESSURE: 123 MMHG

## 2021-11-09 LAB — RSV RNA NPH QL NAA+PROBE: NEGATIVE

## 2021-11-09 NOTE — ECGEPIP
[facility] - ED

                                       

                                       Test Date:    2021

Pat Name:     JUWAN HARO     Department:   

Patient ID:   [id number]                 Room:         -

Gender:       Male                     Technician:   JULI

:          1995               Requested By: Maja Lundborg-Gray 

Order Number: [id number]     Reading MD:   Maja Lundborg-Gray

                                 Measurements

Intervals                              Axis          

Rate:         93                       P:            83

VT:           156                      QRS:          96

QRSD:         90                       T:            77

QT:           370                                    

QTc:          460                                    

                           Interpretive Statements

Normal sinus rhythm

Right atrial enlargement

Rightward axis

Pulmonary disease pattern

Poor R wave progression 

Nonspecific ST T wave changes 

Prolonged QTc

 

cw 21 rate increased

Nonspecific ST T wave changes

Electronically Signed on 2021 19:28:06 EST by Maja Lundborg-Gray

## 2021-11-09 NOTE — MHIPNPDOC
[facility] Progress Note


Progress Note


DATE OF SERVICE: 11/9/21





HISTORY: 25-year-old man with a history of polysubstance use who was admitted 

for psychotic features in the context of recent use of amphetamine-based stimula

nts and heroin.  Today he appears much clear, he endorses no suicidal ideation 

as well as no auditory visual hallucinations and no paranoia.  Although he is 

closed off he is willing to engage in the interview and states primarily he 

would like to go home.  Collateral information has been obtained from his 

housemates who have no concerns for his safety and believe he should return home

at this time as well.  Denies having any outpatient psychiatric follow-up and 

denies any desire for the same.





VITAL SIGNS: See below.





NEW TEST RESULTS: See below.





CURRENT MEDICATIONS: See below.





MENTAL STATUS EXAMINATION:


Patient is a 25-year old male, who is male.


Speech: Is speech was spontaneous, clear, with regular rate, rhythm, and volume.


Language skills are intact.


Thought processes including: Linear, logical, goal oriented.


Thought content: Denies SI, HI, AVH; would like to return home so that he can 

relax. Abstract reasoning, and computation: Intact. Description of associations:

Linear.


Description of abnormal or psychotic thoughts: Denies HI or AVH, no paranoia or 

delusions detected.


Judgment: Poor to fair.


Insight: Fair.


Orientation: X3.


Recent and remote memory: Intact.


Attention span and concentration: Intact.


Mood: "I am okay". Affect: Neutral, tired, congruent to stated mood and thought 

content.





DIAGNOSES:


1.  Unspecified psychotic disorder, likely secondary to substance use.


2.  Amphetamine use disorder.


3.  Opioid use disorder.


 


ASSESSMENT: Patient appears to be at baseline and is currently stable.  There 

are no concerns from those who he lives with.  At this time he denies any need 

for treatment, and is not interested in seeking counseling for his substance 

use.  Recommended that he do so, however [facility] is not interested in following 

up with this.  At present he does seems to not demonstrate a danger to himself 

or others other than through continue drug use, however clinical picture may 

fluctuate and change over time.  Currently does not appear to be an indication 

for hospitalization at this time as the psychotic symptoms appear to be 

primarily in the setting of active intoxication.





MANAGEMENT PLAN: Discharge to home, denies need for psychiatric follow-up at 

this time.





TIME SPENT: 15 minutes.





Vital Signs





Vital Signs








  Date Time  Temp Pulse Resp B/P (MAP) Pulse Ox O2 Delivery O2 Flow Rate FiO2


 


11/9/21 08:09 98.3 81 18 136/73 (94) 96   


 


11/8/21 06:31      Room Air  











Laboratory Data


[facility]


Laboratory Tests 2


11/9/21 09:32: 


Coronavirus (COVID-19)(PCR) NEGATIVE, Influenza Type A (RT-PCR) NEGATIVE, 

Influenza Type B (RT-PCR) NEGATIVE, Respiratory Syncytial Virus (PCR) NEGATIVE





Current Medications





Current Medications








 Medications


  (Trade)  Dose


 Ordered  Sig/Rex


 Route


 PRN Reason  Start Time


 Stop Time Status Last Admin


Dose Admin


 


 Home Med


  (Home Med List


 Complete!)    ASDIRECTED


 XX


   11/7/21 20:10


 11/7/21 20:12 DC  





 


 Lorazepam


  (Ativan)  2 mg  STAT  STAT


 IM


   11/7/21 19:26


 11/7/21 19:27 DC 11/7/21 19:38














Allergies


Coded Allergies:  


     Penicillins (Verified  Allergy, Intermediate, 3/4/21)











CARLO AL MD      Nov 9, 2021 13:02

## 2021-11-15 ENCOUNTER — HOSPITAL ENCOUNTER (EMERGENCY)
Dept: HOSPITAL 53 - M ED | Age: 26
Discharge: HOME | End: 2021-11-15
Payer: COMMERCIAL

## 2021-11-15 VITALS — HEIGHT: 72 IN | BODY MASS INDEX: 18.07 KG/M2 | WEIGHT: 133.38 LBS

## 2021-11-15 VITALS — SYSTOLIC BLOOD PRESSURE: 105 MMHG | DIASTOLIC BLOOD PRESSURE: 58 MMHG

## 2021-11-15 DIAGNOSIS — F11.10: Primary | ICD-10-CM

## 2021-11-15 DIAGNOSIS — I51.7: ICD-10-CM

## 2021-11-15 DIAGNOSIS — R00.0: ICD-10-CM

## 2021-11-15 DIAGNOSIS — Z88.0: ICD-10-CM

## 2021-11-15 DIAGNOSIS — I45.19: ICD-10-CM

## 2021-11-15 LAB
ALBUMIN SERPL BCG-MCNC: 3.5 GM/DL (ref 3.2–5.2)
ALT SERPL W P-5'-P-CCNC: 77 U/L (ref 12–78)
APAP SERPL-MCNC: < 2 UG/ML (ref 10–30)
BASOPHILS # BLD AUTO: 0.1 10^3/UL (ref 0–0.2)
BASOPHILS NFR BLD AUTO: 0.4 % (ref 0–1)
BILIRUB CONJ SERPL-MCNC: 0.1 MG/DL (ref 0–0.2)
BILIRUB SERPL-MCNC: 0.3 MG/DL (ref 0.2–1)
BUN SERPL-MCNC: 10 MG/DL (ref 7–18)
CALCIUM SERPL-MCNC: 9.2 MG/DL (ref 8.5–10.1)
CHLORIDE SERPL-SCNC: 105 MEQ/L (ref 98–107)
CO2 SERPL-SCNC: 18 MEQ/L (ref 21–32)
CREAT SERPL-MCNC: 1.32 MG/DL (ref 0.7–1.3)
EOSINOPHIL # BLD AUTO: 0.2 10^3/UL (ref 0–0.5)
EOSINOPHIL NFR BLD AUTO: 1.9 % (ref 0–3)
ETHANOL SERPL-MCNC: < 0.003 % (ref 0–0.01)
GFR SERPL CREATININE-BSD FRML MDRD: > 60 ML/MIN/{1.73_M2} (ref 60–?)
GLUCOSE SERPL-MCNC: 160 MG/DL (ref 70–100)
HCT VFR BLD AUTO: 43.6 % (ref 42–52)
HGB BLD-MCNC: 13.4 G/DL (ref 13.5–17.5)
LYMPHOCYTES # BLD AUTO: 4 10^3/UL (ref 1.5–5)
LYMPHOCYTES NFR BLD AUTO: 32 % (ref 24–44)
MCH RBC QN AUTO: 30.9 PG (ref 27–33)
MCHC RBC AUTO-ENTMCNC: 30.7 G/DL (ref 32–36.5)
MCV RBC AUTO: 100.5 FL (ref 80–96)
MONOCYTES # BLD AUTO: 1.1 10^3/UL (ref 0–0.8)
MONOCYTES NFR BLD AUTO: 8.7 % (ref 2–8)
NEUTROPHILS # BLD AUTO: 6.9 10^3/UL (ref 1.5–8.5)
NEUTROPHILS NFR BLD AUTO: 55.2 % (ref 36–66)
PLATELET # BLD AUTO: 376 10^3/UL (ref 150–450)
POTASSIUM SERPL-SCNC: 3.6 MEQ/L (ref 3.5–5.1)
PROT SERPL-MCNC: 7.2 GM/DL (ref 6.4–8.2)
RBC # BLD AUTO: 4.34 10^6/UL (ref 4.3–6.1)
SALICYLATES SERPL-MCNC: 1.8 MG/DL (ref 5–30)
SODIUM SERPL-SCNC: 142 MEQ/L (ref 136–145)
TSH SERPL DL<=0.005 MIU/L-ACNC: 2.23 UIU/ML (ref 0.36–3.74)
WBC # BLD AUTO: 12.4 10^3/UL (ref 4–10)

## 2021-11-15 PROCEDURE — 99291 CRITICAL CARE FIRST HOUR: CPT

## 2021-11-15 PROCEDURE — 93005 ELECTROCARDIOGRAM TRACING: CPT

## 2021-11-15 PROCEDURE — 80048 BASIC METABOLIC PNL TOTAL CA: CPT

## 2021-11-15 PROCEDURE — 96374 THER/PROPH/DIAG INJ IV PUSH: CPT

## 2021-11-15 PROCEDURE — 96361 HYDRATE IV INFUSION ADD-ON: CPT

## 2021-11-15 PROCEDURE — 85025 COMPLETE CBC W/AUTO DIFF WBC: CPT

## 2021-11-15 PROCEDURE — 94760 N-INVAS EAR/PLS OXIMETRY 1: CPT

## 2021-11-15 PROCEDURE — 84443 ASSAY THYROID STIM HORMONE: CPT

## 2021-11-15 PROCEDURE — 80143 DRUG ASSAY ACETAMINOPHEN: CPT

## 2021-11-15 PROCEDURE — 82550 ASSAY OF CK (CPK): CPT

## 2021-11-15 PROCEDURE — 93041 RHYTHM ECG TRACING: CPT

## 2021-11-15 PROCEDURE — 80076 HEPATIC FUNCTION PANEL: CPT

## 2021-11-15 PROCEDURE — 82077 ASSAY SPEC XCP UR&BREATH IA: CPT

## 2021-11-15 PROCEDURE — 36415 COLL VENOUS BLD VENIPUNCTURE: CPT

## 2021-11-15 NOTE — CCD
Summarization Of Episode

                             Created on: 11/15/2021



JUWAN VILLELA

External Reference #: [id number]

: 1995

Sex: Undifferentiated



Demographics





                          Address                   [address] [place]  [address]

 

                          Home Phone                [phone]

 

                          Preferred Language        English

 

                          Marital Status            Unknown

 

                          Moravian Affiliation     Unknown

 

                          Race                      Unknown

 

                          Ethnic Group              YES





Author





                          Author                    [facility]

 

                          Organization              [facility]

 

                          Address                   Unknown

 

                          Phone                     Unavailable







Support





                Name            Relationship    Address         Phone

 

                UE              Next Of Kin     Unknown         Unavailable

 

                    CN [employer]STRU[employer]    Next Of Kin         UNKNOWN

[place], [place]  [id number] [phone]

 

                    ARTURO HARO    Next Of Kin         -

                          -, -  -                   [phone]

 

                Brenda Haro   Next Of Kin     Unknown         Unavailable

 

                    NURSE, DUTY ON      Next Of Kin         [address]

[place]  [address]                   Unavailable

 

                    Danilo FNP,  Radha Next Of Kin         [address], [place]  [address] [phone]

 

                    ARTURO HARO    ECON                -

                          -, -  -                   [phone]







Care Team Providers





                    Care Team Member Name Role                Phone

 

                    Danilo, A Radha FNP Unavailable         Unavailable

 

                    Danilo, A Radha FNP Unavailable         Unavailable

 

                    Danilo, A Radha FNP Unavailable         Unavailable

 

                    Danilo, A Radha FNP Unavailable         Unavailable

 

                    Danilo, A Radha FNP Unavailable         Unavailable

 

                    Danilo, A Radha FNP Unavailable         Unavailable

 

                    Danilo, A Radha FNP Unavailable         Unavailable

 

                    Danilo, A Radha FNP Unavailable         Unavailable

 

                    Danilo, A Radha FNP Unavailable         Unavailable

 

                    Danilo, A Radha FNP Unavailable         Unavailable

 

                    Danilo, A Radha FNP Unavailable         Unavailable

 

                    Danilo, A Radha FNP Unavailable         Unavailable

 

                    Danilo, A Radha FNP Unavailable         Unavailable

 

                    Danilo, A Radha FNP Unavailable         Unavailable

 

                    Danilo, A Radha FNP Unavailable         Unavailable

 

                    Danilo, A Radha FNP Unavailable         Unavailable

 

                    Danilo, A Radha FNP Unavailable         Unavailable

 

                    Danilo, A Radha FNP Unavailable         Unavailable

 

                    Danilo, A Radha FNP Unavailable         Unavailable

 

                    Danilo, A Radha FNP Unavailable         Unavailable

 

                    Danilo, A Radha FNP Unavailable         Unavailable

 

                    Danilo, A Radha FNP Unavailable         Unavailable

 

                    Danilo, A Radha FNP Unavailable         Unavailable

 

                    Danilo, A Radha FNP Unavailable         Unavailable

 

                    Danilo, A Radha FNP Unavailable         Unavailable

 

                    Danilo, A Radha FNP Unavailable         Unavailable

 

                    Danilo, A Radha FNP Unavailable         Unavailable

 

                    Danilo, A Radha FNP Unavailable         Unavailable

 

                    Danilo, A Radha FNP Unavailable         Unavailable

 

                    Danilo, A Radha FNP Unavailable         Unavailable

 

                    Danilo, A Radha FNP Unavailable         Unavailable

 

                    ANGELICA JAMES MD    Unavailable         Unavailable

 

                    ANGELICA JAMES MD    Unavailable         Unavailable

 

                    ANGELICA JAMES MD    Unavailable         Unavailable

 

                    ANGELICA JAMES MD    Unavailable         Unavailable

 

                    ANGELICA JAMES MD    Unavailable         Unavailable

 

                    ANGELICA JAMES MD    Unavailable         Unavailable

 

                    ANGELICA JAMES MD    Unavailable         Unavailable

 

                    Jose James MD Unavailable         Unavailable

 

                    Janey Hickey Unavailable         Unavailable

 

                    Locke, E Sammie       Unavailable         Unavailable

 

                    Locke, E Sammie       Unavailable         Unavailable

 

                    Locke, E Sammie       Unavailable         Unavailable

 

                    Locke, E Sammie       Unavailable         Unavailable

 

                    Locke, E Sammie       Unavailable         Unavailable

 

                    Locke, E Sammie       Unavailable         Unavailable

 

                    Locke, E Sammie       Unavailable         Unavailable

 

                    Locke, E Sammie       Unavailable         Unavailable

 

                    Locke, E Sammie       Unavailable         Unavailable

 

                    Locke, E Sammie       Unavailable         Unavailable

 

                    Locke, E Sammie       Unavailable         Unavailable

 

                    Locke, E Sammie       Unavailable         Unavailable

 

                    Locke, E Smamie       Unavailable         Unavailable

 

                    Locke, E Sammie       Unavailable         Unavailable

 

                    Locke, E Sammie       Unavailable         Unavailable

 

                    Locke, E Sammie       Unavailable         Unavailable

 

                    Locke, E Sammie       Unavailable         Unavailable

 

                    TROY Rodriguez MD Unavailable         Unavailable

 

                    TROY Rodriguez MD Unavailable         Unavailable

 

                    TROY Rodriguez MD Unavailable         Unavailable

 

                    TROY Rodriguez MD Unavailable         Unavailable

 

                    TROY Rodriguez MD Unavailable         Unavailable

 

                    TROY Rodriguez MD Unavailable         Unavailable

 

                    TROY Rodriguez MD Unavailable         Unavailable

 

                    TROY Rodriguez MD Unavailable         Unavailable

 

                    TROY Rodriguez MD Unavailable         Unavailable

 

                    TROY Rodriguez MD Unavailable         Unavailable

 

                    TROY Rodriguez MD Unavailable         Unavailable

 

                    TROY Rodriguez MD Unavailable         Unavailable

 

                    TROY Rodriguez MD Unavailable         Unavailable

 

                    TROY Rodriguez MD Unavailable         Unavailable

 

                    TROY Rodriguez MD Unavailable         Unavailable

 

                    TROY Rodriguez MD Unavailable         Unavailable

 

                    TROY Rodriguez MD Unavailable         Unavailable

 

                    TROY Rodriguez MD Unavailable         Unavailable

 

                    TROY Rodriguez MD Unavailable         Unavailable

 

                    TROY Rodriguez MD Unavailable         Unavailable

 

                    TROY Rodriguez MD Unavailable         Unavailable

 

                    TROY Rodriguez MD Unavailable         Unavailable

 

                    TROY Rodriguez MD Unavailable         Unavailable

 

                    TROY Rodriguez MD Unavailable         Unavailable

 

                    TROY Rodriguez MD Unavailable         Unavailable

 

                    TROY Rodriguez MD Unavailable         Unavailable

 

                    TROY Rodriguez MD Unavailable         Unavailable

 

                    TROY Rodriguez MD Unavailable         Unavailable

 

                    TROY Rodriguez MD Unavailable         Unavailable

 

                    TROY Rodriguez MD Unavailable         Unavailable

 

                    TROY Rodriguez MD Unavailable         Unavailable

 

                    TROY Rodriguez MD Unavailable         Unavailable

 

                    TROY Rodriguez MD Unavailable         Unavailable

 

                    TROY Rodriguez MD Unavailable         Unavailable

 

                    TROY Rodriguez MD Unavailable         Unavailable

 

                    TROY Rodriguez MD Unavailable         Unavailable

 

                    TROY Rodriguez MD Unavailable         Unavailable

 

                    TROY Rodriguez MD Unavailable         Unavailable

 

                    TROY Rodriguez MD Unavailable         Unavailable

 

                    TROY Rodriguez MD Unavailable         Unavailable

 

                    TROY Rodriguez MD Unavailable         Unavailable

 

                    TROY Rodriguez MD Unavailable         Unavailable

 

                    TROY Rodriguez MD Unavailable         Unavailable

 

                    TROY Rodriguez MD Unavailable         Unavailable

 

                    TROY Rodriguez MD Unavailable         Unavailable

 

                    TROY Rodriguez MD Unavailable         Unavailable

 

                    TROY Rodriguez MD Unavailable         Unavailable

 

                    TROY Rodriguez MD Unavailable         Unavailable

 

                    TROY Rodriguez MD Unavailable         Unavailable

 

                    TROY Rodriguez MD Unavailable         Unavailable

 

                    TROY Rodriguez MD Unavailable         Unavailable

 

                    TROY Rodriguez MD Unavailable         Unavailable

 

                    TROY Rodriguez MD Unavailable         Unavailable

 

                    TROY Rodriguez MD Unavailable         Unavailable

 

                    TROY Rodriguez MD Unavailable         Unavailable

 

                    TROY Rodriguez MD Unavailable         Unavailable

 

                    TROY Rodriguez MD Unavailable         Unavailable

 

                    TROY Rodriguez MD Unavailable         Unavailable

 

                    TROY Rodriguez MD Unavailable         Unavailable

 

                    TROY Rodriguez MD Unavailable         Unavailable

 

                    TROY Rodriguez MD Unavailable         Unavailable

 

                    TROY Rodriguez MD Unavailable         Unavailable

 

                    TROY Rodriguez MD Unavailable         Unavailable

 

                    TROY Rodriguez MD Unavailable         Unavailable

 

                    TROY Rodriguez MD Unavailable         Unavailable

 

                    TROY Rodriguez MD Unavailable         Unavailable

 

                    Michael, O Samah MD Unavailable         Unavailable

 

                    Michael, O Samah MD Unavailable         Unavailable

 

                    Michael, O Samah MD Unavailable         Unavailable

 

                    Michael, O Samah MD Unavailable         Unavailable

 

                    Michael, O Samah MD Unavailable         Unavailable

 

                    Michael, O Samah MD Unavailable         Unavailable

 

                    Michael, O Samah MD Unavailable         Unavailable

 

                    Michael, O Samah MD Unavailable         Unavailable

 

                    Michael, O Samah MD Unavailable         Unavailable

 

                    Michael, O Samah MD Unavailable         Unavailable

 

                    Michael, O Samah MD Unavailable         Unavailable

 

                    Michael, O Samah MD Unavailable         Unavailable

 

                    Michael, O Samah MD Unavailable         Unavailable



                                  



Re-disclosure Warning

          The records that you are about to access may contain information from 
federally-assisted alcohol or drug abuse programs. If such information is 
present, then the following federally mandated warning applies: This information
has been disclosed to you from records protected by federal confidentiality 
rules (42 CFR part 2). The federal rules prohibit you from making any further 
disclosure of this information unless further disclosure is expressly permitted 
by the written consent of the person to whom it pertains or as otherwise 
permitted by 42 CFR part 2. A general authorization for the release of medical 
or other information is NOT sufficient for this purpose. The Federal rules 
restrict any use of the information to criminally investigate or prosecute any 
alcohol or drug abuse patient.The records that you are about to access may 
contain highly sensitive health information, the redisclosure of which is 
protected by Article 27-F of the [facility] Public Health law. If you 
continue you may have access to information: Regarding HIV / AIDS; Provided by 
facilities licensed or operated by the [facility] Office of Mental Health; 
or Provided by the [facility] Office for People With Developmental 
Disabilities. If such information is present, then the following [place] 
mandated warning applies: This information has been disclosed to you from 
confidential records which are protected by state law. State law prohibits you 
from making any further disclosure of this information without the specific 
written consent of the person to whom it pertains, or as otherwise permitted by 
law. Any unauthorized further disclosure in violation of state law may result in
a fine or [facility] sentence or both. A general authorization for the release of 
medical or other information is NOT sufficient authorization for further disc
losure.                                                                         
    



Allergies and Adverse Reactions

          



           Type       Description Substance  Reaction   Status     Data Source(s

)

 

           Drug allergy Drug allergy Penicillins                       [facility]

 

           Allergy to substance Allergy to substance Penicillin antibiotic produ

ct                       

[facility] ([facility])



                                                                                
                 



Encounters

          



           Encounter  Providers  Location   Date       Indications Data Source(s

)

 

           Outpatient                       2021 12:00:00 AM EDT [facility]

 

                                        consult 

 

                Unlisted evaluation and management service Performer: Gian tovar                 2021

09:57:00 PM EDT - 2021 05:10:00 PM EDT                           [facility] 

([facility])

 

                Unlisted evaluation and management service Performer: Gian tovar                 2021

08:36:00 PM EDT                                     [facility] ([facility])

 

                Outpatient      Attender: Scout Rodriguez MD Main office - [facility] 07/15/2021 

09:30:00 AM EDT                                     JUAN MIGUEL ([facility], )

 

                                        PIO VargasBC: [address], [place] [address], Ph. [phone]                  [facility]: [facility]       2021 12:00:00 AM EDT                     JOHNNIE ([facility])

 

                                        MIREYA Vargas: [address]

[address], [place] [address], Ph. [phone]                  Attender: Radha Carrasco [facility]       2021 12:00:00 AM EDT                     JOHNNIE ([facility])

 

                                        PIO VargasBC: [address]

[address], [place] [address], Ph. [phone]                  Attender: Radha Carrasco [facility]       2021 12:00:00 AM EDT                     JOHNNIE ([facility])

 

                Unlisted evaluation and management service                      

           2021 04:30:00 PM EST - 

2021 10:53:00 PM EST                           [facility] ([facility])

 

           Outpatient Attender: Sammie Markham   2020 02:40:00 PM EST

            [facility] 

([facility])

 

                          Inpatient                 Attender: Angelica PENALOZAtte

nder: ANGELICA JAMES MDAdmitter: ANGELICA JAMES MD                  [facility]  2020 03:42:00 PM EST - 2021 

10:45:00 AM EST 

PSYCHOACTIVE SUBSTANCE DEPENDENCE       [facility]

 

                                        PSYCHOACTIVE SUBSTANCE DEPENDENCE 

 

                                        Patient discharged. 

 

                Unlisted evaluation and management service Performer: Gian tovar                 2020

08:54:00 PM EST - 2020 07:20:00 PM EST                           [facility] 

([facility])



                                                                                
                                                                                
                          



Medications

          



          Medication Brand Name Start Date Product Form Dose      Route     Admi

nistrative 

Instructions Pharmacy Instructions Status     Indications Reaction   Description

 Data 

Source(s)

 

                          Buprenorphine 12 MG / Naloxone 3 MG Oral Strip Bupreno

rphine 

Hydrochloride/Naloxone Hydrochloride 2020 12:00:00 AM EST                 

          SUBLINGUAL    

                      active                                      [facility] ([facility])

 

                    Clonidine Hydrochloride 0.1 MG Oral Tablet Clonidine HCL    

   2020 12:00:00 AM 

EST                                       active                      [facility] ([facility])

 

                    Hydroxyzine Hydrochloride 25 MG Oral Tablet Hydroxyzine HCL 

    2020 12:00:00 

AM EST                                    active                      [facility] ([facility])

 

                                        Buprenorphine 2 MG / Naloxone 0.5 MG Ora

l Strip buprenorphine 2 mg-naloxone 0.5 

mg sublingual film PLACE ONE FILM UNDER THE TONGUE TWICE A DAY  MAXIMUM DAILY 
DOSE   2 FILMS                          buprenorphine 2 mg-naloxone 0.5 mg subli

ngual film PLACE ONE FILM

UNDER THE TONGUE TWICE A DAY  MAXIMUM DAILY DOSE   2 FILMS                      

                                       completed

                                                buprenorphine 2 MG / naloxone 0.

5 MG Sublingual Film JOHNNIE ([facility])

 

                                        Nicotine 4 MG Chewing Gum nicotine (eladio

crilex) 4 mg gum CHEW 1 PIECE OF GUM 

EVERY HOUR AS NEEDED FOR NICOTINE CRAVINGS nicotine (polacrilex) 4 mg gum CHEW 1

PIECE OF GUM EVERY HOUR AS NEEDED FOR NICOTINE CRAVINGS                         

                        completed         

                          nicotine 4 MG Chewing Gum JOHNNIE ([facility])

 

                                        Buprenorphine 2 MG / Naloxone 0.5 MG Ora

l Strip buprenorphine 2 mg-naloxone 0.5 

mg sublingual film PLACE ONE FILM UNDER THE TONGUE TWICE A DAY  MAXIMUM DAILY 
DOSE   2 FILMS                          buprenorphine 2 mg-naloxone 0.5 mg subli

ngual film PLACE ONE FILM

UNDER THE TONGUE TWICE A DAY  MAXIMUM DAILY DOSE   2 FILMS                      

                                       completed

                                                buprenorphine 2 MG / naloxone 0.

5 MG Sublingual Film JOHNNIE ([facility])

 

                                        quetiapine 50 MG Oral Tablet quetiapine 

50 mg tablet TAKE ONE TABLET BY MOUTH AT

BEDTIME quetiapine 50 mg tablet TAKE ONE TABLET BY MOUTH AT BEDTIME             

                                    

completed                                       quetiapine 50 MG Oral Tablet ATH

TOYIN ([facility])

 

                                        Ibuprofen 800 MG Oral Tablet ibuprofen 8

00 mg tablet TAKE ONE TABLET BY MOUTH 

EVERY 6 HOURS AS NEEDED FOR PAIN        ibuprofen 800 mg tablet TAKE ONE TABLET 

BY 

MOUTH EVERY 6 HOURS AS NEEDED FOR PAIN                                          

 completed               ibuprofen 800 

MG Oral Tablet                          JOHNNIE ([facility])

 

                                        Levofloxacin 250 MG Oral Tablet levoflox

acin 250 mg tablet TAKE ONE TABLET BY 

MOUTH DAILY levofloxacin 250 mg tablet TAKE ONE TABLET BY MOUTH DAILY           

                                        

             completed                              levofloxacin 250 MG Oral Tab

let JOHNNIE ([facility])

 

                                        Cephalexin 500 MG Oral Capsule cephalexi

n 500 mg capsule TAKE ONE CAPSULE BY 

MOUTH THREE TIMES A DAY FOR 10 DAYS     cephalexin 500 mg capsule TAKE ONE CAPSU

LE 

BY MOUTH THREE TIMES A DAY FOR 10 DAYS                                          

 completed               cephalexin 500 

MG Oral Capsule                         JOHNNIE ([facility])

 

                                        Buprenorphine 4 MG / Naloxone 1 MG Oral 

Strip buprenorphine 4 mg-naloxone 1 mg 

sublingual film PLACE ONE FILM UNDER THE TONGUE TWICE A DAY  MAXIMUM DAILY DOSE 
 2 FILMS                                buprenorphine 4 mg-naloxone 1 mg subling

ual film PLACE ONE FILM UNDER 

THE TONGUE TWICE A DAY  MAXIMUM DAILY DOSE   2 FILMS                            

               completed               

buprenorphine 4 MG / naloxone 1 MG Sublingual Film [place] ([facility])

 

        Tab-A-Violeta 400 mcg tablet TAKE ONE TABLET BY MOUTH EVERY DAY [id number]     

                                             

completed                                       Tab-A-Violeta 400 mcg tablet [place]

 ([facility])

 

                                        Cephalexin 500 MG Oral Capsule cephalexi

n 500 mg capsule TAKE ONE CAPSULE BY 

MOUTH THREE TIMES A DAY FOR 10 DAYS     cephalexin 500 mg capsule TAKE ONE CAPSU

LE 

BY MOUTH THREE TIMES A DAY FOR 10 DAYS                                          

 completed               cephalexin 500 

MG Oral Capsule                         JOHNNIE ([facility])

 

                                        Clotrimazole 10 MG/ML Topical Cream clot

rimazole 1 % topical cream APPLY 

TOPICALLY TO FEET TWO TIMES A DAY       clotrimazole 1 % topical cream APPLY TOP

ICALLY

TO FEET TWO TIMES A DAY                                           completed     

          clotrimazole 10 MG/ML Topical 

Cream                                   JOHNNIE ([facility])

 

                                        Buprenorphine 4 MG / Naloxone 1 MG Oral 

Strip buprenorphine 4 mg-naloxone 1 mg 

sublingual film PLACE ONE FILM UNDER THE TONGUE TWICE A DAY  MAXIMUM DAILY DOSE 
 2 FILMS                                buprenorphine 4 mg-naloxone 1 mg subling

ual film PLACE ONE FILM UNDER 

THE TONGUE TWICE A DAY  MAXIMUM DAILY DOSE   2 FILMS                            

               completed               

buprenorphine 4 MG / naloxone 1 MG Sublingual Film [place] ([facility])

 

                                        Doxycycline Monohydrate 100 MG Oral Caps

ule doxycycline monohydrate 100 mg 

capsule TAKE ONE CAPSULE BY MOUTH EVERY 12 HOURS doxycycline monohydrate 100 mg 

capsule TAKE ONE CAPSULE BY MOUTH EVERY 12 HOURS                                

           completed               

doxycycline monohydrate 100 MG Oral Capsule [place] ([facility])

 

        Tab-A-Violeta 400 mcg tablet TAKE ONE TABLET BY MOUTH EVERY DAY [id number]     

                                             

completed                                       Tab-A-Violeta 400 mcg tablet [place]

 ([facility])

 

                                        buspirone hydrochloride 5 MG Oral Tablet

 buspirone 5 mg tablet TAKE ONE TABLET 

BY MOUTH THREE TIMES A DAY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  10-30 ng/mL:  Insufficient   

ng/mL: Sufficient  >100 ng/mL:   Toxicity possible 









                    ID                  Date                Data Source

 

                    [id number] 2020 10:54:00 AM [facility]      Value     Range     Interpretation Code Description Data [facility]

rce(s) Supporting 

Document(s)

 

           Syphilis Serology            Nonreactive Normal (applies to non-numer

ic results)            [facility]                        









                    ID                  Date                Data Source

 

                    [id number] 2020 10:01:00 AM EST [facility]









          Name      Value     Range     Interpretation Code Description Data [facility]

rce(s) Supporting 

Document(s)

 

           Sodium     139 mmol/L 137-145    Normal (applies to non-numeric resul

ts)            [facility]                         

 

           Potassium             3.5-5.1    Normal (applies to non-numeric resul

ts)            [facility]                                 

 

           Chloride   106 mmol/L      Normal (applies to non-numeric resul

ts)            [facility]                         

 

           Carbon Dioxide CO2            22.0-33.0  Normal (applies to non-numer

ic results)            [facility]                         

 

           Anion Gap             4.0-11.0   Normal (applies to non-numeric resul

ts)            [facility]                                 

 

           BUN        17 mg/dL   9-20       Normal (applies to non-numeric resul

ts)            [facility]                                 

 

           Creatinine            0.80-1.50  Normal (applies to non-numeric resul

ts)            [facility]                                 

 

           GFR        88 mL/min  >60        Normal (applies to non-numeric resul

ts)            [facility]                                 

 

                                        Result based on MDRD formula. 

 

          Glucose Level 110 mg/dL 74-99     Above high normal           [facility]  

 

                                        The reference range is only applicable w

hen fasting. 

 

           Calcium-Uncorrected            8.4-10.2   Normal (applies to non-nume

nikki results)            [facility]                         

 

           Corrected Calcium            8.4-10.2   Normal (applies to non-numeri

c results)            [facility]                         

 

           Bilirubin,Total            0.2-1.3    Normal (applies to non-numeric 

results)            [facility]                         

 

           Bilirubin,Direct            0.0-0.3    Normal (applies to non-numeric

 results)            [facility]                         

 

          SGOT(AST) 79 U/L    17-59     Above high normal           [facility]  

 

           SGPT(ALT)  51 U/L     21-72      Normal (applies to non-numeric resul

ts)            [facility]                                 

 

           Alkaline Phosphatase 71 U/L          Normal (applies to non-num

brigette results)            

[facility]                  

 

                                        Pregnancy can increase Alkaline Phosp le

vels up to 2 times the normal adult 

value.      Normal values for children and adolescents are 2 to 3 times the 
normal adult value. 

 

          CPK       695 U/L       Above high normal           [facility]  

 

           Total Protein            6.3-8.2    Normal (applies to non-numeric re

sults)            [facility]                                

 

           Albumin               3.5-5.0    Normal (applies to non-numeric resul

ts)            [facility]                                 

 

                Thyroid Stimulate Hormone TSH                 0.358-3.740     No

rmal (applies to non-numeric 

results)                                [facility]  









                    ID                  Date                Data Source

 

                    [id number] 2020 10:01:00 AM EST [facility]









          Name      Value     Range     Interpretation Code Description Data [facility]

rce(s) Supporting 

Document(s)

 

           Magnesium             1.80-2.40  Normal (applies to non-numeric resul

ts)            [facility]                                 









                    ID                  Date                Data Source

 

                    [id number] 2020 10:01:00 AM [facility]









          Name      Value     Range     Interpretation Code Description Data [facility]

rce(s) Supporting 

Document(s)

 

           C-Reactive Protein,Wide Range            <3.00      Above high normal

            [facility]                                 









                    ID                  Date                Data Source

 

                    [id number] 2020 09:31:00 AM [facility]









          Name      Value     Range     Interpretation Code Description Data [facility]

rce(s) Supporting 

Document(s)

 

           White Blood Count            4.8-10.8   Normal (applies to non-numeri

c results)            [facility]                         

 

           Red Blood Count            4.35-6.08  Normal (applies to non-numeric 

results)            [facility]                         

 

           Hemoglobin            13.0-17.5  Normal (applies to non-numeric resul

ts)            [facility]                                 

 

           Hematocrit            37.7-51.0  Normal (applies to non-numeric resul

ts)            [facility]                                 

 

           Mean Corpuscular Volume            80-94      Normal (applies to non-

numeric results)            [facility]                        

 

             Mean Corpuscular Hemoglobin              27.0-33.0    Normal (appli

es to non-numeric results) 

                          [facility]    

 

           Mean Corpuscular HGB Conc            32.0-36.0  Normal (applies to no

n-numeric results)            

[facility]                  

 

             Red Cell Distribution Width              11.5-14.5    Normal (appli

es to non-numeric results) 

                          [facility]    

 

           Platelet Count 207 X10 3/uL 130-450    Normal (applies to non-numeric

 results)            

[facility]                  

 

           Mean Platelet Volume            9.6-13.1   Normal (applies to non-num

brigette results)            [facility]                        

 

           Imm Grans% (AUTO) 0 %        0-2        Normal (applies to non-numeri

c results)            [facility]                         

 

           Neutrophils % (AUTO) 52 %       40-75      Normal (applies to non-num

brigette results)            [facility]                        

 

           Lymphocytes % (AUTO) 29 %       21-46      Normal (applies to non-num

brigette results)            [facility]                        

 

           Monocytes % (AUTO) 11 %       5-12       Normal (applies to non-numer

ic results)            [facility]                         

 

          Eosinophils % (AUTO) 7 %       1-5       Above high normal           [facility]  

 

           Basophils % (AUTO) 1 %        0-1        Normal (applies to non-numer

ic results)            [facility]                         

 

           Imm Grans# (AUTO)            0.0-0.5    Normal (applies to non-numeri

c results)            [facility]                         

 

           Neutrophils # (AUTO)            1.5-8.1    Normal (applies to non-num

brigette results)            [facility]                         

 

           Lymphocytes # (AUTO)            1.0-3.1    Normal (applies to non-num

brigette results)            [facility]                         

 

           Monocytes # (AUTO)            0.2-1.3    Normal (applies to non-numer

ic results)            [facility]                         

 

           Eosinophils# (AUTO)            0.0-0.5    Normal (applies to non-nume

nikki results)            [facility]                         

 

           Basophils # (AUTO)            0.0-0.1    Normal (applies to non-numer

ic results)            [facility]                         









                    ID                  Date                Data Source

 

                    [id number] 2020 02:28:00 PM EST [facility]









          Name      Value     Range     Interpretation Code Description Data [facility]

rce(s) Supporting 

Document(s)

 

             Cannabinoids Confirm,Ur result              .            Very abnor

mal (applies to non-numeric units  

                          [facility]    

 

                                        Carboxy THC GC/MS Conf      92          

       ng/mL Cutoff=10        01  

Performed at:  [facility]  [id number]  
: Araceli Reyes MD, Phone:  [phone] 









                    ID                  Date                Data Source

 

                    [id number] 2020 09:06:00 PM EST [facility]









          Name      Value     Range     Interpretation Code Description Data [facility]

rce(s) Supporting 

Document(s)

 

           Opiate Screen,Urine            Negative   Normal (applies to non-nume

nikki results)            [facility]                         

 

          Amphetamine Screen,Urine           Negative  Velazquez                  [facility]  

 

                Benzodiazepines Scrn,Ur result                 Negative        N

ormal (applies to non-numeric 

results)                                [facility]  

 

           Cocaine Screen,Urine            Negative   Normal (applies to non-num

brigette results)            [facility]                        

 

           Methadone Screen,Urine            Negative   Normal (applies to non-n

umeric results)            

[facility]                  

 

          Cannabinoid Screen, Ur           Negative  Ha                  [facility]  

 

                                        Therapeutic Drug Ranges for Emergency an

d Rehabilitation                        

      Threshold Levels (ng/mL)  Cocaine                           300  Opiates  
                         300  Cannabinoids                       50  
Barbiturates                      200  Benzodiazepine                    200  
Methadone                         300  Amphetamines                     1000    
All positive findings are presumptive and unconfirmed.    Confirmation of 
positive results are performed only at request of provider.  Unconfirmed results
 must not be used for non-medical purposes (i.e. pre-employment and legal 
purposes) 









                    ID                  Date                Data Source

 

                    [id number]   2020 06:32:00 PM EST [facility]









          Name      Value     Range     Interpretation Code Description Data [facility]

rce(s) Supporting 

Document(s)

 

                                        Respiratory specimen severe acute respir

atory syndrome coronavirus 2 

(SARS-CoV-2) RNA                                             [facility]      

 

                                        This lab was ordered by [facility]

oj and reported by [facility]. 









                    ID                  Date                Data Source

 

                    [id number] 2020 06:18:00 PM [facility]









          Name      Value     Range     Interpretation Code Description Data [facility]

rce(s) Supporting 

Document(s)

 

           SARS-CoV-2 RNA            Negative   Normal (applies to non-numeric r

esults)            [facility]                         

 

                                        Negative results should be treated as pr

esumptive and, if inconsistent with 

clinical signs and symptoms or necessary for patient management, should be 
tested with different authorized or cleared molecular tests. Negative results do
 not preclude SARS-CoV-2 infection and should not be used as the sole basis for 
patient management decisions. Negative results should be considered in the 
context of a patients recent exposures, history and the presence of clinical 
signs and symptoms consistent with COVID-19.     This test has not been FDA 
cleared or approved; this test has been authorized by FDA under an Emergency Use
 Authorization for use by laboratories certified under the Clinical Laboratory 
Improvement Amendments of 1988 (CLIA), 42 U.S.C. 263a, to perform moderate 
complexity/high complexity tests and at the Point of Care (POC), i.e., in 
patient care settings operating under a CLIA Certificate of Waiver, Certificate 
of Compliance, or Certificate of Accreditation.    Factsheets for healthcare 
providers:  https://www.fda.gov/media/388156/download  Factsheets for patients: 
 https://www.fda.gov/media/322562/download    The ID NOW Instrument is a rapid 
molecular in vitro diagnostic test utilizing an isothermal nucleic acid 
amplification technology intended for the qualitative detection of nucleic acid 
from the SARS-CoV-2 viral RNA.    THIS IS A STATE REPORTABLE COMMUNICABLE 
DISEASE.    Manual entry verified  by Cherie Hood 20 









                    ID                  Date                Data Source

 

                    [id number] 2020 11:55:00 PM EST [facility]









          Name      Value     Range     Interpretation Code Description Data [place]

rce(s) Supporting 

Document(s)

 

           Color,Urine            Yellow     Normal (applies to non-numeric resu

lts)            [facility]                                 

 

           Clarity,Urine            Clear      Normal (applies to non-numeric re

sults)            [facility]                                 

 

           Specific Gravity,Urine            1.001-1.030 Normal (applies to non-

numeric results)            

[facility]                  

 

           PH,Urine              5.0-8.0    Normal (applies to non-numeric resul

ts)            [facility]                                 

 

           Protein,Urine            Negative   Normal (applies to non-numeric re

sults)            [facility]                         

 

           Glucose,Urine (UA)            Negative   Normal (applies to non-numer

ic results)            [facility]                         

 

          Ketones,Urine           Negative  [facility]

ospital  

 

           Blood,Urine            Negative   Normal (applies to non-numeric resu

lts)            [facility]                                 

 

           Bilirubin,Urine            Negative   Normal (applies to non-numeric 

results)            [facility]                         

 

           Urobilinogen,Urine            Norm 0.2-1 Normal (applies to non-numer

ic results)            [facility]                        

 

           Leukocyte Esterase,Urine            Negative   Normal (applies to non

-numeric results)            

[facility]                  

 

           Nitrite,Urine            Negative   Normal (applies to non-numeric re

sults)            [facility]                         







                                        Procedure

 

                                          



                                                                                
                                                                                
                                                                                
                                                                              



Social History

          No Information                                                        
                                



Vital Signs

          



                    ID                  Date                Data Source

 

                    UNK                                      









           Name       Value      Range      Interpretation Code Description Data

 Source(s)

 

           Systolic blood pressure 104.0 MM[HG]                       104.0 MM[H

G] NETSMART ([facility])

 

           Diastolic blood pressure 61.0 MM[HG]                       61.0 MM[HG

] NETSMART ([facility])

 

           Heart rate 94.0 /MIN                        94.0 /MIN  [facility] ([facility]

 

           Respiratory rate 16.0 /MIN                        16.0 /MIN  NETSMART

 ([facility])

 

           Systolic blood pressure 117.0 MM[HG]                       117.0 MM[H

G] NETSMART ([facility])

 

           Body temperature 97.7 [DEGF]                       97.7 [DEGF] NETSMA

RT ([facility])

 

           Body temperature 36.5 LACEY                         36.5 LACEY   NETSMART

 ([facility])

 

           Diastolic blood pressure 62.0 MM[HG]                       62.0 MM[HG

] NETSMART ([facility])

 

           Heart rate 64.0 /MIN                        64.0 /MIN  [facility]SM[facility] ([facility]

 

           Body temperature 97.8 [DEGF]                       97.8 [DEGF] NETSMA

RT ([facility])

 

           Body temperature 36.6 LACEY                         36.6 LACEY   NETSMART

 ([facility])

 

           Heart rate 90.0 /MIN                        90.0 /MIN  [facility] ([facility]

 

           Respiratory rate 16.0 /MIN                        16.0 /MIN  NETSMART

 ([facility])

 

           Systolic blood pressure 110.0 MM[HG]                       110.0 MM[H

G] NETSMART ([facility])

 

           Diastolic blood pressure 66.0 MM[HG]                       66.0 MM[HG

] NETSMART ([facility])

 

           Body temperature 97.3 [DEGF]                       97.3 [DEGF] NETSMA

RT ([facility])

 

           Body temperature 36.3 LACEY                         36.3 LACEY   NETSMART

 ([facility])

 

           Heart rate 64.0 /MIN                        64.0 /MIN  [facility] ([facility])

 

           Respiratory rate 16.0 /MIN                        16.0 /MIN  NETSMART

 ([facility])

 

           Systolic blood pressure 118.0 MM[HG]                       118.0 MM[H

G] NETSMART ([facility])

 

           Diastolic blood pressure 69.0 MM[HG]                       69.0 MM[HG

] NETSMART ([facility])

 

           Heart rate 86.0 /MIN                        86.0 /MIN  [facility] ([facility]

 

           Systolic blood pressure 110.0 MM[HG]                       110.0 MM[H

G] NETSMART ([facility])

 

           Diastolic blood pressure 69.0 MM[HG]                       69.0 MM[HG

] NETSMART ([facility])

 

           Respiratory rate 17.0 /MIN                        17.0 /MIN  NETSMART

 ([facility])

 

           Respiratory rate 16.0 /MIN                        16.0 /MIN  NETSMART

 ([facility])

 

           Systolic blood pressure 120.0 MM[HG]                       120.0 MM[H

G] NETSMART ([facility])

 

           Diastolic blood pressure 64.0 MM[HG]                       64.0 MM[HG

] NETSMART ([facility])

 

           Heart rate 71.0 /MIN                        71.0 /MIN  [facility] ([facility]

 

           Body temperature 97.5 [DEGF]                       97.5 [DEGF] NETSMA

RT ([facility])

 

           Body temperature 36.4 LACEY                         36.4 LACEY   NETSMART

 ([facility])

 

           Body temperature 37.1 LACEY                         37.1 LACEY   NETSMART

 ([facility])

 

           Body temperature 98.7 [DEGF]                       98.7 [DEGF] NETSMA

RT ([facility])

 

           Diastolic blood pressure 71.0 MM[HG]                       71.0 MM[HG

] NETSMART ([facility])

 

           Heart rate 74.0 /MIN                        74.0 /MIN  [facility] ([facility])

 

           Body height 182.9 cm                         182.9 cm   [facility]

 

           Body weight 63.6 KG                          63.6 KG    [facility] ([facility])

 

           Body mass index (BMI) [Ratio] 19.0                             19.0  

     NETSMART ([facility])

 

           Systolic blood pressure 131.0 MM[HG]                       131.0 MM[H

G] NETSMART ([facility])

 

           Respiratory rate 16.0 /MIN                        16.0 /MIN  NETSMART

 ([facility])

 

           Oxygen saturation in Arterial blood by Pulse oximetry 100.0 %        

                  100.0 %    

NETSMART ([facility])

 

           Ideal body weight 178 [lb_av]                       178 [lb_av] MEDEN

T ([facility], 

)

 

           Respiratory rate 12 /min                          12 /min    MEDENT (

[facility] )

 

           Body height 72 [in_i]                        72 [in_i]  MEDENT ([facility], )

 

                                        6'0" 

 

           Body weight 145.00 [lb_av]                       145.00 [lb_av] DESI

T ([facility] 

PC)

 

           Body mass index (BMI) [Ratio] 19.7 kg/m2                       19.7 k

g/m2 MEDENT ([facility] 

[facility])

 

           Body height 72 [in_i]                        72 [in_i]  JOHNNIE ([facility])

 

           Diastolic blood pressure 62 mm[Hg]                        62 mm[Hg]  

JOHNNIE ([facility])

 

           Body height 72 [in_i]                        72 [in_i]  JOHNNIE ([facility])

 

           Body mass index (BMI) [Ratio] 19.4 kg/m2                       19.4 k

g/m2 JOHNNIE ([facility])

 

           Systolic blood pressure 98 mm[Hg]                        98 mm[Hg]  A

MIKEA ([facility])

 

           Body weight 2290 [oz_av]                       2290 [oz_av] JOHNNIE ([facility])

 

           Diastolic blood pressure 62 mm[Hg]                        62 mm[Hg]  

JOHNNIE ([facility])

 

           Body height 72 [in_i]                        72 [in_i]  JOHNNIE ([facility])

 

           Body mass index (BMI) [Ratio] 19.4 kg/m2                       19.4 k

g/m2 JOHNNIE ([facility])

 

           Systolic blood pressure 98 mm[Hg]                        98 mm[Hg]  A

MIKEA ([facility])

 

           Body weight 2290 [oz_av]                       2290 [oz_av] JOHNNIE ([facility])

 

           Body temperature 36.4 LACEY                         36.4 LACEY   NETSMART

 ([facility])

 

           Heart rate 72.0 /MIN                        72.0 /MIN  NETSMART ([facility])

 

           Respiratory rate 14.0 /MIN                        14.0 /MIN  NETSMART

 ([facility])

 

           Systolic blood pressure 122.0 MM[HG]                       122.0 MM[H

G] NETSMART ([facility])

 

           Diastolic blood pressure 68.0 MM[HG]                       68.0 MM[HG

] NETSMART ([facility])

 

           Body temperature 97.6 [DEGF]                       97.6 [DEGF] NETSMA

RT ([facility])

 

           Body temperature 36.6 LACEY                         36.6 LACEY   NETSMART

 ([facility])

 

           Body temperature 97.8 [DEGF]                       97.8 [DEGF] NETSMA

RT ([facility])

 

           Respiratory rate 17.0 /MIN                        17.0 /MIN  NETSMART

 ([facility])

 

           Systolic blood pressure 114.0 MM[HG]                       114.0 MM[H

G] NETSMART ([facility])

 

           Diastolic blood pressure 74.0 MM[HG]                       74.0 MM[HG

] NETSMART ([facility])

 

           Heart rate 92.0 /MIN                        92.0 /MIN  [facility]SM[facility] ([facility]

 

           Body temperature 98.2 [DEGF]                       98.2 [DEGF] NETSMA

RT ([facility])

 

           Body temperature 36.8 LACEY                         36.8 LACEY   NETSMART

 ([facility])

 

           Respiratory rate 16.0 /MIN                        16.0 /MIN  NETSMART

 ([facility])

 

           Systolic blood pressure 119.0 MM[HG]                       119.0 MM[H

G] NETSMART ([facility])

 

           Diastolic blood pressure 68.0 MM[HG]                       68.0 MM[HG

] NETSMART ([facility])

 

           Heart rate 79.0 /MIN                        79.0 /MIN  [facility]SMART ([facility]

 

           Body temperature 97.5 [DEGF]                       97.5 [DEGF] NETSMA

RT ([facility])

 

           Body temperature 36.4 LACEY                         36.4 LACEY   NETSMART

 ([facility])

 

           Heart rate 64.0 /MIN                        64.0 /MIN  [facility]SMART ([facility]

 

           Respiratory rate 16.0 /MIN                        16.0 /MIN  NETSMART

 ([facility])

 

           Systolic blood pressure 107.0 MM[HG]                       107.0 MM[H

G] NETSMART ([facility])

 

           Diastolic blood pressure 60.0 MM[HG]                       60.0 MM[HG

] NETSMART ([facility])

 

           Systolic blood pressure 113.0 MM[HG]                       113.0 MM[H

G] NETSMART ([facility])

 

           Diastolic blood pressure 63.0 MM[HG]                       63.0 MM[HG

] NETSMART ([facility])

 

           Respiratory rate 16.0 /MIN                        16.0 /MIN  NETSMART

 ([facility])

 

           Heart rate 53.0 /MIN                        53.0 /MIN  [facility]SM[facility] ([facility]

 

           Body temperature 98.7 [DEGF]                       98.7 [DEGF] NETSMA

RT ([facility])

 

           Systolic blood pressure 103.0 MM[HG]                       103.0 MM[H

G] NETSMART ([facility])

 

           Diastolic blood pressure 62.0 MM[HG]                       62.0 MM[HG

] NETSMART ([facility])

 

           Body temperature 37.1 LACEY                         37.1 LACEY   NETSMART

 ([facility])

 

           Heart rate 74.0 /MIN                        74.0 /MIN  [facility] ([facility]

 

           Respiratory rate 17.0 /MIN                        17.0 /MIN  NETSMART

 ([facility])

 

           Systolic blood pressure 103.0 MM[HG]                       103.0 MM[H

G] NETSMART ([facility])

 

           Body temperature 98.7 [DEGF]                       98.7 [DEGF] NETSMA

RT ([facility])

 

           Heart rate 74.0 /MIN                        74.0 /MIN  [facility]SMART ([facility]

 

           Body temperature 37.1 LACEY                         37.1 LACEY   NETSMART

 ([facility])

 

           Respiratory rate 17.0 /MIN                        17.0 /MIN  NETSMART

 ([facility])

 

           Diastolic blood pressure 62.0 MM[HG]                       62.0 MM[HG

] NETSMART ([facility])

 

           Heart rate 73.0 /MIN                        73.0 /MIN  [facility] ([facility]

 

           Respiratory rate 16.0 /MIN                        16.0 /MIN  NETSMART

 ([facility])

 

           Systolic blood pressure 107.0 MM[HG]                       107.0 MM[H

G] NETSMART ([facility])

 

           Body temperature 97.7 [DEGF]                       97.7 [DEGF] NETSMA

RT ([facility])

 

           Diastolic blood pressure 61.0 MM[HG]                       61.0 MM[HG

] NETSMART ([facility])

 

           Body temperature 36.5 LACEY                         36.5 LACEY   NETSMART

 ([facility])

 

           Body temperature 98.7 [DEGF]                       98.7 [DEGF] NETSMA

RT ([facility])

 

           Heart rate 16.0 /MIN                        16.0 /MIN  [facility]SMART ([facility]

 

           Respiratory rate 16.0 /MIN                        16.0 /MIN  NETSMART

 ([facility])

 

           Systolic blood pressure 136.0 MM[HG]                       136.0 MM[H

G] NETSMART ([facility])

 

           Diastolic blood pressure 76.0 MM[HG]                       76.0 MM[HG

] NETSMART ([facility])

 

           Body temperature 37.1 LACEY                         37.1 LACEY   NETSMART

 ([facility])

 

           Respiratory rate 16.0 /MIN                        16.0 /MIN  NETSMART

 ([facility])

 

           Systolic blood pressure 112.0 MM[HG]                       112.0 MM[H

G] NETSMART ([facility])

 

           Body temperature 97.8 [DEGF]                       97.8 [DEGF] NETSMA

RT ([facility])

 

           Body temperature 36.6 LACEY                         36.6 LACEY   NETSMART

 ([facility])

 

           Heart rate 82.0 /MIN                        82.0 /MIN  [facility]SMART ([facility]

 

           Diastolic blood pressure 70.0 MM[HG]                       70.0 MM[HG

] NETSMART ([facility])

 

           Body temperature 36.8 LACEY                         36.8 LACEY   NETSMART

 ([facility])

 

           Heart rate 87.0 /MIN                        87.0 /MIN  [facility]SM[facility] ([facility]

 

           Respiratory rate 18.0 /MIN                        18.0 /MIN  NETSMART

 ([facility])

 

           Systolic blood pressure 115.0 MM[HG]                       115.0 MM[H

G] NETSMART ([facility])

 

           Diastolic blood pressure 75.0 MM[HG]                       75.0 MM[HG

] NETSMART ([facility])

 

           Body temperature 98.2 [DEGF]                       98.2 [DEGF] NETSMA

RT ([facility])

 

           Body temperature 97.8 [DEGF]                       97.8 [DEGF] NETSMA

RT ([facility])

 

           Heart rate 91.0 /MIN                        91.0 /MIN  [facility]SMART ([facility]

 

           Respiratory rate 16.0 /MIN                        16.0 /MIN  NETSMART

 ([facility])

 

           Systolic blood pressure 107.0 MM[HG]                       107.0 MM[H

G] NETSMART ([facility])

 

           Diastolic blood pressure 62.0 MM[HG]                       62.0 MM[HG

] NETSMART ([facility])

 

           Body temperature 36.6 LACEY                         36.6 LACEY   NETSMART

 ([facility])

 

           Oxygen saturation in Arterial blood by Pulse oximetry 99.0 %         

                  99.0 %     NETSMART

 ([facility])









                    ID                  Date                Data Source

 

                    [id number]           2021 09:35:00 AM EST [facility]









           Name       Value      Range      Interpretation Code Description Data

 Source(s)

 

           Weight Measurement Method 1                                1         

 [facility]

 

           Weight (Calculated Kilograms) 59.42                            59.42 

     [facility]

 

           Weight     4315                             6       [facility]

 

           Temperature Source 7                                7          [facility]

 

           Temperature 97.6                             97.6       [facility]

 

           Respiratory Effort 1                                1          [facility]

 

           Respiratory Rate 16                               16         [facility]

 

           Pulse Assessment Method 4                                4          [facility]

 

           Pulse Rate 66                               66         [facility]

 

           Height (Calculated Centimeters) 180.34                           180.

34     [facility]

 

           Height     71                               71         [facility]

 

           Blood Pressure 130/55                           130/55     [facility]

 

           Body Mass Index (BMI) 18.2                             18.2       [facility]

 

           Weight Measurement Method 1                                1         

 [facility]

 

           Weight (Calculated Kilograms) 59.42                            59.42 

     [facility]

 

           Weight     2096       [facility]

 

           Temperature Source 7                                7          [facility]

 

           Temperature 97.6                             97.6       [facility]

 

           Respiratory Effort 1                                1          [facility]

 

           Respiratory Rate 16                               16         [facility]

 

           Pulse Assessment Method 4                                4          [facility]

 

           Pulse Rate 66                               66         [facility]

 

           Height (Calculated Centimeters) 180.34                           180.

34     [facility]

 

           Height     71                               71         [facility]

 

           Blood Pressure 130/55                           130/55     [facility]

 

           Body Mass Index (BMI) 18.2                             18.2       [facility]

 

           Weight Measurement Method 1                                1         

 [facility]

 

           Weight (Calculated Kilograms) 59.42                            59.42 

     [facility]

 

           Weight     2096       [facility]

 

           Temperature Source 7                                7          [facility]

 

           Temperature 97.6                             97.6       [facility]

 

           Respiratory Effort 1                                1          [facility]

 

           Respiratory Rate 16                               16         [facility]

 

           Pulse Assessment Method 4                                4          [facility]

 

           Pulse Rate 66                               66         [facility]

 

           Height (Calculated Centimeters) 180.34                           180.

34     [facility]

 

           Height     71                               71         [facility]

 

           Blood Pressure 130/55                           130/55     [facility]

 

           Body Mass Index (BMI) 18.2                             18.2       [facility]

 

           Weight Measurement Method 1                                1         

 [facility]

 

           Weight (Calculated Kilograms) 59.42                            59.42 

     [facility]

 

           Weight     2096       [facility]

 

           Temperature Source 7                                7          [facility]

 

           Temperature 96.7                             96.7       [facility]

 

           Respiratory Effort 1                                1          [facility]

 

           Respiratory Rate 16                               16         [facility]

 

           Pulse Assessment Method 4                                4          [facility]

 

           Pulse Rate 49                               49         [facility]

 

           Height (Calculated Centimeters) 180.34                           180.

34     [facility]

 

           Height     71                               71         [facility]

 

           Blood Pressure 96/56                            96/56      [facility]

 

           Body Mass Index (BMI) 18.2                             18.2       [facility]

 

           Weight Measurement Method 1                                1         

 [facility]

 

           Weight (Calculated Kilograms) 59.42                            59.42 

     [facility]

 

           Weight     6                             6       [facility]

 

           Temperature Source 7                                7          [facility]

 

           Temperature 96.7                             96.7       [facility]

 

           Respiratory Effort 1                                1          [facility]

 

           Respiratory Rate 15                               15         [facility]

 

           Pulse Assessment Method 3                                3          [facility]

 

           Pulse Rate 72                               72         [facility]

 

           Height (Calculated Centimeters) 180.34                           180.

34     [facility]

 

           Height     71                               71         [facility]

 

           Blood Pressure 110/68                           110/68     [facility]

 

           Body Mass Index (BMI) 18.2                             18.2       [facility]

 

           Weight Measurement Method 1                                1         

 [facility]

 

           Weight (Calculated Kilograms) 59.42                            59.42 

     [facility]

 

           Weight     6                             6       [facility]

 

           Temperature Source 7                                7          [facility]

 

           Temperature 98.0                             98.0       [facility]

 

           Respiratory Effort 1                                1          [facility]

 

           Respiratory Rate 16                               16         [facility]

 

           Pulse Assessment Method 4                                4          [facility]

 

           Pulse Rate 76                               76         [facility]

 

           Height (Calculated Centimeters) 180.34                           180.

34     [facility]

 

           Height     71                               71         [facility]

 

           Blood Pressure 128/60                           128/60     [facility]

 

           Body Mass Index (BMI) 18.2                             18.2       [facility]



                                                                                
                            



Patient Treatment Plan of Care

          



             Planned Activity Planned Date Details      Description  Data Source

(s)

 

             Tab-A-Violeta 400 mcg tablet TAKE ONE TABLET BY MOUTH EVERY DAY       

                                 JOHNNIE ([facility])

 

             Sulfamethoxazole 800 MG / Trimethoprim 160 MG Oral Tablet          

                              [place] ([facility])

 

             quetiapine 50 MG Oral Tablet                                       

 [place] ([facility])

 

             Nicotine 4 MG Chewing Gum                                        AT

[facility] ([facility])

 

                                        Narcan 4 mg/actuation nasal spray SPRAY 

2 SPRAYS  4MG  IN EACH NOSTRIL AS 

DIRECTED FOR SUSPECTED OPOID OVERDOSE AND CALL 911                              

                   [place] ([facility])

 

             methylprednisolone 4 mg tablets in a dose pack USE AS DIRECTED     

                                   JOHNNIE 

([facility])

 

             Levofloxacin 250 MG Oral Tablet                                    

    JOHNNIE ([facility])

 

             Ibuprofen 800 MG Oral Tablet                                       

 JOHNNIE ([facility])

 

             Hydroxyzine Hydrochloride 25 MG Oral Tablet                        

                JOHNNIE ([facility])

 

             Doxycycline Monohydrate 100 MG Oral Capsule                        

                JOHNNIE ([facility])

 

             Clotrimazole 10 MG/ML Topical Cream                                

        JOHNNIE ([facility])

 

             Cholecalciferol 1000 UNT Oral Tablet                               

         JOHNNIE ([facility])

 

             Cephalexin 500 MG Oral Capsule                                     

   JOHNNIE ([facility])

 

             buspirone hydrochloride 5 MG Oral Tablet                           

             JOHNNIE ([facility])

 

             Buprenorphine 8 MG / Naloxone 2 MG Oral Strip                      

                  JOHNNIE ([facility])

 

             Buprenorphine 4 MG / Naloxone 1 MG Oral Strip                      

                  JOHNNIE ([facility])

 

             Buprenorphine 2 MG / Naloxone 0.5 MG Oral Strip                    

                    JOHNNIE ([facility])

 

             Tab-A-Violeta 400 mcg tablet TAKE ONE TABLET BY MOUTH EVERY DAY       

                                 JOHNNIE ([facility])

 

             quetiapine 50 MG Oral Tablet                                       

 JOHNNIE ([facility])

 

             Nicotine 4 MG Chewing Gum                                        AT

[facility])

 

                                        Narcan 4 mg/actuation nasal spray SPRAY 

2 SPRAYS  4MG  IN EACH NOSTRIL AS 

DIRECTED FOR SUSPECTED OPOID OVERDOSE AND CALL 911                              

                   JOHNNIE ([facility])

 

             methylprednisolone 4 mg tablets in a dose pack USE AS DIRECTED     

                                   JOHNNIE 

([facility])

 

             Ibuprofen 800 MG Oral Tablet                                       

 JOHNNIE ([facility])

 

             Hydroxyzine Hydrochloride 25 MG Oral Tablet                        

                JOHNNIE ([facility])

 

             Doxycycline Monohydrate 100 MG Oral Capsule                        

                JOHNNIE ([facility])

 

             Clotrimazole 10 MG/ML Topical Cream                                

        JOHNNIE ([facility])

 

             Cholecalciferol 1000 UNT Oral Tablet                               

         JOHNNIE ([facility])

 

             Cephalexin 500 MG Oral Capsule                                     

   JOHNNIE ([facility])

 

             buspirone hydrochloride 5 MG Oral Tablet                           

             JOHNNIE ([facility])

 

             Buprenorphine 8 MG / Naloxone 2 MG Oral Strip                      

                  JOHNNIE ([facility])

 

             Buprenorphine 4 MG / Naloxone 1 MG Oral Strip                      

                  J[facility]NNIE ([facility])

 

             Buprenorphine 2 MG / Naloxone 0.5 MG Oral Strip                    

                    JOHNNIE ([facility])

## 2021-11-15 NOTE — CCD
Summarization Of Episode

                             Created on: 11/15/2021



JUWAN VILLELA

External Reference #: [id number]

: 1995

Sex: Undifferentiated



Demographics





                          Address                   [address] [place]  [address]

 

                          Home Phone                [phone]

 

                          Preferred Language        English

 

                          Marital Status            Unknown

 

                          Denominational Affiliation     Unknown

 

                          Race                      Unknown

 

                          Ethnic Group              YES





Author





                          Author                    [facility]

 

                          Organization              [facility]

 

                          Address                   Unknown

 

                          Phone                     Unavailable







Support





                Name            Relationship    Address         Phone

 

                UE              Next Of Kin     Unknown         Unavailable

 

                    CN CONSTRUCTION    Next Of Kin         UNKNOWN

[place], [place]  [address] [phone]

 

                    ARTURO HARO    Next Of Kin         -

                          -, -  -                   [phone]

 

                Brenda Haro   Next Of Kin     Unknown         Unavailable

 

                    NURSE, DUTY ON      Next Of Kin         [address] [facility] [address] [facility] [address]

[place]  [address]                   Unavailable

 

                    Danilo FNP,  Radha Next Of Kin         [address], [place]  [address] [phone]

 

                    ARTURO HARO    ECON                -

                          -, -  -                   [phone]







Care Team Providers





                    Care Team Member Name Role                Phone

 

                    Danilo, A Radha FNP Unavailable         Unavailable

 

                    Danilo, A Radha FNP Unavailable         Unavailable

 

                    Danilo, A Radha FNP Unavailable         Unavailable

 

                    Danilo, A Radha FNP Unavailable         Unavailable

 

                    Danilo, A Radha FNP Unavailable         Unavailable

 

                    Danilo, A Radha FNP Unavailable         Unavailable

 

                    Danilo, A Radha FNP Unavailable         Unavailable

 

                    Danilo, A Radha FNP Unavailable         Unavailable

 

                    Danilo, A Radha FNP Unavailable         Unavailable

 

                    Danilo, A Radha FNP Unavailable         Unavailable

 

                    Danilo, A Radha FNP Unavailable         Unavailable

 

                    Danilo, A Radha FNP Unavailable         Unavailable

 

                    Danilo, A Radha FNP Unavailable         Unavailable

 

                    Danilo, A Radha FNP Unavailable         Unavailable

 

                    Danilo, A Radha FNP Unavailable         Unavailable

 

                    Danilo, A Radha FNP Unavailable         Unavailable

 

                    Danilo, A Radha FNP Unavailable         Unavailable

 

                    Danilo, A Radha FNP Unavailable         Unavailable

 

                    Danilo, A Radha FNP Unavailable         Unavailable

 

                    Danilo, A Radha FNP Unavailable         Unavailable

 

                    Danilo, A Radha FNP Unavailable         Unavailable

 

                    Danilo, A Radha FNP Unavailable         Unavailable

 

                    Danilo, A Radha FNP Unavailable         Unavailable

 

                    Danilo, A Radha FNP Unavailable         Unavailable

 

                    Danilo, A Radha FNP Unavailable         Unavailable

 

                    Danilo, A Radha FNP Unavailable         Unavailable

 

                    Danilo, A Radha FNP Unavailable         Unavailable

 

                    Danilo, A Radha FNP Unavailable         Unavailable

 

                    Danilo, A Radha FNP Unavailable         Unavailable

 

                    Danilo, A Radha FNP Unavailable         Unavailable

 

                    Danilo, A Radha FNP Unavailable         Unavailable

 

                    ANGELICA JAMES MD    Unavailable         Unavailable

 

                    ANGELICA JAMES MD    Unavailable         Unavailable

 

                    ANGELICA JAMES MD    Unavailable         Unavailable

 

                    ANGELICA JAMES MD    Unavailable         Unavailable

 

                    ANGELICA JAMES MD    Unavailable         Unavailable

 

                    ANGELICA JAMES MD    Unavailable         Unavailable

 

                    ANGELICA JAMES MD    Unavailable         Unavailable

 

                    KANDI Pozo Unavailable         Unavailable

 

                    Jose James MD Unavailable         Unavailable

 

                    Locke, E Sammie       Unavailable         Unavailable

 

                    Locke, E Sammie       Unavailable         Unavailable

 

                    Locke, E Sammie       Unavailable         Unavailable

 

                    Locke, E Sammie       Unavailable         Unavailable

 

                    Locke, E Sammie       Unavailable         Unavailable

 

                    Locke, E Sammie       Unavailable         Unavailable

 

                    Locke, E Sammie       Unavailable         Unavailable

 

                    Locke, E Sammie       Unavailable         Unavailable

 

                    Locke, E Sammie       Unavailable         Unavailable

 

                    Locke, E Sammie       Unavailable         Unavailable

 

                    Locke, E Sammie       Unavailable         Unavailable

 

                    Locke, E Sammie       Unavailable         Unavailable

 

                    Locke, E Sammie       Unavailable         Unavailable

 

                    Locke, E Sammie       Unavailable         Unavailable

 

                    Locke, E Sammie       Unavailable         Unavailable

 

                    Locke, E Sammie       Unavailable         Unavailable

 

                    Locke, E Sammie       Unavailable         Unavailable

 

                    TROY Rodriguez MD Unavailable         Unavailable

 

                    TROY Rodriguez MD Unavailable         Unavailable

 

                    TROY Rodriguez MD Unavailable         Unavailable

 

                    TROY Rodriguez MD Unavailable         Unavailable

 

                    TROY Rodriguez MD Unavailable         Unavailable

 

                    TROY Rodriguez MD Unavailable         Unavailable

 

                    TROY Rodriguez MD Unavailable         Unavailable

 

                    TROY Rodriguez MD Unavailable         Unavailable

 

                    TROY Rodriguez MD Unavailable         Unavailable

 

                    TROY Rodriguez MD Unavailable         Unavailable

 

                    TROY Rodriguez MD Unavailable         Unavailable

 

                    TROY Rodriguez MD Unavailable         Unavailable

 

                    TROY Rodriguez MD Unavailable         Unavailable

 

                    TROY Rodriguez MD Unavailable         Unavailable

 

                    TROY Rodriguez MD Unavailable         Unavailable

 

                    TRYO Rodriguez MD Unavailable         Unavailable

 

                    TROY Rodriguez MD Unavailable         Unavailable

 

                    TROY Rodriguez MD Unavailable         Unavailable

 

                    TROY Rodriguez MD Unavailable         Unavailable

 

                    TROY Rodriguez MD Unavailable         Unavailable

 

                    TROY Rodriguez MD Unavailable         Unavailable

 

                    TROY Rodriguez MD Unavailable         Unavailable

 

                    TROY Rodriguez MD Unavailable         Unavailable

 

                    TROY Rodriguez MD Unavailable         Unavailable

 

                    TROY Rodriguez MD Unavailable         Unavailable

 

                    TROY Rodriguez MD Unavailable         Unavailable

 

                    TROY Rodriguez MD Unavailable         Unavailable

 

                    TROY Rodriguez MD Unavailable         Unavailable

 

                    TROY Rodriguez MD Unavailable         Unavailable

 

                    TROY Rodriguez MD Unavailable         Unavailable

 

                    TROY Rodriguez MD Unavailable         Unavailable

 

                    TROY Rodriguez MD Unavailable         Unavailable

 

                    TROY Rodriguez MD Unavailable         Unavailable

 

                    TROY Rodriguez MD Unavailable         Unavailable

 

                    TROY Rodriguez MD Unavailable         Unavailable

 

                    TROY Rodriguez MD Unavailable         Unavailable

 

                    TROY Rodriguez MD Unavailable         Unavailable

 

                    TROY Rodriguez MD Unavailable         Unavailable

 

                    TROY Rodriguez MD Unavailable         Unavailable

 

                    TROY Rodriguez MD Unavailable         Unavailable

 

                    TROY Rodriguez MD Unavailable         Unavailable

 

                    TROY Rodriguez MD Unavailable         Unavailable

 

                    TROY Rodriguez MD Unavailable         Unavailable

 

                    TROY Rodriguez MD Unavailable         Unavailable

 

                    TROY Rodriguez MD Unavailable         Unavailable

 

                    TROY Rodriguez MD Unavailable         Unavailable

 

                    TROY Rodriguez MD Unavailable         Unavailable

 

                    TROY Rodriguez MD Unavailable         Unavailable

 

                    TROY Rodriguez MD Unavailable         Unavailable

 

                    TROY Rodriguez MD Unavailable         Unavailable

 

                    TROY Rodriguez MD Unavailable         Unavailable

 

                    TROY Rodriguez MD Unavailable         Unavailable

 

                    TROY Rodriguez MD Unavailable         Unavailable

 

                    TROY Rodriguez MD Unavailable         Unavailable

 

                    TROY Rodriguez MD Unavailable         Unavailable

 

                    TROY Rodriguez MD Unavailable         Unavailable

 

                    TROY Rodriguez MD Unavailable         Unavailable

 

                    TROY Rodriguez MD Unavailable         Unavailable

 

                    TROY Rodriguez MD Unavailable         Unavailable

 

                    TROY Rodriguez MD Unavailable         Unavailable

 

                    TROY Rodriguez MD Unavailable         Unavailable

 

                    TROY Rodriguez MD Unavailable         Unavailable

 

                    TROY Rodriguez MD Unavailable         Unavailable

 

                    TROY Rodriguez MD Unavailable         Unavailable

 

                    TROY Rodriguez MD Unavailable         Unavailable

 

                    TROY Rodriguez MD Unavailable         Unavailable

 

                    Michael, O Samah MD Unavailable         Unavailable

 

                    Michael, O Samah MD Unavailable         Unavailable

 

                    Michael, O Samah MD Unavailable         Unavailable

 

                    Michael, O Samah MD Unavailable         Unavailable

 

                    Michael, O Samah MD Unavailable         Unavailable

 

                    Michael, O Samah MD Unavailable         Unavailable

 

                    Michael, O Samah MD Unavailable         Unavailable

 

                    Michael, O Samah MD Unavailable         Unavailable

 

                    Michael, O Samah MD Unavailable         Unavailable

 

                    Michael, O Samah MD Unavailable         Unavailable

 

                    Michael, O Samah MD Unavailable         Unavailable

 

                    Michael, O Samah MD Unavailable         Unavailable

 

                    Michael, O Samah MD Unavailable         Unavailable



                                  



Re-disclosure Warning

          The records that you are about to access may contain information from 
federally-assisted alcohol or drug abuse programs. If such information is 
present, then the following federally mandated warning applies: This information
has been disclosed to you from records protected by federal confidentiality 
rules (42 CFR part 2). The federal rules prohibit you from making any further 
disclosure of this information unless further disclosure is expressly permitted 
by the written consent of the person to whom it pertains or as otherwise 
permitted by 42 CFR part 2. A general authorization for the release of medical 
or other information is NOT sufficient for this purpose. The Federal rules 
restrict any use of the information to criminally investigate or prosecute any 
alcohol or drug abuse patient.The records that you are about to access may 
contain highly sensitive health information, the redisclosure of which is 
protected by Article 27-F of the [facility] Public Health law. If you 
continue you may have access to information: Regarding HIV / AIDS; Provided by 
facilities licensed or operated by the [facility] Office of Mental Health; 
or Provided by the [facility] Office for People With Developmental 
Disabilities. If such information is present, then the following [place] 
mandated warning applies: This information has been disclosed to you from 
confidential records which are protected by state law. State law prohibits you 
from making any further disclosure of this information without the specific 
written consent of the person to whom it pertains, or as otherwise permitted by 
law. Any unauthorized further disclosure in violation of state law may result in
a fine or detention sentence or both. A general authorization for the release of 
medical or other information is NOT sufficient authorization for further disc
losure.                                                                         
    



Allergies and Adverse Reactions

          



           Type       Description Substance  Reaction   Status     Data Source(s

)

 

           Drug allergy Drug allergy Penicillins                       [facility]

[facility]

 

           Allergy to substance Allergy to substance Penicillin antibiotic produ

ct                       

[facility] ([facility])



                                                                                
                 



Encounters

          



           Encounter  Providers  Location   Date       Indications Data Source(s

)

 

           Outpatient                       2021 12:00:00 AM EDT [facility]

 

                                        consult 

 

                Unlisted evaluation and management service Performer: Kathy JACK

handler                 

2021 09:57:00 PM EDT - 2021 05:10:00 PM EDT                         

  [facility] ([facility])

 

                Unlisted evaluation and management service Performer: Kathy SANTIAGO

handler                 

2021 08:36:00 PM EDT                           [facility] ([facility])

 

                Outpatient      Attender: Scout Rodriguez MD Main office - [facility] 07/15/2021 

09:30:00 AM EDT                                     [facility], )

 

                                        PIO VargasBC: [address], [place] [address], Ph. [phone]                  Attender: Radha Carrasco [facility]       2021 12:00:00 AM EDT                     [place] ([facility])

 

                                        MIREYA Vargas: [address]

[address], [place] [address], Ph. [phone]                  Attender: Radha Carrasco [facility]       2021 12:00:00 AM EDT                     [place] ([facility])

 

                                        MIREYA Vargas: [address]

[address], [place] [address], Ph. [phone]                  Attender: Radha Carrasco [facility]       2021 12:00:00 AM EDT                     [place] ([facility])

 

                Unlisted evaluation and management service                      

           2021 04:30:00 PM EST - 

2021 10:53:00 PM EST                           [facility] ([facility])

 

           Outpatient Attender: Sammie Markham   2020 02:40:00 PM EST

            [facility] 

([facility])

 

                          Inpatient                 Attender: Angelica PENALOZAtte

nder: ANGELICA JAMES MDAdmitter: ANGELICA JAMES MD                  [facility]  2020 03:42:00 PM EST - 2021 

10:45:00 AM EST 

PSYCHOACTIVE SUBSTANCE DEPENDENCE       [facility]

 

                                        PSYCHOACTIVE SUBSTANCE DEPENDENCE 

 

                                        Patient discharged. 

 

                Unlisted evaluation and management service Performer: Kathy SANTIAGO

handler                 

2020 08:54:00 PM EST - 2020 07:20:00 PM EST                         

  [facility] ([facility] 

[facility])



                                                                                
                                                                                
                          



Medications

          



          Medication Brand Name Start Date Product Form Dose      Route     Admi

nistrative 

Instructions Pharmacy Instructions Status     Indications Reaction   Description

 Data 

Source(s)

 

                          Buprenorphine 12 MG / Naloxone 3 MG Oral Strip Bupreno

rphine 

Hydrochloride/Naloxone Hydrochloride 2020 12:00:00 AM EST                 

          SUBLINGUAL    

                      active                                      [facility] ([facility])

 

                    Clonidine Hydrochloride 0.1 MG Oral Tablet Clonidine HCL    

   2020 12:00:00 AM 

EST                                       active                      [facility] ([facility])

 

                    Hydroxyzine Hydrochloride 25 MG Oral Tablet Hydroxyzine HCL 

    2020 12:00:00 

AM EST                                    active                      [facility] ([facility])

 

                                        Buprenorphine 2 MG / Naloxone 0.5 MG Ora

l Strip buprenorphine 2 mg-naloxone 0.5 

mg sublingual film PLACE ONE FILM UNDER THE TONGUE TWICE A DAY  MAXIMUM DAILY 
DOSE   2 FILMS                          buprenorphine 2 mg-naloxone 0.5 mg subli

ngual film PLACE ONE FILM

UNDER THE TONGUE TWICE A DAY  MAXIMUM DAILY DOSE   2 FILMS                      

                                       completed

                                                buprenorphine 2 MG / naloxone 0.

5 MG Sublingual Film [facility])

 

                                        Nicotine 4 MG Chewing Gum nicotine (eladio

crilex) 4 mg gum CHEW 1 PIECE OF GUM 

EVERY HOUR AS NEEDED FOR NICOTINE CRAVINGS nicotine (polacrilex) 4 mg gum CHEW 1

PIECE OF GUM EVERY HOUR AS NEEDED FOR NICOTINE CRAVINGS                         

                        completed         

                          nicotine 4 MG Chewing Gum [place] ([facility])

 

                                        Buprenorphine 2 MG / Naloxone 0.5 MG Ora

l Strip buprenorphine 2 mg-naloxone 0.5 

mg sublingual film PLACE ONE FILM UNDER THE TONGUE TWICE A DAY  MAXIMUM DAILY 
DOSE   2 FILMS                          buprenorphine 2 mg-naloxone 0.5 mg subli

ngual film PLACE ONE FILM

UNDER THE TONGUE TWICE A DAY  MAXIMUM DAILY DOSE   2 FILMS                      

                                       completed

                                                buprenorphine 2 MG / naloxone 0.

5 MG Sublingual Film [facility] ([facility])

 

                                        quetiapine 50 MG Oral Tablet quetiapine 

50 mg tablet TAKE ONE TABLET BY MOUTH AT

BEDTIME quetiapine 50 mg tablet TAKE ONE TABLET BY MOUTH AT BEDTIME             

                                    

completed                                       quetiapine 50 MG Oral Tablet ATH

[place] ([facility])

 

                                        Ibuprofen 800 MG Oral Tablet ibuprofen 8

00 mg tablet TAKE ONE TABLET BY MOUTH 

EVERY 6 HOURS AS NEEDED FOR PAIN        ibuprofen 800 mg tablet TAKE ONE TABLET 

BY 

MOUTH EVERY 6 HOURS AS NEEDED FOR PAIN                                          

 completed               ibuprofen 800 

MG Oral Tablet                          [facility] ([facility])

 

                                        Levofloxacin 250 MG Oral Tablet levoflox

acin 250 mg tablet TAKE ONE TABLET BY 

MOUTH DAILY levofloxacin 250 mg tablet TAKE ONE TABLET BY MOUTH DAILY           

                                        

             completed                              levofloxacin 250 MG Oral Tab

let [facility] ([facility])

 

                                        Cephalexin 500 MG Oral Capsule cephalexi

n 500 mg capsule TAKE ONE CAPSULE BY 

MOUTH THREE TIMES A DAY FOR 10 DAYS     cephalexin 500 mg capsule TAKE ONE CAPSU

LE 

BY MOUTH THREE TIMES A DAY FOR 10 DAYS                                          

 completed               cephalexin 500 

MG Oral Capsule                         [facility] ([facility])

 

                                        Buprenorphine 4 MG / Naloxone 1 MG Oral 

Strip buprenorphine 4 mg-naloxone 1 mg 

sublingual film PLACE ONE FILM UNDER THE TONGUE TWICE A DAY  MAXIMUM DAILY DOSE 
 2 FILMS                                buprenorphine 4 mg-naloxone 1 mg subling

ual film PLACE ONE FILM UNDER 

THE TONGUE TWICE A DAY  MAXIMUM DAILY DOSE   2 FILMS                            

               completed               

buprenorphine 4 MG / naloxone 1 MG Sublingual Film [place] ([facility])

 

        Tab-A-Violeta 400 mcg tablet TAKE ONE TABLET BY MOUTH EVERY DAY [id number]     

                                             

completed                                       Tab-A-Violeta 400 mcg tablet [place]

 ([facility])

 

                                        Cephalexin 500 MG Oral Capsule cephalexi

n 500 mg capsule TAKE ONE CAPSULE BY 

MOUTH THREE TIMES A DAY FOR 10 DAYS     cephalexin 500 mg capsule TAKE ONE CAPSU

LE 

BY MOUTH THREE TIMES A DAY FOR 10 DAYS                                          

 completed               cephalexin 500 

MG Oral Capsule                         [facility] ([facility])

 

                                        Clotrimazole 10 MG/ML Topical Cream clot

rimazole 1 % topical cream APPLY 

TOPICALLY TO FEET TWO TIMES A DAY       clotrimazole 1 % topical cream APPLY TOP

ICALLY

TO FEET TWO TIMES A DAY                                           completed     

          clotrimazole 10 MG/ML Topical 

Cream                                   [facility] ([facility])

 

                                        Buprenorphine 4 MG / Naloxone 1 MG Oral 

Strip buprenorphine 4 mg-naloxone 1 mg 

sublingual film PLACE ONE FILM UNDER THE TONGUE TWICE A DAY  MAXIMUM DAILY DOSE 
 2 FILMS                                buprenorphine 4 mg-naloxone 1 mg subling

ual film PLACE ONE FILM UNDER 

THE TONGUE TWICE A DAY  MAXIMUM DAILY DOSE   2 FILMS                            

               completed               

buprenorphine 4 MG / naloxone 1 MG Sublingual Film [place] ([facility])

 

                                        Doxycycline Monohydrate 100 MG Oral Caps

ule doxycycline monohydrate 100 mg 

capsule TAKE ONE CAPSULE BY MOUTH EVERY 12 HOURS doxycycline monohydrate 100 mg 

capsule TAKE ONE CAPSULE BY MOUTH EVERY 12 HOURS                                

           completed               

doxycycline monohydrate 100 MG Oral Capsule [place] ([facility])

 

        Tab-A-Violeta 400 mcg tablet TAKE ONE TABLET BY MOUTH EVERY DAY [id number]     

                                             

completed                                       Tab-A-Violeta 400 mcg tablet [place]

 ([facility])

 

                                        buspirone hydrochloride 5 MG Oral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  

 

                                        Reference Range:  <10 ng/mL:    Deficien

t  10-30 ng/mL:  Insufficient   

ng/mL: Sufficient  >100 ng/mL:   Toxicity possible 









                    ID                  Date                Data Source

 

                    [id number] 2020 10:54:00 AM [facility]      Value     Range     Interpretation Code Description Data [facility]

rce(s) Supporting 

Document(s)

 

           Syphilis Serology            Nonreactive Normal (applies to non-numer

ic results)            [facility]                        









                    ID                  Date                Data Source

 

                    [id number] 2020 10:01:00 AM EST [facility]









          Name      Value     Range     Interpretation Code Description Data [facility]

rce(s) Supporting 

Document(s)

 

           Sodium     139 mmol/L 137-145    Normal (applies to non-numeric resul

ts)            [facility]                         

 

           Potassium             3.5-5.1    Normal (applies to non-numeric resul

ts)            [facility]                                 

 

           Chloride   106 mmol/L      Normal (applies to non-numeric resul

ts)            [facility]                         

 

           Carbon Dioxide CO2            22.0-33.0  Normal (applies to non-numer

ic results)            [facility]                         

 

           Anion Gap             4.0-11.0   Normal (applies to non-numeric resul

ts)            [facility]                                 

 

           BUN        17 mg/dL   9-20       Normal (applies to non-numeric resul

ts)            [facility]                                 

 

           Creatinine            0.80-1.50  Normal (applies to non-numeric resul

ts)            [facility]                                 

 

           GFR        88 mL/min  >60        Normal (applies to non-numeric resul

ts)            [facility]                                 

 

                                        Result based on MDRD formula. 

 

          Glucose Level 110 mg/dL 74-99     Above high normal           [facility]  

 

                                        The reference range is only applicable w

hen fasting. 

 

           Calcium-Uncorrected            8.4-10.2   Normal (applies to non-nume

nikki results)            [facility]                         

 

           Corrected Calcium            8.4-10.2   Normal (applies to non-numeri

c results)            [facility]                         

 

           Bilirubin,Total            0.2-1.3    Normal (applies to non-numeric 

results)            [facility]                         

 

           Bilirubin,Direct            0.0-0.3    Normal (applies to non-numeric

 results)            [facility]                         

 

          SGOT(AST) 79 U/L    17-59     Above high normal           [facility]  

 

           SGPT(ALT)  51 U/L     21-72      Normal (applies to non-numeric resul

ts)            [facility]                                 

 

           Alkaline Phosphatase 71 U/L          Normal (applies to non-num

brigette results)            

[facility]                  

 

                                        Pregnancy can increase Alkaline Phosp le

vels up to 2 times the normal adult 

value.      Normal values for children and adolescents are 2 to 3 times the 
normal adult value. 

 

          CPK       695 U/L       Above high normal           [facility]  

 

           Total Protein            6.3-8.2    Normal (applies to non-numeric re

sults)            [facility]                                

 

           Albumin               3.5-5.0    Normal (applies to non-numeric resul

ts)            [facility]                                 

 

                Thyroid Stimulate Hormone TSH                 0.358-3.740     No

rmal (applies to non-numeric 

results)                                [facility]  









                    ID                  Date                Data Source

 

                    [id number] 2020 10:01:00 AM EST [facility]









          Name      Value     Range     Interpretation Code Description Data [facility]

rce(s) Supporting 

Document(s)

 

           Magnesium             1.80-2.40  Normal (applies to non-numeric resul

ts)            [facility]                                 









                    ID                  Date                Data Source

 

                    [id number] 2020 10:01:00 AM [facility]









          Name      Value     Range     Interpretation Code Description Data [facility]

rce(s) Supporting 

Document(s)

 

           C-Reactive Protein,Wide Range            <3.00      Above high normal

            [facility]                                 









                    ID                  Date                Data Source

 

                    [id number] 2020 09:31:00 AM EST [facility]









          Name      Value     Range     Interpretation Code Description Data [facility]

rce(s) Supporting 

Document(s)

 

           White Blood Count            4.8-10.8   Normal (applies to non-numeri

c results)            [facility]                         

 

           Red Blood Count            4.35-6.08  Normal (applies to non-numeric 

results)            [facility]                         

 

           Hemoglobin            13.0-17.5  Normal (applies to non-numeric resul

ts)            [facility]                                 

 

           Hematocrit            37.7-51.0  Normal (applies to non-numeric resul

ts)            [facility]                                 

 

           Mean Corpuscular Volume            80-94      Normal (applies to non-

numeric results)            [facility]                        

 

             Mean Corpuscular Hemoglobin              27.0-33.0    Normal (appli

es to non-numeric results) 

                          [facility]    

 

           Mean Corpuscular HGB Conc            32.0-36.0  Normal (applies to no

n-numeric results)            

[facility]                  

 

             Red Cell Distribution Width              11.5-14.5    Normal (appli

es to non-numeric results) 

                          [facility]    

 

           Platelet Count 207 X10 3/uL 130-450    Normal (applies to non-numeric

 results)            

[facility]                  

 

           Mean Platelet Volume            9.6-13.1   Normal (applies to non-num

brigette results)            [facility]                        

 

           Imm Grans% (AUTO) 0 %        0-2        Normal (applies to non-numeri

c results)            [facility]                         

 

           Neutrophils % (AUTO) 52 %       40-75      Normal (applies to non-num

brigette results)            [facility]                        

 

           Lymphocytes % (AUTO) 29 %       21-46      Normal (applies to non-num

brigette results)            [facility]                        

 

           Monocytes % (AUTO) 11 %       5-12       Normal (applies to non-numer

ic results)            [facility]                         

 

          Eosinophils % (AUTO) 7 %       1-5       Above high normal           [facility]  

 

           Basophils % (AUTO) 1 %        0-1        Normal (applies to non-numer

ic results)            [facility]                         

 

           Imm Grans# (AUTO)            0.0-0.5    Normal (applies to non-numeri

c results)            [facility]                         

 

           Neutrophils # (AUTO)            1.5-8.1    Normal (applies to non-num

brigette results)            [facility]                         

 

           Lymphocytes # (AUTO)            1.0-3.1    Normal (applies to non-num

brigette results)            [facility]                         

 

           Monocytes # (AUTO)            0.2-1.3    Normal (applies to non-numer

ic results)            [facility]                         

 

           Eosinophils# (AUTO)            0.0-0.5    Normal (applies to non-nume

nikki results)            [facility]                         

 

           Basophils # (AUTO)            0.0-0.1    Normal (applies to non-numer

ic results)            [facility]                         









                    ID                  Date                Data Source

 

                    [id number] 2020 02:28:00 PM EST [facility]









          Name      Value     Range     Interpretation Code Description Data [facility]

rce(s) Supporting 

Document(s)

 

             Cannabinoids Confirm,Ur result              .            Very abnor

mal (applies to non-numeric units  

                          [facility]    

 

                                        Carboxy THC GC/MS Conf      92          

       ng/mL Cutoff=10        01  

Performed at:  [address]  [id number]  
: Araceli Reyes MD, Phone:  [phone] 









                    ID                  Date                Data Source

 

                    [id number] 2020 09:06:00 PM EST [facility]









          Name      Value     Range     Interpretation Code Description Data [facility]

rce(s) Supporting 

Document(s)

 

           Opiate Screen,Urine            Negative   Normal (applies to non-nume

nikki results)            [facility]                         

 

          Amphetamine Screen,Urine           Negative  Velazquez                  [facility]  

 

                Benzodiazepines Scrn,Ur result                 Negative        N

ormal (applies to non-numeric 

results)                                [facility]  

 

           Cocaine Screen,Urine            Negative   Normal (applies to non-num

brigette results)            [facility]                        

 

           Methadone Screen,Urine            Negative   Normal (applies to non-n

umeric results)            

[facility]                  

 

          Cannabinoid Screen, Ur           Negative  Ha                  [facility]  

 

                                        Therapeutic Drug Ranges for Emergency an

d Rehabilitation                        

      Threshold Levels (ng/mL)  Cocaine                           300  Opiates  
                         300  Cannabinoids                       50  
Barbiturates                      200  Benzodiazepine                    200  
Methadone                         300  Amphetamines                     1000    
All positive findings are presumptive and unconfirmed.    Confirmation of 
positive results are performed only at request of provider.  Unconfirmed results
 must not be used for non-medical purposes (i.e. pre-employment and legal 
purposes) 









                    ID                  Date                Data Source

 

                    [id number]   2020 06:32:00 PM EST [facility]









          Name      Value     Range     Interpretation Code Description Data [facility]

rce(s) Supporting 

Document(s)

 

                                        Respiratory specimen severe acute respir

atory syndrome coronavirus 2 

(SARS-CoV-2) RNA                                             [facility]      

 

                                        This lab was ordered by [facility]

oj and reported by [facility]. 









                    ID                  Date                Data Source

 

                    [id number] 2020 06:18:00 PM EST [facility]









          Name      Value     Range     Interpretation Code Description Data [facility]

rce(s) Supporting 

Document(s)

 

           SARS-CoV-2 RNA            Negative   Normal (applies to non-numeric r

esults)            [facility]                         

 

                                        Negative results should be treated as pr

esumptive and, if inconsistent with 

clinical signs and symptoms or necessary for patient management, should be 
tested with different authorized or cleared molecular tests. Negative results do
 not preclude SARS-CoV-2 infection and should not be used as the sole basis for 
patient management decisions. Negative results should be considered in the 
context of a patients recent exposures, history and the presence of clinical 
signs and symptoms consistent with COVID-19.     This test has not been FDA 
cleared or approved; this test has been authorized by FDA under an Emergency Use
 Authorization for use by laboratories certified under the Clinical Laboratory 
Improvement Amendments of 1988 (CLIA), 42 U.S.C. 263a, to perform moderate 
complexity/high complexity tests and at the Point of Care (POC), i.e., in 
patient care settings operating under a CLIA Certificate of Waiver, Certificate 
of Compliance, or Certificate of Accreditation.    Factsheets for healthcare 
providers:  https://www.fda.gov/media/282105/download  Factsheets for patients: 
 https://www.fda.gov/media/104918/download    The ID NOW Instrument is a rapid 
molecular in vitro diagnostic test utilizing an isothermal nucleic acid 
amplification technology intended for the qualitative detection of nucleic acid 
from the SARS-CoV-2 viral RNA.    THIS IS A STATE REPORTABLE COMMUNICABLE 
DISEASE.    Manual entry verified  by Cherie Hood 207 









                    ID                  Date                Data Source

 

                    [id number] 2020 11:55:00 PM EST [facility]









          Name      Value     Range     Interpretation Code Description Data [facility]

rce(s) Supporting 

Document(s)

 

           Color,Urine            Yellow     Normal (applies to non-numeric resu

lts)            [facility]                                 

 

           Clarity,Urine            Clear      Normal (applies to non-numeric re

sults)            [facility]                                 

 

           Specific Gravity,Urine            1.001-1.030 Normal (applies to non-

numeric results)            

[facility]                  

 

           PH,Urine              5.0-8.0    Normal (applies to non-numeric resul

ts)            [facility]                                 

 

           Protein,Urine            Negative   Normal (applies to non-numeric re

sults)            [facility]                         

 

           Glucose,Urine (UA)            Negative   Normal (applies to non-numer

ic results)            [facility]                         

 

          Ketones,Urine           Negative  [facility]

ospital  

 

           Blood,Urine            Negative   Normal (applies to non-numeric resu

lts)            [facility]                                 

 

           Bilirubin,Urine            Negative   Normal (applies to non-numeric 

results)            [facility]                         

 

           Urobilinogen,Urine            Norm 0.2-1 Normal (applies to non-numer

ic results)            [facility]                        

 

           Leukocyte Esterase,Urine            Negative   Normal (applies to non

-numeric results)            

[facility]                  

 

           Nitrite,Urine            Negative   Normal (applies to non-numeric re

sults)            [facility]                         







                                        Procedure

 

                                          



                                                                                
                                                                                
                                                                                
                                                                              



Social History

          No Information                                                        
                                



Vital Signs

          



                    ID                  Date                Data Source

 

                    UNK                                      









           Name       Value      Range      Interpretation Code Description Data

 Source(s)

 

           Systolic blood pressure 104.0 MM[HG]                       104.0 MM[H

G] NETSMART ([facility])

 

           Diastolic blood pressure 61.0 MM[HG]                       61.0 MM[HG

] NETSMART ([facility])

 

           Heart rate 94.0 /MIN                        94.0 /MIN  [facility] ([facility]

 

           Respiratory rate 16.0 /MIN                        16.0 /MIN  NETSMART

 ([facility])

 

           Systolic blood pressure 117.0 MM[HG]                       117.0 MM[H

G] NETSMART ([facility])

 

           Body temperature 97.7 [DEGF]                       97.7 [DEGF] NETSMA

RT ([facility])

 

           Diastolic blood pressure 62.0 MM[HG]                       62.0 MM[HG

] NETSMART ([facility])

 

           Body temperature 36.5 LACEY                         36.5 LACEY   NETSMART

 ([facility])

 

           Heart rate 64.0 /MIN                        64.0 /MIN  [facility] ([facility])

 

           Body temperature 97.8 [DEGF]                       97.8 [DEGF] NETSMA

RT ([facility])

 

           Body temperature 36.6 LACEY                         36.6 LACEY   NETSMART

 ([facility])

 

           Heart rate 90.0 /MIN                        90.0 /MIN  [facility] ([facility])

 

           Respiratory rate 16.0 /MIN                        16.0 /MIN  NETSMART

 ([facility])

 

           Systolic blood pressure 110.0 MM[HG]                       110.0 MM[H

G] NETSMART ([facility])

 

           Diastolic blood pressure 66.0 MM[HG]                       66.0 MM[HG

] NETSMART ([facility])

 

           Heart rate 64.0 /MIN                        64.0 /MIN  [facility] ([facility])

 

           Respiratory rate 16.0 /MIN                        16.0 /MIN  NETSMART

 ([facility])

 

           Body temperature 97.3 [DEGF]                       97.3 [DEGF] NETSMA

RT ([facility])

 

           Body temperature 36.3 LACEY                         36.3 LACEY   NETSMART

 ([facility])

 

           Systolic blood pressure 118.0 MM[HG]                       118.0 MM[H

G] NETSMART ([facility])

 

           Diastolic blood pressure 69.0 MM[HG]                       69.0 MM[HG

] NETSMART ([facility])

 

           Heart rate 86.0 /MIN                        86.0 /MIN  [facility] ([facility])

 

           Systolic blood pressure 110.0 MM[HG]                       110.0 MM[H

G] NETSMART ([facility])

 

           Diastolic blood pressure 69.0 MM[HG]                       69.0 MM[HG

] NETSMART ([facility])

 

           Respiratory rate 17.0 /MIN                        17.0 /MIN  NETSMART

 ([facility])

 

           Respiratory rate 16.0 /MIN                        16.0 /MIN  NETSMART

 ([facility])

 

           Systolic blood pressure 120.0 MM[HG]                       120.0 MM[H

G] NETSMART ([facility])

 

           Diastolic blood pressure 64.0 MM[HG]                       64.0 MM[HG

] NETSMART ([facility])

 

           Heart rate 71.0 /MIN                        71.0 /MIN  NETSMART ([facility])

 

           Body temperature 97.5 [DEGF]                       97.5 [DEGF] NETSMA

RT ([facility])

 

           Body temperature 36.4 LACEY                         36.4 LACEY   NETSMART

 ([facility])

 

           Body temperature 37.1 LACEY                         37.1 LACEY   NETSMART

 ([facility])

 

           Heart rate 74.0 /MIN                        74.0 /MIN  NETSMART ([facility])

 

           Diastolic blood pressure 71.0 MM[HG]                       71.0 MM[HG

] NETSMART ([facility])

 

           Systolic blood pressure 131.0 MM[HG]                       131.0 MM[H

G] NETSMART ([facility])

 

           Body weight 63.6 KG                          63.6 KG    NETSMART ([facility])

 

           Body mass index (BMI) [Ratio] 19.0                             19.0  

     NETSMART ([facility])

 

           Body height 182.9 cm                         182.9 cm   NETSMART ([facility])

 

           Body temperature 98.7 [DEGF]                       98.7 [DEGF] NETSMA

RT ([facility])

 

           Respiratory rate 16.0 /MIN                        16.0 /MIN  NETSMART

 ([facility])

 

           Oxygen saturation in Arterial blood by Pulse oximetry 100.0 %        

                  100.0 %    

NETSMART ([facility])

 

           Respiratory rate 12 /min                          12 /min    MEDENT (

[facility], )

 

           Body height 72 [in_i]                        72 [in_i]  MEDENT ([facility] )

 

                                        6'0" 

 

           Body weight 145.00 [lb_av]                       145.00 [lb_av] MEDEN

T ([facility], 

)

 

           Body mass index (BMI) [Ratio] 19.7 kg/m2                       19.7 k

g/m2 MED[facility] ([facility] 

[facility], )

 

           Ideal body weight 178 [lb_av]                       178 [lb_av] DESI

T ([facility], 

)

 

           Body height 72 [in_i]                        72 [in_i]  JOHNNIE ([facility])

 

           Diastolic blood pressure 62 mm[Hg]                        62 mm[Hg]  

JOHNNIE ([facility])

 

           Body height 72 [in_i]                        72 [in_i]  JOHNNIE ([facility])

 

           Body mass index (BMI) [Ratio] 19.4 kg/m2                       19.4 k

g/m2 JOHNNIE ([facility])

 

           Systolic blood pressure 98 mm[Hg]                        98 mm[Hg]  A

[place] ([facility])

 

           Body weight 2290 [oz_av]                       2290 [oz_av] JOHNNIE ([facility])

 

           Diastolic blood pressure 62 mm[Hg]                        62 mm[Hg]  

JOHNNIE ([facility])

 

           Body height 72 [in_i]                        72 [in_i]  JOHNNIE ([facility])

 

           Body mass index (BMI) [Ratio] 19.4 kg/m2                       19.4 k

g/m2 JOHNNIE ([facility])

 

           Systolic blood pressure 98 mm[Hg]                        98 mm[Hg]  A

[place] ([facility])

 

           Body weight 2290 [oz_av]                       2290 [oz_av] JOHNNIE ([facility])

 

           Heart rate 72.0 /MIN                        72.0 /MIN  NETSMART ([facility])

 

           Body temperature 36.4 LACEY                         36.4 LACEY   NETSMART

 ([facility])

 

           Respiratory rate 14.0 /MIN                        14.0 /MIN  NETSMART

 ([facility])

 

           Systolic blood pressure 122.0 MM[HG]                       122.0 MM[H

G] NETSMART ([facility])

 

           Diastolic blood pressure 68.0 MM[HG]                       68.0 MM[HG

] NETSMART ([facility])

 

           Body temperature 97.6 [DEGF]                       97.6 [DEGF] NETSMA

RT ([facility])

 

           Respiratory rate 17.0 /MIN                        17.0 /MIN  NETSMART

 ([facility])

 

           Systolic blood pressure 114.0 MM[HG]                       114.0 MM[H

G] NETSMART ([facility])

 

           Diastolic blood pressure 74.0 MM[HG]                       74.0 MM[HG

] NETSMART ([facility])

 

           Body temperature 36.6 LACEY                         36.6 LACEY   NETSMART

 ([facility])

 

           Heart rate 92.0 /MIN                        92.0 /MIN  [facility]SMART ([facility]

 

           Body temperature 97.8 [DEGF]                       97.8 [DEGF] NETSMA

RT ([facility])

 

           Body temperature 98.2 [DEGF]                       98.2 [DEGF] NETSMA

RT ([facility])

 

           Body temperature 36.8 LACEY                         36.8 LACEY   NETSMART

 ([facility])

 

           Respiratory rate 16.0 /MIN                        16.0 /MIN  NETSMART

 ([facility])

 

           Systolic blood pressure 119.0 MM[HG]                       119.0 MM[H

G] NETSMART ([facility])

 

           Diastolic blood pressure 68.0 MM[HG]                       68.0 MM[HG

] NETSMART ([facility])

 

           Heart rate 79.0 /MIN                        79.0 /MIN  [facility]SMART ([facility]

 

           Body temperature 97.5 [DEGF]                       97.5 [DEGF] NETSMA

RT ([facility])

 

           Body temperature 36.4 LACEY                         36.4 LACEY   NETSMART

 ([facility])

 

           Heart rate 64.0 /MIN                        64.0 /MIN  [facility]SMART ([facility]

 

           Respiratory rate 16.0 /MIN                        16.0 /MIN  NETSMART

 ([facility])

 

           Systolic blood pressure 107.0 MM[HG]                       107.0 MM[H

G] NETSMART ([facility])

 

           Diastolic blood pressure 60.0 MM[HG]                       60.0 MM[HG

] NETSMART ([facility])

 

           Respiratory rate 16.0 /MIN                        16.0 /MIN  NETSMART

 ([facility])

 

           Systolic blood pressure 113.0 MM[HG]                       113.0 MM[H

G] NETSMART ([facility])

 

           Diastolic blood pressure 63.0 MM[HG]                       63.0 MM[HG

] NETSMART ([facility])

 

           Heart rate 53.0 /MIN                        53.0 /MIN  [facility]SMART ([facility]

 

           Body temperature 37.1 LACEY                         37.1 LACEY   NETSMART

 ([facility])

 

           Body temperature 98.7 [DEGF]                       98.7 [DEGF] NETSMA

RT ([facility])

 

           Heart rate 74.0 /MIN                        74.0 /MIN  [facility]SMART ([facility]

 

           Systolic blood pressure 103.0 MM[HG]                       103.0 MM[H

G] NETSMART ([facility])

 

           Diastolic blood pressure 62.0 MM[HG]                       62.0 MM[HG

] NETSMART ([facility])

 

           Respiratory rate 17.0 /MIN                        17.0 /MIN  NETSMART

 ([facility])

 

           Systolic blood pressure 103.0 MM[HG]                       103.0 MM[H

G] NETSMART ([facility])

 

           Body temperature 98.7 [DEGF]                       98.7 [DEGF] NETSMA

RT ([facility])

 

           Heart rate 74.0 /MIN                        74.0 /MIN  [facility]SMART ([facility]

 

           Body temperature 37.1 LACEY                         37.1 LACEY   NETSMART

 ([facility])

 

           Respiratory rate 17.0 /MIN                        17.0 /MIN  NETSMART

 ([facility])

 

           Diastolic blood pressure 62.0 MM[HG]                       62.0 MM[HG

] NETSMART ([facility])

 

           Heart rate 73.0 /MIN                        73.0 /MIN  [facility]SMART ([facility]

 

           Respiratory rate 16.0 /MIN                        16.0 /MIN  NETSMART

 ([facility])

 

           Systolic blood pressure 107.0 MM[HG]                       107.0 MM[H

G] NETSMART ([facility])

 

           Body temperature 97.7 [DEGF]                       97.7 [DEGF] NETSMA

RT ([facility])

 

           Diastolic blood pressure 61.0 MM[HG]                       61.0 MM[HG

] NETSMART ([facility])

 

           Body temperature 36.5 LACEY                         36.5 LACYE   NETSMART

 ([facility])

 

           Heart rate 16.0 /MIN                        16.0 /MIN  [facility]SMART ([facility]

 

           Body temperature 98.7 [DEGF]                       98.7 [DEGF] NETSMA

RT ([facility])

 

           Respiratory rate 16.0 /MIN                        16.0 /MIN  NETSMART

 ([facility])

 

           Systolic blood pressure 136.0 MM[HG]                       136.0 MM[H

G] NETSMART ([facility])

 

           Diastolic blood pressure 76.0 MM[HG]                       76.0 MM[HG

] NETSMART ([facility])

 

           Body temperature 37.1 LACEY                         37.1 LACEY   NETSMART

 ([facility])

 

           Body temperature 97.8 [DEGF]                       97.8 [DEGF] NETSMA

RT ([facility])

 

           Body temperature 36.6 LACEY                         36.6 LACEY   NETSMART

 ([facility])

 

           Heart rate 82.0 /MIN                        82.0 /MIN  [facility]SM[facility] ([facility])

 

           Respiratory rate 16.0 /MIN                        16.0 /MIN  NETSMART

 ([facility])

 

           Systolic blood pressure 112.0 MM[HG]                       112.0 MM[H

G] NETSMART ([facility])

 

           Diastolic blood pressure 70.0 MM[HG]                       70.0 MM[HG

] NETSMART ([facility])

 

           Body temperature 36.8 LACEY                         36.8 LACEY   NETSMART

 ([facility])

 

           Heart rate 87.0 /MIN                        87.0 /MIN  [facility]SMART ([facility]

 

           Respiratory rate 18.0 /MIN                        18.0 /MIN  NETSMART

 ([facility])

 

           Systolic blood pressure 115.0 MM[HG]                       115.0 MM[H

G] NETSMART ([facility])

 

           Diastolic blood pressure 75.0 MM[HG]                       75.0 MM[HG

] NETSMART ([facility])

 

           Body temperature 98.2 [DEGF]                       98.2 [DEGF] NETSMA

RT ([facility])

 

           Body temperature 36.6 LACEY                         36.6 LACEY   NETSMART

 ([facility])

 

           Body temperature 97.8 [DEGF]                       97.8 [DEGF] NETSMA

RT ([facility])

 

           Heart rate 91.0 /MIN                        91.0 /MIN  [facility]SM[facility] ([facility]

 

           Respiratory rate 16.0 /MIN                        16.0 /MIN  NETSMART

 ([facility])

 

           Systolic blood pressure 107.0 MM[HG]                       107.0 MM[H

G] NETSMART ([facility])

 

           Diastolic blood pressure 62.0 MM[HG]                       62.0 MM[HG

] NETSMART ([facility])

 

           Oxygen saturation in Arterial blood by Pulse oximetry 99.0 %         

                  99.0 %     NETSMART

 ([facility])









                    ID                  Date                Data Source

 

                    [id number]           2021 09:35:00 AM [facility]









           Name       Value      Range      Interpretation Code Description Data

 Source(s)

 

           Weight Measurement Method 1                                1         

 [facility]

 

           Weight (Calculated Kilograms) 59.42                            59.42 

     [facility]

 

           Weight     6                                    [facility]

 

           Temperature Source 7                                7          [facility]

 

           Temperature 97.6                             97.6       [facility]

 

           Respiratory Effort 1                                1          [facility]

 

           Respiratory Rate 16                               16         [facility]

 

           Pulse Assessment Method 4                                4          [facility]

 

           Pulse Rate 66                               66         [facility]

 

           Height (Calculated Centimeters) 180.34                           180.

34     [facility]

 

           Height     71                               71         [facility]

 

           Blood Pressure 130/55                           130/55     [facility]

 

           Body Mass Index (BMI) 18.2                             18.2       [facility]

 

           Weight Measurement Method 1                                1         

 [facility]

 

           Weight (Calculated Kilograms) 59.42                            59.42 

     [facility]

 

           Weight     2096       [facility]

 

           Temperature Source 7                                7          [facility]

 

           Temperature 97.6                             97.6       [facility]

 

           Respiratory Effort 1                                1          [facility]

 

           Respiratory Rate 16                               16         [facility]

 

           Pulse Assessment Method 4                                4          [facility]

 

           Pulse Rate 66                               66         [facility]

 

           Height (Calculated Centimeters) 180.34                           180.

34     [facility]

 

           Height     71                               71         [facility]

 

           Blood Pressure 130/55                           130/55     [facility]

 

           Body Mass Index (BMI) 18.2                             18.2       [facility]

 

           Weight Measurement Method 1                                1         

 [facility]

 

           Weight (Calculated Kilograms) 59.42                            59.42 

     [facility]

 

           Weight     2096       [facility]

 

           Temperature Source 7                                7          [facility]

 

           Temperature 97.6                             97.6       [facility]

 

           Respiratory Effort 1                                1          [facility]

 

           Respiratory Rate 16                               16         [facility]

 

           Pulse Assessment Method 4                                4          [facility]

 

           Pulse Rate 66                               66         [facility]

 

           Height (Calculated Centimeters) 180.34                           180.

34     [facility]

 

           Height     71                               71         [facility]

 

           Blood Pressure 130/55                           130/55     [facility]

 

           Body Mass Index (BMI) 18.2                             18.2       [facility]

 

           Weight Measurement Method 1                                1         

 [facility]

 

           Weight (Calculated Kilograms) 59.42                            59.42 

     [facility]

 

           Weight     2096       [facility]

 

           Temperature Source 7                                7          [facility]

 

           Temperature 96.7                             96.7       [facility]

 

           Respiratory Effort 1                                1          [facility]

 

           Respiratory Rate 16                               16         [facility]

 

           Pulse Assessment Method 4                                4          C

[facility]

 

           Pulse Rate 49                               49         [facility]

 

           Height (Calculated Centimeters) 180.34                           180.

34     [facility]

 

           Height     71                               71         [facility]

 

           Blood Pressure 96/56                            96/56      [facility]

 

           Body Mass Index (BMI) 18.2                             18.2       [facility]

 

           Weight Measurement Method 1                                1         

 [facility]

 

           Weight (Calculated Kilograms) 59.42                            59.42 

     [facility]

 

           Weight     6                             6       [facility]

 

           Temperature Source 7                                7          [facility]

 

           Temperature 96.7                             96.7       [facility]

 

           Respiratory Effort 1                                1          [facility]

 

           Respiratory Rate 15                               15         [facility]

 

           Pulse Assessment Method 3                                3          [facility]

 

           Pulse Rate 72                               72         [facility]

 

           Height (Calculated Centimeters) 180.34                           180.

34     [facility]

 

           Height     71                               71         [facility]

 

           Blood Pressure 110/68                           110/68     [facility]

 

           Body Mass Index (BMI) 18.2                             18.2       [facility]

 

           Weight Measurement Method 1                                1         

 [facility]

 

           Weight (Calculated Kilograms) 59.42                            59.42 

     [facility]

 

           Weight     2096                             6       [facility]

 

           Temperature Source 7                                7          [facility]

 

           Temperature 98.0                             98.0       [facility]

 

           Respiratory Effort 1                                1          [facility]

 

           Respiratory Rate 16                               16         [facility]

 

           Pulse Assessment Method 4                                4          C

[facility]

 

           Pulse Rate 76                               76         [facility]

 

           Height (Calculated Centimeters) 180.34                           180.

34     [facility]

 

           Height     71                               71         [facility]

 

           Blood Pressure 128/60                           128/60     [facility]

 

           Body Mass Index (BMI) 18.2                             18.2       [facility]



                                                                                
                            



Patient Treatment Plan of Care

          



             Planned Activity Planned Date Details      Description  Data Source

(s)

 

             Tab-A-Violeta 400 mcg tablet TAKE ONE TABLET BY MOUTH EVERY DAY       

                                 J[facility]NNIE ([facility])

 

             Sulfamethoxazole 800 MG / Trimethoprim 160 MG Oral Tablet          

                              [place] ([facility])

 

             quetiapine 50 MG Oral Tablet                                       

 [place] ([facility])

 

             Nicotine 4 MG Chewing Gum                                        AT

[facility] ([facility])

 

                                        Narcan 4 mg/actuation nasal spray SPRAY 

2 SPRAYS  4MG  IN EACH NOSTRIL AS 

DIRECTED FOR SUSPECTED OPOID OVERDOSE AND CALL 911                              

                   J[facility]NNIE ([facility])

 

             methylprednisolone 4 mg tablets in a dose pack USE AS DIRECTED     

                                   [facility] 

([facility])

 

             Levofloxacin 250 MG Oral Tablet                                    

    [facility] ([facility])

 

             Ibuprofen 800 MG Oral Tablet                                       

 [facility] ([facility])

 

             Hydroxyzine Hydrochloride 25 MG Oral Tablet                        

                [facility] ([facility])

 

             Doxycycline Monohydrate 100 MG Oral Capsule                        

                [facility] ([facility])

 

             Clotrimazole 10 MG/ML Topical Cream                                

        [facility] ([facility])

 

             Cholecalciferol 1000 UNT Oral Tablet                               

         [facility] ([facility])

 

             Cephalexin 500 MG Oral Capsule                                     

   [facility] ([facility])

 

             buspirone hydrochloride 5 MG Oral Tablet                           

             [facility] ([facility])

 

             Buprenorphine 8 MG / Naloxone 2 MG Oral Strip                      

                  [facility] ([facility])

 

             Buprenorphine 4 MG / Naloxone 1 MG Oral Strip                      

                  [facility] ([facility])

 

             Buprenorphine 2 MG / Naloxone 0.5 MG Oral Strip                    

                    [facility]IE ([facility])

 

             Tab-A-Violeta 400 mcg tablet TAKE ONE TABLET BY MOUTH EVERY DAY       

                                 [place] ([facility])

 

             quetiapine 50 MG Oral Tablet                                       

 [facility] ([facility])

 

             Nicotine 4 MG Chewing Gum                                        AT

[place] ([facility])

 

                                        Narcan 4 mg/actuation nasal spray SPRAY 

2 SPRAYS  4MG  IN EACH NOSTRIL AS 

DIRECTED FOR SUSPECTED OPOID OVERDOSE AND CALL 911                              

                   [facility] ([facility])

 

             methylprednisolone 4 mg tablets in a dose pack USE AS DIRECTED     

                                   JOHNNIE 

([facility])

 

             Ibuprofen 800 MG Oral Tablet                                       

 JOHNNIE ([facility])

 

             Hydroxyzine Hydrochloride 25 MG Oral Tablet                        

                JOHNNIE ([facility])

 

             Doxycycline Monohydrate 100 MG Oral Capsule                        

                JOHNNIE ([facility])

 

             Clotrimazole 10 MG/ML Topical Cream                                

        JOHNNIE ([facility])

 

             Cholecalciferol 1000 UNT Oral Tablet                               

         JOHNNIE ([facility])

 

             Cephalexin 500 MG Oral Capsule                                     

   JOHNNIE ([facility])

 

             buspirone hydrochloride 5 MG Oral Tablet                           

             JOHNNIE ([facility])

 

             Buprenorphine 8 MG / Naloxone 2 MG Oral Strip                      

                  JOHNNIE ([facility])

 

             Buprenorphine 4 MG / Naloxone 1 MG Oral Strip                      

                  JOHNNIE ([facility])

 

             Buprenorphine 2 MG / Naloxone 0.5 MG Oral Strip                    

                    JOHNNIE ([facility])

## 2021-11-16 NOTE — ECGEPIP
[facility] - ED

                                       

                                       Test Date:    2021-11-15

Pat Name:     JUWAN HARO     Department:   

Patient ID:   [id number]                 Room:         -

Gender:       Male                     Technician:   wade

:          1995               Requested By: Amy Srivastava 

Order Number: [id number]     Reading MD:   Ascencion Ballard

                                 Measurements

Intervals                              Axis          

Rate:         114                      P:            76

HI:           148                      QRS:          100

QRSD:         92                       T:            59

QT:           330                                    

QTc:          454                                    

                           Interpretive Statements

Sinus tachycardia

Biatrial enlargement

Rightward axis

Incomplete right bundle branch block

POOR R WAVE PROGRESSION

RATE CHANGE COMPARED TO 21

Electronically Signed on 2021 11:16:55 EST by Ascencion Ballard

## 2021-11-18 ENCOUNTER — HOSPITAL ENCOUNTER (EMERGENCY)
Dept: HOSPITAL 53 - M ED | Age: 26
Discharge: HOME | End: 2021-11-18
Payer: COMMERCIAL

## 2021-11-18 VITALS — SYSTOLIC BLOOD PRESSURE: 99 MMHG | DIASTOLIC BLOOD PRESSURE: 52 MMHG

## 2021-11-18 VITALS — BODY MASS INDEX: 18.48 KG/M2 | WEIGHT: 136.47 LBS | HEIGHT: 72 IN

## 2021-11-18 DIAGNOSIS — Z88.0: ICD-10-CM

## 2021-11-18 DIAGNOSIS — F19.10: Primary | ICD-10-CM

## 2021-11-18 LAB
ALBUMIN SERPL BCG-MCNC: 3.5 GM/DL (ref 3.2–5.2)
ALT SERPL W P-5'-P-CCNC: 75 U/L (ref 12–78)
APAP SERPL-MCNC: < 2 UG/ML (ref 10–30)
BASE EXCESS BLDV CALC-SCNC: 1.2 MMOL/L (ref -2–2)
BASOPHILS # BLD AUTO: 0 10^3/UL (ref 0–0.2)
BASOPHILS NFR BLD AUTO: 0.5 % (ref 0–1)
BILIRUB CONJ SERPL-MCNC: 0.1 MG/DL (ref 0–0.2)
BILIRUB SERPL-MCNC: 0.3 MG/DL (ref 0.2–1)
BUN SERPL-MCNC: 9 MG/DL (ref 7–18)
CALCIUM SERPL-MCNC: 8.8 MG/DL (ref 8.5–10.1)
CHLORIDE SERPL-SCNC: 109 MEQ/L (ref 98–107)
CO2 BLDV CALC-SCNC: 25.8 MEQ/L (ref 24–28)
CO2 SERPL-SCNC: 26 MEQ/L (ref 21–32)
CREAT SERPL-MCNC: 1 MG/DL (ref 0.7–1.3)
EOSINOPHIL # BLD AUTO: 0 10^3/UL (ref 0–0.5)
EOSINOPHIL NFR BLD AUTO: 0.3 % (ref 0–3)
ETHANOL SERPL-MCNC: < 0.003 % (ref 0–0.01)
GFR SERPL CREATININE-BSD FRML MDRD: > 60 ML/MIN/{1.73_M2} (ref 60–?)
GLUCOSE SERPL-MCNC: 62 MG/DL (ref 70–100)
HCO3 BLDV-SCNC: 24.7 MEQ/L (ref 23–27)
HCO3 STD BLDV-SCNC: 25.5 MEQ/L
HCT VFR BLD AUTO: 38.5 % (ref 42–52)
HGB BLD-MCNC: 13 G/DL (ref 13.5–17.5)
LYMPHOCYTES # BLD AUTO: 0.7 10^3/UL (ref 1.5–5)
LYMPHOCYTES NFR BLD AUTO: 12.6 % (ref 24–44)
MCH RBC QN AUTO: 30.9 PG (ref 27–33)
MCHC RBC AUTO-ENTMCNC: 33.8 G/DL (ref 32–36.5)
MCV RBC AUTO: 91.4 FL (ref 80–96)
MONOCYTES # BLD AUTO: 0.5 10^3/UL (ref 0–0.8)
MONOCYTES NFR BLD AUTO: 9.4 % (ref 2–8)
NEUTROPHILS # BLD AUTO: 4.4 10^3/UL (ref 1.5–8.5)
NEUTROPHILS NFR BLD AUTO: 76.7 % (ref 36–66)
OSMOLALITY SERPL: 291 MOSM/KG (ref 275–295)
PCO2 BLDV: 35.9 MMHG (ref 38–50)
PH BLDV: 7.46 UNITS (ref 7.33–7.43)
PLATELET # BLD AUTO: 309 10^3/UL (ref 150–450)
PO2 BLDV: 96.6 MMHG (ref 30–50)
POTASSIUM SERPL-SCNC: 4.1 MEQ/L (ref 3.5–5.1)
PROT SERPL-MCNC: 7 GM/DL (ref 6.4–8.2)
RBC # BLD AUTO: 4.21 10^6/UL (ref 4.3–6.1)
SALICYLATES SERPL-MCNC: < 1.7 MG/DL (ref 5–30)
SAO2 % BLDV: 97.4 % (ref 60–80)
SODIUM SERPL-SCNC: 143 MEQ/L (ref 136–145)
TSH SERPL DL<=0.005 MIU/L-ACNC: 1.52 UIU/ML (ref 0.36–3.74)
WBC # BLD AUTO: 5.7 10^3/UL (ref 4–10)

## 2021-11-18 NOTE — CCD
Summarization Of Episode

                             Created on: 2021



JUWAN VILLELA

External Reference #: [id number]

: 1995

Sex: Undifferentiated



Demographics





                          Address                   [address] [place]  [address]

 

                          Home Phone                [phone]

 

                          Preferred Language        English

 

                          Marital Status            Unknown

 

                          Pentecostalism Affiliation     Unknown

 

                          Race                      Unknown

 

                          Ethnic Group              YES





Author





                          Author                    [facility]

 

                          Organization              [facility]

 

                          Address                   Unknown

 

                          Phone                     Unavailable







Support





                Name            Relationship    Address         Phone

 

                UE              Next Of Kin     Unknown         Unavailable

 

                    CN CONSTRUCTION    Next Of Kin         UNKNOWN

[place]  [address]                    [phone]

 

                    ARTURO HARO    Next Of Kin         -

                          -, -  -                   [phone]

 

                Brenda Haro   Next Of Kin     Unknown         Unavailable

 

                    NURSE, DUTY ON      Next Of Kin         [address] [facility]

[place]  [address]                   Unavailable

 

                    Danilo FNP,  Radha Next Of Kin         [address] [place]  [address]                    [phone]

 

                    ARTURO HARO    ECON                -

                          -, -  -                   [phone]







Care Team Providers





                    Care Team Member Name Role                Phone

 

                    KANDI Holden Unavailable         Unavailable

 

                    Danilo, A Radha FNP Unavailable         Unavailable

 

                    Danilo, A Radha FNP Unavailable         Unavailable

 

                    Danilo, A Radha FNP Unavailable         Unavailable

 

                    Danilo, A Radha FNP Unavailable         Unavailable

 

                    Danilo, A Rahda FNP Unavailable         Unavailable

 

                    Danilo, A Radha FNP Unavailable         Unavailable

 

                    Danilo, A Radha FNP Unavailable         Unavailable

 

                    Danilo, A Radha FNP Unavailable         Unavailable

 

                    Danilo, A Radha FNP Unavailable         Unavailable

 

                    Danilo, A Radha FNP Unavailable         Unavailable

 

                    Danilo, A Radha FNP Unavailable         Unavailable

 

                    Danilo, A Radha FNP Unavailable         Unavailable

 

                    Danilo, A Radha FNP Unavailable         Unavailable

 

                    Danilo, A Radha FNP Unavailable         Unavailable

 

                    Danilo, A Radha FNP Unavailable         Unavailable

 

                    Danilo, A Radha FNP Unavailable         Unavailable

 

                    Danilo, A Radha FNP Unavailable         Unavailable

 

                    Danilo, A Radha FNP Unavailable         Unavailable

 

                    Danilo, A Radha FNP Unavailable         Unavailable

 

                    Danilo, A Radha FNP Unavailable         Unavailable

 

                    Danilo, A Radha FNP Unavailable         Unavailable

 

                    Danilo, A Radha FNP Unavailable         Unavailable

 

                    Danilo, A Radha FNP Unavailable         Unavailable

 

                    Danilo, A Radha FNP Unavailable         Unavailable

 

                    Danilo, A Radha FNP Unavailable         Unavailable

 

                    Danilo, A Radha FNP Unavailable         Unavailable

 

                    Danilo, A Radha FNP Unavailable         Unavailable

 

                    Danilo, A Radha FNP Unavailable         Unavailable

 

                    Danilo, A Radha FNP Unavailable         Unavailable

 

                    Danilo, A Radha FNP Unavailable         Unavailable

 

                    Danilo, A Radha FNP Unavailable         Unavailable

 

                    ANGELICA JAMES MD    Unavailable         Unavailable

 

                    ANGELICA JAMES MD    Unavailable         Unavailable

 

                    ANGELICA JAMES MD    Unavailable         Unavailable

 

                    ANGELICA JAMES MD    Unavailable         Unavailable

 

                    ANGELICA JAMES MD    Unavailable         Unavailable

 

                    ANGELICA JAMES MD    Unavailable         Unavailable

 

                    ANGELICA JAMES MD    Unavailable         Unavailable

 

                    Jose James MD Unavailable         Unavailable

 

                    Locke, E Sammie       Unavailable         Unavailable

 

                    Locke, E Sammie       Unavailable         Unavailable

 

                    Locke, E Sammie       Unavailable         Unavailable

 

                    Locke, E Sammie       Unavailable         Unavailable

 

                    Locke, E Sammie       Unavailable         Unavailable

 

                    Locke, E Sammie       Unavailable         Unavailable

 

                    Locke, E Sammie       Unavailable         Unavailable

 

                    Locke, E Sammie       Unavailable         Unavailable

 

                    Locke, E Sammie       Unavailable         Unavailable

 

                    Locke, E Sammie       Unavailable         Unavailable

 

                    Locke, E Sammie       Unavailable         Unavailable

 

                    Locke, E Sammie       Unavailable         Unavailable

 

                    Locke, E Sammie       Unavailable         Unavailable

 

                    Locke, E Sammie       Unavailable         Unavailable

 

                    Locke, E Sammie       Unavailable         Unavailable

 

                    Locke, E Sammie       Unavailable         Unavailable

 

                    Locke, E Sammie       Unavailable         Unavailable

 

                    TROY Rodriguez MD Unavailable         Unavailable

 

                    TROY Rodriguez MD Unavailable         Unavailable

 

                    TROY Rodriguez MD Unavailable         Unavailable

 

                    TROY Rodriguez MD Unavailable         Unavailable

 

                    TROY Rodriguez MD Unavailable         Unavailable

 

                    TROY Rodriguez MD Unavailable         Unavailable

 

                    TROY Rodriguez MD Unavailable         Unavailable

 

                    TROY Rodriguez MD Unavailable         Unavailable

 

                    TROY Rodriguez MD Unavailable         Unavailable

 

                    TROY Rodriguez MD Unavailable         Unavailable

 

                    TROY Rodriguez MD Unavailable         Unavailable

 

                    TROY Rodriguez MD Unavailable         Unavailable

 

                    TROY Rodriguez MD Unavailable         Unavailable

 

                    TROY Rodriguez MD Unavailable         Unavailable

 

                    TROY Rodriguez MD Unavailable         Unavailable

 

                    TROY Rodriguez MD Unavailable         Unavailable

 

                    TROY Rodriguez MD Unavailable         Unavailable

 

                    TROY Rodriguez MD Unavailable         Unavailable

 

                    TROY Rodriguez MD Unavailable         Unavailable

 

                    TROY Rodriguez MD Unavailable         Unavailable

 

                    TROY Rodriguez MD Unavailable         Unavailable

 

                    TROY Rodriguez MD Unavailable         Unavailable

 

                    TROY Rodriguez MD Unavailable         Unavailable

 

                    TROY Rodriguez MD Unavailable         Unavailable

 

                    TROY Rodriguez MD Unavailable         Unavailable

 

                    TROY Rodriguez MD Unavailable         Unavailable

 

                    TROY Rodriguez MD Unavailable         Unavailable

 

                    TROY Rodriguez MD Unavailable         Unavailable

 

                    TROY Rodriguez MD Unavailable         Unavailable

 

                    TROY Rodriguez MD Unavailable         Unavailable

 

                    TROY Rodriguez MD Unavailable         Unavailable

 

                    TROY Rodriguez MD Unavailable         Unavailable

 

                    TROY Rodriguez MD Unavailable         Unavailable

 

                    TROY Rodriguez MD Unavailable         Unavailable

 

                    TROY Rodriguez MD Unavailable         Unavailable

 

                    TROY Rodriguez MD Unavailable         Unavailable

 

                    TROY Rodriguez MD Unavailable         Unavailable

 

                    TROY Rodriguez MD Unavailable         Unavailable

 

                    TROY Rodriguez MD Unavailable         Unavailable

 

                    TROY Rodriguez MD Unavailable         Unavailable

 

                    TROY Rodriguez MD Unavailable         Unavailable

 

                    TROY Rodriguez MD Unavailable         Unavailable

 

                    TROY Rodriguez MD Unavailable         Unavailable

 

                    TROY Rodriguez MD Unavailable         Unavailable

 

                    TROY Rodriguez MD Unavailable         Unavailable

 

                    TROY Rodriguez MD Unavailable         Unavailable

 

                    RTOY Rodriguez MD Unavailable         Unavailable

 

                    TROY Rodriguez MD Unavailable         Unavailable

 

                    TROY Rodriguez MD Unavailable         Unavailable

 

                    TROY Rodriguez MD Unavailable         Unavailable

 

                    TROY Rodriguez MD Unavailable         Unavailable

 

                    TROY Rodriguez MD Unavailable         Unavailable

 

                    TROY Rodriguez MD Unavailable         Unavailable

 

                    TROY Rodriguez MD Unavailable         Unavailable

 

                    TROY Rodriguez MD Unavailable         Unavailable

 

                    TROY Rodriguez MD Unavailable         Unavailable

 

                    TROY Rodriguez MD Unavailable         Unavailable

 

                    TROY Rodriguez MD Unavailable         Unavailable

 

                    TROY Rodriguez MD Unavailable         Unavailable

 

                    TROY Rodriguez MD Unavailable         Unavailable

 

                    TROY Rodriguez MD Unavailable         Unavailable

 

                    TROY Rodriguez MD Unavailable         Unavailable

 

                    TROY Rodriguez MD Unavailable         Unavailable

 

                    TROY Rodriguez MD Unavailable         Unavailable

 

                    TROY Rodriguez MD Unavailable         Unavailable

 

                    TROY Rodriguez MD Unavailable         Unavailable

 

                    Michael, O Samah MD Unavailable         Unavailable

 

                    Michael, O Samah MD Unavailable         Unavailable

 

                    Michael, O Samah MD Unavailable         Unavailable

 

                    Michael, O Samah MD Unavailable         Unavailable

 

                    Michael, O Samah MD Unavailable         Unavailable

 

                    Michael, O Samah MD Unavailable         Unavailable

 

                    Michael, O Samah MD Unavailable         Unavailable

 

                    Michael, O Samah MD Unavailable         Unavailable

 

                    Michael, O Samah MD Unavailable         Unavailable

 

                    Michael, O Samah MD Unavailable         Unavailable

 

                    Michael, O Samah MD Unavailable         Unavailable

 

                    Michael, O Samah MD Unavailable         Unavailable

 

                    Michael, O Samah MD Unavailable         Unavailable



                                  



Re-disclosure Warning

          The records that you are about to access may contain information from 
federally-assisted alcohol or drug abuse programs. If such information is 
present, then the following federally mandated warning applies: This information
has been disclosed to you from records protected by federal confidentiality 
rules (42 CFR part 2). The federal rules prohibit you from making any further 
disclosure of this information unless further disclosure is expressly permitted 
by the written consent of the person to whom it pertains or as otherwise 
permitted by 42 CFR part 2. A general authorization for the release of medical 
or other information is NOT sufficient for this purpose. The Federal rules 
restrict any use of the information to criminally investigate or prosecute any 
alcohol or drug abuse patient.The records that you are about to access may 
contain highly sensitive health information, the redisclosure of which is 
protected by Article 27-F of the [facility] Public Health law. If you 
continue you may have access to information: Regarding HIV / AIDS; Provided by 
facilities licensed or operated by the [facility] Office of Mental Health; 
or Provided by the [facility] Office for People With Developmental 
Disabilities. If such information is present, then the following [place] 
mandated warning applies: This information has been disclosed to you from 
confidential records which are protected by state law. State law prohibits you 
from making any further disclosure of this information without the specific 
written consent of the person to whom it pertains, or as otherwise permitted by 
law. Any unauthorized further disclosure in violation of state law may result in
a fine or long-term sentence or both. A general authorization for the release of 
medical or other information is NOT sufficient authorization for further disc
losure.                                                                         
    



Allergies and Adverse Reactions

          



           Type       Description Substance  Reaction   Status     Data Source(s

)

 

           Drug allergy Drug allergy Penicillins                       [facility]

[facility]

 

           Allergy to substance Allergy to substance Penicillin antibiotic produ

ct                       

[facility] ([facility])



                                                                                
                 



Encounters

          



           Encounter  Providers  Location   Date       Indications Data Source(s

)

 

           Outpatient                       2021 12:00:00 AM EDT [facility]

 

                                        consult 

 

                Unlisted evaluation and management service Performer: Hallie Holden                 

2021 09:57:00 PM EDT - 2021 05:10:00 PM EDT                         

  [facility] ([facility])

 

                Unlisted evaluation and management service Performer: Hallie Holden                 

2021 08:36:00 PM EDT                           [facility] ([facility])

 

                Outpatient      Attender: Scout Rodriguez MD Main office - [facility] 07/15/2021 

09:30:00 AM EDT                                     JUAN MIGUEL ([facility], )

 

                                        PIO VargasBC: [address], [place] [address], Ph. [phone]                  [facility]: [facility]       2021 12:00:00 AM EDT                     JOHNNIE ([facility])

 

                                        MIREYA Vargas: [address]

[address], [place] [address], Ph. [phone]                  Attender: Radha Carrasco [facility]       2021 12:00:00 AM EDT                     JOHNNIE ([facility])

 

                                        MIREYA Vargas: [address]

[address], [place] [address], Ph. [phone]                  Attender: Radha Carrasco [facility]       2021 12:00:00 AM EDT                     JOHNNIE ([facility])

 

                Unlisted evaluation and management service                      

           2021 04:30:00 PM EST - 

2021 10:53:00 PM EST                           [facility] ([facility])

 

           Outpatient Attender: Sammie Markham   2020 02:40:00 PM EST

            [facility] 

([facility])

 

                          Inpatient                 Attender: Angelica PENALOZAtte

nder: ANGELICA JAMES MDAdmitter: ANGELICA JAMES MD                  [facility]  2020 03:42:00 PM EST - 2021 

10:45:00 AM EST 

PSYCHOACTIVE SUBSTANCE DEPENDENCE       [facility]

 

                                        PSYCHOACTIVE SUBSTANCE DEPENDENCE 

 

                                        Patient discharged. 

 

                Unlisted evaluation and management service Performer: Hallie Holden                 

2020 08:54:00 PM EST - 2020 07:20:00 PM EST                         

  [facility] ([facility])



                                                                                
                                                                                
                          



Medications

          



          Medication Brand Name Start Date Product Form Dose      Route     Admi

nistrative 

Instructions Pharmacy Instructions Status     Indications Reaction   Description

 Data 

Source(s)

 

                          Buprenorphine 12 MG / Naloxone 3 MG Oral Strip Bupreno

rphine 

Hydrochloride/Naloxone Hydrochloride 2020 12:00:00 AM EST                 

          SUBLINGUAL    

                      active                                      [facility] ([facility])

 

                    Clonidine Hydrochloride 0.1 MG Oral Tablet Clonidine HCL    

   2020 12:00:00 AM 

EST                                       active                      [facility] ([facility])

 

                    Hydroxyzine Hydrochloride 25 MG Oral Tablet Hydroxyzine HCL 

    2020 12:00:00 

AM EST                                    active                      [facility] ([facility])

 

                                        Buprenorphine 2 MG / Naloxone 0.5 MG Ora

l Strip buprenorphine 2 mg-naloxone 0.5 

mg sublingual film PLACE ONE FILM UNDER THE TONGUE TWICE A DAY  MAXIMUM DAILY 
DOSE   2 FILMS                          buprenorphine 2 mg-naloxone 0.5 mg subli

ngual film PLACE ONE FILM

UNDER THE TONGUE TWICE A DAY  MAXIMUM DAILY DOSE   2 FILMS                      

                                       completed

                                                buprenorphine 2 MG / naloxone 0.

5 MG Sublingual Film JOHNNIE ([facility])

 

                                        Nicotine 4 MG Chewing Gum nicotine (eladio

crilex) 4 mg gum CHEW 1 PIECE OF GUM 

EVERY HOUR AS NEEDED FOR NICOTINE CRAVINGS nicotine (polacrilex) 4 mg gum CHEW 1

PIECE OF GUM EVERY HOUR AS NEEDED FOR NICOTINE CRAVINGS                         

                        completed         

                          nicotine 4 MG Chewing Gum JOHNNIE ([facility])

 

                                        Buprenorphine 2 MG / Naloxone 0.5 MG Ora

l Strip buprenorphine 2 mg-naloxone 0.5 

mg sublingual film PLACE ONE FILM UNDER THE TONGUE TWICE A DAY  MAXIMUM DAILY 
DOSE   2 FILMS                          buprenorphine 2 mg-naloxone 0.5 mg subli

ngual film PLACE ONE FILM

UNDER THE TONGUE TWICE A DAY  MAXIMUM DAILY DOSE   2 FILMS                      

                                       completed

                                                buprenorphine 2 MG / naloxone 0.

5 MG Sublingual Film JOHNNIE ([facility])

 

                                        quetiapine 50 MG Oral Tablet quetiapine 

50 mg tablet TAKE ONE TABLET BY MOUTH AT

BEDTIME quetiapine 50 mg tablet TAKE ONE TABLET BY MOUTH AT BEDTIME             

                                    

completed                                       quetiapine 50 MG Oral Tablet ATH

TOYIN ([facility])

 

                                        Ibuprofen 800 MG Oral Tablet ibuprofen 8

00 mg tablet TAKE ONE TABLET BY MOUTH 

EVERY 6 HOURS AS NEEDED FOR PAIN        ibuprofen 800 mg tablet TAKE ONE TABLET 

BY 

MOUTH EVERY 6 HOURS AS NEEDED FOR PAIN                                          

 completed               ibuprofen 800 

MG Oral Tablet                          JOHNNIE ([facility])

 

                                        Levofloxacin 250 MG Oral Tablet levoflox

acin 250 mg tablet TAKE ONE TABLET BY 

MOUTH DAILY levofloxacin 250 mg tablet TAKE ONE TABLET BY MOUTH DAILY           

                                        

             completed                              levofloxacin 250 MG Oral Tab

let JOHNNIE ([facility])

 

                                        Cephalexin 500 MG Oral Capsule cephalexi

n 500 mg capsule TAKE ONE CAPSULE BY 

MOUTH THREE TIMES A DAY FOR 10 DAYS     cephalexin 500 mg capsule TAKE ONE CAPSU

LE 

BY MOUTH THREE TIMES A DAY FOR 10 DAYS                                          

 completed               cephalexin 500 

MG Oral Capsule                         JOHNNIE ([facility])

 

                                        Buprenorphine 4 MG / Naloxone 1 MG Oral 

Strip buprenorphine 4 mg-naloxone 1 mg 

sublingual film PLACE ONE FILM UNDER THE TONGUE TWICE A DAY  MAXIMUM DAILY DOSE 
 2 FILMS                                buprenorphine 4 mg-naloxone 1 mg subling

ual film PLACE ONE FILM UNDER 

THE TONGUE TWICE A DAY  MAXIMUM DAILY DOSE   2 FILMS                            

               completed               

buprenorphine 4 MG / naloxone 1 MG Sublingual Film [facility] ([facility])

 

        Tab-A-Violeta 400 mcg tablet TAKE ONE TABLET BY MOUTH EVERY DAY [id number]     

                                             

completed                                       Tab-A-Violeta 400 mcg tablet [facility]

 ([facility])

 

                                        Cephalexin 500 MG Oral Capsule cephalexi

n 500 mg capsule TAKE ONE CAPSULE BY 

MOUTH THREE TIMES A DAY FOR 10 DAYS     cephalexin 500 mg capsule TAKE ONE CAPSU

LE 

BY MOUTH THREE TIMES A DAY FOR 10 DAYS                                          

 completed               cephalexin 500 

MG Oral Capsule                         JOHNNIE ([facility])

 

                                        Clotrimazole 10 MG/ML Topical Cream clot

rimazole 1 % topical cream APPLY 

TOPICALLY TO FEET TWO TIMES A DAY       clotrimazole 1 % topical cream APPLY TOP

ICALLY

TO FEET TWO TIMES A DAY                                           completed     

          clotrimazole 10 MG/ML Topical 

Cream                                   JOHNNIE ([facility])

 

                                        Buprenorphine 4 MG / Naloxone 1 MG Oral 

Strip buprenorphine 4 mg-naloxone 1 mg 

sublingual film PLACE ONE FILM UNDER THE TONGUE TWICE A DAY  MAXIMUM DAILY DOSE 
 2 FILMS                                buprenorphine 4 mg-naloxone 1 mg subling

ual film PLACE ONE FILM UNDER 

THE TONGUE TWICE A DAY  MAXIMUM DAILY DOSE   2 FILMS                            

               completed               

buprenorphine 4 MG / naloxone 1 MG Sublingual Film [facility] [facility]

 

                                        Doxycycline Monohydrate 100 MG Oral Caps

ule doxycycline monohydrate 100 mg 

capsule TAKE ONE CAPSULE BY MOUTH EVERY 12 HOURS doxycycline monohydrate 100 mg 

capsule TAKE ONE CAPSULE BY MOUTH EVERY 12 HOURS                                

           completed               

doxycycline monohydrate 100 MG Oral Capsule [facility] ([facility])

 

        Tab-A-Violeta 400 mcg tablet TAKE ONE TABLET BY MOUTH EVERY DAY [id number]     

                                             

completed                                       Tab-A-Violeta 400 mcg tablet [facility]

 ([facility])

 

                                        buspirone hydrochloride 5 MG Oral Tablet

 buspirone 5 mg tablet TAKE ONE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 normal          

 [facility]  

 

                                        Reference Range:  <10 ng/mL:    Deficien

t  10-30 ng/mL:  Insufficient   

ng/mL: Sufficient  >100 ng/mL:   Toxicity possible 









                    ID                  Date                Data Source

 

                    [id number] 2020 10:54:00 AM [facility]      Value     Range     Interpretation Code Description Data [place]

rce(s) Supporting 

Document(s)

 

           Syphilis Serology            Nonreactive Normal (applies to non-numer

ic results)            [facility]                        









                    ID                  Date                Data Source

 

                    [id number] 2020 10:01:00 AM EST [facility]









          Name      Value     Range     Interpretation Code Description Data [place]

rce(s) Supporting 

Document(s)

 

           Sodium     139 mmol/L 137-145    Normal (applies to non-numeric resul

ts)            [facility]                         

 

           Potassium             3.5-5.1    Normal (applies to non-numeric resul

ts)            [facility]                                 

 

           Chloride   106 mmol/L      Normal (applies to non-numeric resul

ts)            [facility]                         

 

           Carbon Dioxide CO2            22.0-33.0  Normal (applies to non-numer

ic results)            [facility]                         

 

           Anion Gap             4.0-11.0   Normal (applies to non-numeric resul

ts)            [facility]                                 

 

           BUN        17 mg/dL   9-20       Normal (applies to non-numeric resul

ts)            [facility]                                 

 

           Creatinine            0.80-1.50  Normal (applies to non-numeric resul

ts)            [facility]                                 

 

           GFR        88 mL/min  >60        Normal (applies to non-numeric resul

ts)            [facility]                                 

 

                                        Result based on MDRD formula. 

 

          Glucose Level 110 mg/dL 74-99     Above high normal           [facility]  

 

                                        The reference range is only applicable w

hen fasting. 

 

           Calcium-Uncorrected            8.4-10.2   Normal (applies to non-nume

nikki results)            [facility]                         

 

           Corrected Calcium            8.4-10.2   Normal (applies to non-numeri

c results)            [facility]                         

 

           Bilirubin,Total            0.2-1.3    Normal (applies to non-numeric 

results)            [facility]                         

 

           Bilirubin,Direct            0.0-0.3    Normal (applies to non-numeric

 results)            [facility]                         

 

          SGOT(AST) 79 U/L    17-59     Above high normal           [facility]  

 

           SGPT(ALT)  51 U/L     21-72      Normal (applies to non-numeric resul

ts)            [facility]                                 

 

           Alkaline Phosphatase 71 U/L          Normal (applies to non-num

brigette results)            

[facility]                  

 

                                        Pregnancy can increase Alkaline Phosp le

vels up to 2 times the normal adult 

value.      Normal values for children and adolescents are 2 to 3 times the 
normal adult value. 

 

          CPK       695 U/L       Above high normal           [facility]  

 

           Total Protein            6.3-8.2    Normal (applies to non-numeric re

sults)            [facility]                                

 

           Albumin               3.5-5.0    Normal (applies to non-numeric resul

ts)            [facility]                                 

 

                Thyroid Stimulate Hormone TSH                 0.358-3.740     No

rmal (applies to non-numeric 

results)                                [facility]  









                    ID                  Date                Data Source

 

                    [id number] 2020 10:01:00 AM [facility]









          Name      Value     Range     Interpretation Code Description Data [place]

rce(s) Supporting 

Document(s)

 

           Magnesium             1.80-2.40  Normal (applies to non-numeric resul

ts)            [facility]                                 









                    ID                  Date                Data Source

 

                    [id number] 2020 10:01:00 AM [facility]









          Name      Value     Range     Interpretation Code Description Data [place]

rce(s) Supporting 

Document(s)

 

           C-Reactive Protein,Wide Range            <3.00      Above high normal

            [facility]                                 









                    ID                  Date                Data Source

 

                    [id number] 2020 09:31:00 AM [facility]









          Name      Value     Range     Interpretation Code Description Data [place]

rce(s) Supporting 

Document(s)

 

           White Blood Count            4.8-10.8   Normal (applies to non-numeri

c results)            [facility]                         

 

           Red Blood Count            4.35-6.08  Normal (applies to non-numeric 

results)            [facility]                         

 

           Hemoglobin            13.0-17.5  Normal (applies to non-numeric resul

ts)            [facility]                                 

 

           Hematocrit            37.7-51.0  Normal (applies to non-numeric resul

ts)            [facility]                                 

 

           Mean Corpuscular Volume            80-94      Normal (applies to non-

numeric results)            [facility]                        

 

             Mean Corpuscular Hemoglobin              27.0-33.0    Normal (appli

es to non-numeric results) 

                          [facility]    

 

           Mean Corpuscular HGB Conc            32.0-36.0  Normal (applies to no

n-numeric results)            

[facility]                  

 

             Red Cell Distribution Width              11.5-14.5    Normal (appli

es to non-numeric results) 

                          [facility]    

 

           Platelet Count 207 X10 3/uL 130-450    Normal (applies to non-numeric

 results)            

[facility]                  

 

           Mean Platelet Volume            9.6-13.1   Normal (applies to non-num

brigette results)            [facility]                        

 

           Imm Grans% (AUTO) 0 %        0-2        Normal (applies to non-numeri

c results)            [facility]                         

 

           Neutrophils % (AUTO) 52 %       40-75      Normal (applies to non-num

brigette results)            [facility]                        

 

           Lymphocytes % (AUTO) 29 %       21-46      Normal (applies to non-num

brigette results)            [facility]                        

 

           Monocytes % (AUTO) 11 %       5-12       Normal (applies to non-numer

ic results)            [facility]                         

 

          Eosinophils % (AUTO) 7 %       1-5       Above high normal           [facility]  

 

           Basophils % (AUTO) 1 %        0-1        Normal (applies to non-numer

ic results)            [facility]                         

 

           Imm Grans# (AUTO)            0.0-0.5    Normal (applies to non-numeri

c results)            [facility]                         

 

           Neutrophils # (AUTO)            1.5-8.1    Normal (applies to non-num

brigette results)            [facility]                         

 

           Lymphocytes # (AUTO)            1.0-3.1    Normal (applies to non-num

brigette results)            [facility]                         

 

           Monocytes # (AUTO)            0.2-1.3    Normal (applies to non-numer

ic results)            [facility]                         

 

           Eosinophils# (AUTO)            0.0-0.5    Normal (applies to non-nume

nikki results)            [facility]                         

 

           Basophils # (AUTO)            0.0-0.1    Normal (applies to non-numer

ic results)            [facility]                         









                    ID                  Date                Data Source

 

                    [id number] 2020 02:28:00 PM [facility]









          Name      Value     Range     Interpretation Code Description Data [place]

rce(s) Supporting 

Document(s)

 

             Cannabinoids Confirm,Ur result              .            Very abnor

mal (applies to non-numeric units  

                          [facility]    

 

                                        Carboxy THC GC/MS Conf      92          

       ng/mL Cutoff=10        01  

Performed at:  [facility]  [id number]  
: Araceli Reyes MD, Phone:  [phone] 









                    ID                  Date                Data Source

 

                    [id number] 2020 09:06:00 PM EST [facility]









          Name      Value     Range     Interpretation Code Description Data [place]

rce(s) Supporting 

Document(s)

 

           Opiate Screen,Urine            Negative   Normal (applies to non-nume

nikki results)            [facility]                         

 

          Amphetamine Screen,Urine           Negative  Velazquez                  [facility]  

 

                Benzodiazepines Scrn,Ur result                 Negative        N

ormal (applies to non-numeric 

results)                                [facility]  

 

           Cocaine Screen,Urine            Negative   Normal (applies to non-num

brigette results)            [facility]                        

 

           Methadone Screen,Urine            Negative   Normal (applies to non-n

umeric results)            

[facility]                  

 

          Cannabinoid Screen, Ur           Negative  Ha                  [facility]  

 

                                        Therapeutic Drug Ranges for Emergency an

d Rehabilitation                        

      Threshold Levels (ng/mL)  Cocaine                           300  Opiates  
                         300  Cannabinoids                       50  
Barbiturates                      200  Benzodiazepine                    200  
Methadone                         300  Amphetamines                     1000    
All positive findings are presumptive and unconfirmed.    Confirmation of 
positive results are performed only at request of provider.  Unconfirmed results
 must not be used for non-medical purposes (i.e. pre-employment and legal 
purposes) 









                    ID                  Date                Data Source

 

                    [id number]   2020 06:32:00 PM EST [facility]









          Name      Value     Range     Interpretation Code Description Data [place]

rce(s) Supporting 

Document(s)

 

                                        Respiratory specimen severe acute respir

atory syndrome coronavirus 2 

(SARS-CoV-2) RNA                                             [facility]      

 

                                        This lab was ordered by [facility]

oj and reported by [facility]. 









                    ID                  Date                Data Source

 

                    [id number] 2020 06:18:00 PM [facility]









          Name      Value     Range     Interpretation Code Description Data [place]

rce(s) Supporting 

Document(s)

 

           SARS-CoV-2 RNA            Negative   Normal (applies to non-numeric r

esults)            [facility]                         

 

                                        Negative results should be treated as pr

esumptive and, if inconsistent with 

clinical signs and symptoms or necessary for patient management, should be 
tested with different authorized or cleared molecular tests. Negative results do
 not preclude SARS-CoV-2 infection and should not be used as the sole basis for 
patient management decisions. Negative results should be considered in the 
context of a patients recent exposures, history and the presence of clinical 
signs and symptoms consistent with COVID-19.     This test has not been FDA 
cleared or approved; this test has been authorized by FDA under an Emergency Use
 Authorization for use by laboratories certified under the Clinical Laboratory 
Improvement Amendments of 1988 (CLIA), 42 U.S.C. 263a, to perform moderate 
complexity/high complexity tests and at the Point of Care (POC), i.e., in 
patient care settings operating under a CLIA Certificate of Waiver, Certificate 
of Compliance, or Certificate of Accreditation.    Factsheets for healthcare 
providers:  https://www.fda.gov/media/136136/download  Factsheets for patients: 
 https://www.fda.gov/media/212859/download    The ID NOW Instrument is a rapid 
molecular in vitro diagnostic test utilizing an isothermal nucleic acid 
amplification technology intended for the qualitative detection of nucleic acid 
from the SARS-CoV-2 viral RNA.    THIS IS A STATE REPORTABLE COMMUNICABLE 
DISEASE.    Manual entry verified  by Cherie Hood 20 









                    ID                  Date                Data Source

 

                    [id number] 2020 11:55:00 PM EST [facility]









          Name      Value     Range     Interpretation Code Description Data [place]

rce(s) Supporting 

Document(s)

 

           Color,Urine            Yellow     Normal (applies to non-numeric resu

lts)            [facility]                                 

 

           Clarity,Urine            Clear      Normal (applies to non-numeric re

sults)            [facility]                                 

 

           Specific Gravity,Urine            1.001-1.030 Normal (applies to non-

numeric results)            

[facility]                  

 

           PH,Urine              5.0-8.0    Normal (applies to non-numeric resul

ts)            [facility]                                 

 

           Protein,Urine            Negative   Normal (applies to non-numeric re

sults)            [facility]                         

 

           Glucose,Urine (UA)            Negative   Normal (applies to non-numer

ic results)            [facility]                         

 

          Ketones,Urine           Negative  [facility]

ospital  

 

           Blood,Urine            Negative   Normal (applies to non-numeric resu

lts)            [facility]                                 

 

           Bilirubin,Urine            Negative   Normal (applies to non-numeric 

results)            [facility]                         

 

           Urobilinogen,Urine            Norm 0.2-1 Normal (applies to non-numer

ic results)            [facility]                        

 

           Leukocyte Esterase,Urine            Negative   Normal (applies to non

-numeric results)            

[facility]                  

 

           Nitrite,Urine            Negative   Normal (applies to non-numeric re

sults)            [facility]                         







                                        Procedure

 

                                          



                                                                                
                                                                                
                                                                                
                                                                              



Social History

          No Information                                                        
                                



Vital Signs

          



                    ID                  Date                Data Source

 

                    UNK                                      









           Name       Value      Range      Interpretation Code Description Data

 Source(s)

 

           Systolic blood pressure 104.0 MM[HG]                       104.0 MM[H

G] NETSMART ([facility])

 

           Diastolic blood pressure 61.0 MM[HG]                       61.0 MM[HG

] NETSMART ([facility])

 

           Heart rate 94.0 /MIN                        94.0 /MIN  [facility]

 

           Respiratory rate 16.0 /MIN                        16.0 /MIN  NETSMART

 ([facility])

 

           Body temperature 97.7 [DEGF]                       97.7 [DEGF] NETSMA

RT ([facility])

 

           Systolic blood pressure 117.0 MM[HG]                       117.0 MM[H

G] NETSMART ([facility])

 

           Diastolic blood pressure 62.0 MM[HG]                       62.0 MM[HG

] NETSMART ([facility])

 

           Body temperature 36.5 LACEY                         36.5 LACEY   NETSMART

 ([facility])

 

           Heart rate 64.0 /MIN                        64.0 /MIN  [facility] ([facility]

 

           Body temperature 97.8 [DEGF]                       97.8 [DEGF] NETSMA

RT ([facility])

 

           Body temperature 36.6 LACEY                         36.6 LACEY   NETSMART

 ([facility])

 

           Heart rate 90.0 /MIN                        90.0 /MIN  [facility]

 

           Respiratory rate 16.0 /MIN                        16.0 /MIN  NETSMART

 ([facility])

 

           Systolic blood pressure 110.0 MM[HG]                       110.0 MM[H

G] NETSMART ([facility])

 

           Diastolic blood pressure 66.0 MM[HG]                       66.0 MM[HG

] NETSMART ([facility])

 

           Body temperature 97.3 [DEGF]                       97.3 [DEGF] NETSMA

RT ([facility])

 

           Body temperature 36.3 LACEY                         36.3 LACEY   NETSMART

 ([facility])

 

           Heart rate 64.0 /MIN                        64.0 /MIN  [facility] ([facility]

 

           Respiratory rate 16.0 /MIN                        16.0 /MIN  NETSMART

 ([facility])

 

           Systolic blood pressure 118.0 MM[HG]                       118.0 MM[H

G] NETSMART ([facility])

 

           Diastolic blood pressure 69.0 MM[HG]                       69.0 MM[HG

] NETSMART ([facility])

 

           Heart rate 86.0 /MIN                        86.0 /MIN  [facility] ([facility]

 

           Systolic blood pressure 110.0 MM[HG]                       110.0 MM[H

G] NETSMART ([facility])

 

           Diastolic blood pressure 69.0 MM[HG]                       69.0 MM[HG

] NETSMART ([facility])

 

           Respiratory rate 17.0 /MIN                        17.0 /MIN  NETSMART

 ([facility])

 

           Respiratory rate 16.0 /MIN                        16.0 /MIN  NETSMART

 ([facility])

 

           Systolic blood pressure 120.0 MM[HG]                       120.0 MM[H

G] NETSMART ([facility])

 

           Diastolic blood pressure 64.0 MM[HG]                       64.0 MM[HG

] NETSMART ([facility])

 

           Heart rate 71.0 /MIN                        71.0 /MIN  NETSMART ([facility])

 

           Body temperature 97.5 [DEGF]                       97.5 [DEGF] NETSMA

RT ([facility])

 

           Body temperature 36.4 LACEY                         36.4 LACEY   NETSMART

 ([facility])

 

           Body temperature 37.1 LACEY                         37.1 LACEY   NETSMART

 ([facility])

 

           Heart rate 74.0 /MIN                        74.0 /MIN  NETSMART ([facility])

 

           Systolic blood pressure 131.0 MM[HG]                       131.0 MM[H

G] NETSMART ([facility])

 

           Diastolic blood pressure 71.0 MM[HG]                       71.0 MM[HG

] NETSMART ([facility])

 

           Body height 182.9 cm                         182.9 cm   [facility] ([facility])

 

           Body weight 63.6 KG                          63.6 KG    [facility]SMART ([facility])

 

           Body mass index (BMI) [Ratio] 19.0                             19.0  

     NETSMART ([facility])

 

           Body temperature 98.7 [DEGF]                       98.7 [DEGF] NETSMA

RT ([facility])

 

           Respiratory rate 16.0 /MIN                        16.0 /MIN  NETSMART

 ([facility])

 

           Oxygen saturation in Arterial blood by Pulse oximetry 100.0 %        

                  100.0 %    

NETSMART ([facility])

 

           Respiratory rate 12 /min                          12 /min    MEDENT (

[facility], )

 

           Body height 72 [in_i]                        72 [in_i]  MEDENT ([facility] )

 

                                        6'0" 

 

           Body weight 145.00 [lb_av]                       145.00 [lb_av] MEDEN

T ([facility], 

)

 

           Body mass index (BMI) [Ratio] 19.7 kg/m2                       19.7 k

g/m2 MEDENT ([facility], )

 

           Ideal body weight 178 [lb_av]                       178 [lb_av] DESI

T ([facility], 

)

 

           Body height 72 [in_i]                        72 [in_i]  JOHNNIE ([facility])

 

           Diastolic blood pressure 62 mm[Hg]                        62 mm[Hg]  

JOHNNIE ([facility])

 

           Body height 72 [in_i]                        72 [in_i]  JOHNNIE ([facility])

 

           Body mass index (BMI) [Ratio] 19.4 kg/m2                       19.4 k

g/m2 JOHNNIE ([facility])

 

           Systolic blood pressure 98 mm[Hg]                        98 mm[Hg]  [facility]

 

           Body weight 2290 [oz_av]                       2290 [oz_av] JOHNNIE ([facility])

 

           Diastolic blood pressure 62 mm[Hg]                        62 mm[Hg]  

JOHNNIE ([facility])

 

           Body height 72 [in_i]                        72 [in_i]  JOHNNIE ([facility])

 

           Body mass index (BMI) [Ratio] 19.4 kg/m2                       19.4 k

g/m2 JOHNNIE ([facility])

 

           Systolic blood pressure 98 mm[Hg]                        98 mm[Hg]  A

THENA ([facility])

 

           Body weight 2290 [oz_av]                       2290 [oz_av] JOHNNIE ([facility])

 

           Heart rate 72.0 /MIN                        72.0 /MIN  NETSMART ([facility]

 

           Body temperature 36.4 LACEY                         36.4 LACEY   NETSMART

 ([facility])

 

           Respiratory rate 14.0 /MIN                        14.0 /MIN  NETSMART

 ([facility])

 

           Systolic blood pressure 122.0 MM[HG]                       122.0 MM[H

G] NETSMART ([facility])

 

           Diastolic blood pressure 68.0 MM[HG]                       68.0 MM[HG

] NETSMART ([facility])

 

           Body temperature 97.6 [DEGF]                       97.6 [DEGF] NETSMA

RT ([facility])

 

           Body temperature 36.6 LACEY                         36.6 LACEY   NETSMART

 ([facility])

 

           Heart rate 92.0 /MIN                        92.0 /MIN  NETSMART ([facility]

 

           Respiratory rate 17.0 /MIN                        17.0 /MIN  NETSMART

 ([facility])

 

           Systolic blood pressure 114.0 MM[HG]                       114.0 MM[H

G] NETSMART ([facility])

 

           Diastolic blood pressure 74.0 MM[HG]                       74.0 MM[HG

] NETSMART ([facility])

 

           Body temperature 97.8 [DEGF]                       97.8 [DEGF] NETSMA

RT ([facility])

 

           Body temperature 98.2 [DEGF]                       98.2 [DEGF] NETSMA

RT ([facility])

 

           Body temperature 36.8 LACEY                         36.8 LACEY   NETSMART

 ([facility])

 

           Systolic blood pressure 119.0 MM[HG]                       119.0 MM[H

G] NETSMART ([facility])

 

           Respiratory rate 16.0 /MIN                        16.0 /MIN  NETSMART

 ([facility])

 

           Diastolic blood pressure 68.0 MM[HG]                       68.0 MM[HG

] NETSMART ([facility])

 

           Heart rate 79.0 /MIN                        79.0 /MIN  [facility]SMART ([facility]

 

           Body temperature 97.5 [DEGF]                       97.5 [DEGF] NETSMA

RT ([facility])

 

           Body temperature 36.4 LACEY                         36.4 LACEY   NETSMART

 ([facility])

 

           Heart rate 64.0 /MIN                        64.0 /MIN  [facility]SMART ([facility]

 

           Respiratory rate 16.0 /MIN                        16.0 /MIN  NETSMART

 ([facility])

 

           Systolic blood pressure 107.0 MM[HG]                       107.0 MM[H

G] NETSMART ([facility])

 

           Diastolic blood pressure 60.0 MM[HG]                       60.0 MM[HG

] NETSMART ([facility])

 

           Systolic blood pressure 113.0 MM[HG]                       113.0 MM[H

G] NETSMART ([facility])

 

           Diastolic blood pressure 63.0 MM[HG]                       63.0 MM[HG

] NETSMART ([facility])

 

           Respiratory rate 16.0 /MIN                        16.0 /MIN  NETSMART

 ([facility])

 

           Heart rate 53.0 /MIN                        53.0 /MIN  [facility]SMART ([facility]

 

           Body temperature 98.7 [DEGF]                       98.7 [DEGF] NETSMA

RT ([facility])

 

           Body temperature 37.1 LACEY                         37.1 LACEY   NETSMART

 ([facility])

 

           Heart rate 74.0 /MIN                        74.0 /MIN  [facility]SMART ([facility]

 

           Systolic blood pressure 103.0 MM[HG]                       103.0 MM[H

G] NETSMART ([facility])

 

           Diastolic blood pressure 62.0 MM[HG]                       62.0 MM[HG

] NETSMART ([facility])

 

           Respiratory rate 17.0 /MIN                        17.0 /MIN  NETSMART

 ([facility])

 

           Body temperature 98.7 [DEGF]                       98.7 [DEGF] NETSMA

RT ([facility])

 

           Heart rate 74.0 /MIN                        74.0 /MIN  [facility]SMART ([facility]

 

           Systolic blood pressure 103.0 MM[HG]                       103.0 MM[H

G] NETSMART ([facility])

 

           Body temperature 37.1 LACEY                         37.1 LACEY   NETSMART

 ([facility])

 

           Respiratory rate 17.0 /MIN                        17.0 /MIN  NETSMART

 ([facility])

 

           Diastolic blood pressure 62.0 MM[HG]                       62.0 MM[HG

] NETSMART ([facility])

 

           Heart rate 73.0 /MIN                        73.0 /MIN  [facility]SMART ([facility]

 

           Respiratory rate 16.0 /MIN                        16.0 /MIN  NETSMART

 ([facility])

 

           Systolic blood pressure 107.0 MM[HG]                       107.0 MM[H

G] NETSMART ([facility])

 

           Body temperature 97.7 [DEGF]                       97.7 [DEGF] NETSMA

RT ([facility])

 

           Diastolic blood pressure 61.0 MM[HG]                       61.0 MM[HG

] NETSMART ([facility])

 

           Body temperature 36.5 LACEY                         36.5 LACEY   NETSMART

 ([facility])

 

           Body temperature 98.7 [DEGF]                       98.7 [DEGF] NETSMA

RT ([facility])

 

           Heart rate 16.0 /MIN                        16.0 /MIN  [facility]SMART ([facility]

 

           Respiratory rate 16.0 /MIN                        16.0 /MIN  NETSMART

 ([facility])

 

           Systolic blood pressure 136.0 MM[HG]                       136.0 MM[H

G] NETSMART ([facility])

 

           Diastolic blood pressure 76.0 MM[HG]                       76.0 MM[HG

] NETSMART ([facility])

 

           Body temperature 37.1 LACEY                         37.1 LACEY   NETSMART

 ([facility])

 

           Respiratory rate 16.0 /MIN                        16.0 /MIN  NETSMART

 ([facility])

 

           Systolic blood pressure 112.0 MM[HG]                       112.0 MM[H

G] NETSMART ([facility])

 

           Body temperature 97.8 [DEGF]                       97.8 [DEGF] NETSMA

RT ([facility])

 

           Body temperature 36.6 LACEY                         36.6 LACEY   NETSMART

 ([facility])

 

           Heart rate 82.0 /MIN                        82.0 /MIN  [facility]SMART ([facility]

 

           Diastolic blood pressure 70.0 MM[HG]                       70.0 MM[HG

] NETSMART ([facility])

 

           Body temperature 36.8 LACEY                         36.8 LACEY   NETSMART

 ([facility])

 

           Heart rate 87.0 /MIN                        87.0 /MIN  [facility]SMART ([facility]

 

           Respiratory rate 18.0 /MIN                        18.0 /MIN  NETSMART

 ([facility])

 

           Systolic blood pressure 115.0 MM[HG]                       115.0 MM[H

G] NETSMART ([facility])

 

           Diastolic blood pressure 75.0 MM[HG]                       75.0 MM[HG

] NETSMART ([facility])

 

           Body temperature 98.2 [DEGF]                       98.2 [DEGF] NETSMA

RT ([facility])

 

           Body temperature 97.8 [DEGF]                       97.8 [DEGF] NETSMA

RT ([facility])

 

           Heart rate 91.0 /MIN                        91.0 /MIN  [facility]SMART ([facility]

 

           Body temperature 36.6 LACEY                         36.6 LACEY   NETSMART

 ([facility])

 

           Respiratory rate 16.0 /MIN                        16.0 /MIN  NETSMART

 ([facility])

 

           Systolic blood pressure 107.0 MM[HG]                       107.0 MM[H

G] NETSMART ([facility])

 

           Diastolic blood pressure 62.0 MM[HG]                       62.0 MM[HG

] NETSMART ([facility])

 

           Oxygen saturation in Arterial blood by Pulse oximetry 99.0 %         

                  99.0 %     NETSMART

 ([facility])









                    ID                  Date                Data Source

 

                    [id number]           2021 09:35:00 AM EST [facility]









           Name       Value      Range      Interpretation Code Description Data

 Source(s)

 

           Weight Measurement Method 1                                1         

 [facility]

 

           Weight (Calculated Kilograms) 59.42                            59.42 

     [facility]

 

           Weight     2106                             2096       [facility]

 

           Temperature Source 7                                7          [facility]

 

           Temperature 97.6                             97.6       [facility]

 

           Respiratory Effort 1                                1          [facility]

 

           Respiratory Rate 16                               16         [facility]

 

           Pulse Assessment Method 4                                4          [facility]

 

           Pulse Rate 66                               66         [facility]

 

           Height (Calculated Centimeters) 180.34                           180.

34     [facility]

 

           Height     71                               71         [facility]

 

           Blood Pressure 130/55                           130/55     [facility]

 

           Body Mass Index (BMI) 18.2                             18.2       [facility]

 

           Weight Measurement Method 1                                1         

 [facility]

 

           Weight (Calculated Kilograms) 59.42                            59.42 

     [facility]

 

           Weight     2096       [facility]

 

           Temperature Source 7                                7          [facility]

 

           Temperature 97.6                             97.6       [facility]

 

           Respiratory Effort 1                                1          [facility]

 

           Respiratory Rate 16                               16         [facility]

 

           Pulse Assessment Method 4                                4          [facility]

 

           Pulse Rate 66                               66         [facility]

 

           Height (Calculated Centimeters) 180.34                           180.

34     [facility]

 

           Height     71                               71         [facility]

 

           Blood Pressure 130/55                           130/55     [facility]

 

           Body Mass Index (BMI) 18.2                             18.2       [facility]

 

           Weight Measurement Method 1                                1         

 [facility]

 

           Weight (Calculated Kilograms) 59.42                            59.42 

     [facility]

 

           Weight     2096       [facility]

 

           Temperature Source 7                                7          [facility]

 

           Temperature 97.6                             97.6       [facility]

 

           Respiratory Effort 1                                1          [facility]

 

           Respiratory Rate 16                               16         [facility]

 

           Pulse Assessment Method 4                                4          [facility]

 

           Pulse Rate 66                               66         [facility]

 

           Height (Calculated Centimeters) 180.34                           180.

34     [facility]

 

           Height     71                               71         [facility]

 

           Blood Pressure 130/55                           130/55     [facility]

 

           Body Mass Index (BMI) 18.2                             18.2       [facility]

 

           Weight Measurement Method 1                                1         

 [facility]

 

           Weight (Calculated Kilograms) 59.42                            59.42 

     [facility]

 

           Weight     2096       [facility]

 

           Temperature Source 7                                7          [facility]

 

           Temperature 96.7                             96.7       [facility]

 

           Respiratory Effort 1                                1          [facility]

 

           Respiratory Rate 16                               16         [facility]

 

           Pulse Assessment Method 4                                4          [facility]

 

           Pulse Rate 49                               49         [facility]

 

           Height (Calculated Centimeters) 180.34                           180.

34     [facility]

 

           Height     71                               71         [facility]

 

           Blood Pressure 96/56                            96/56      [facility]

 

           Body Mass Index (BMI) 18.2                             18.2       [facility]

 

           Weight Measurement Method 1                                1         

 [facility]

 

           Weight (Calculated Kilograms) 59.42                            59.42 

     [facility]

 

           Weight     2096                             2096       [facility]

 

           Temperature Source 7                                7          [facility]

 

           Temperature 96.7                             96.7       [facility]

 

           Respiratory Effort 1                                1          [facility]

 

           Respiratory Rate 15                               15         [facility]

 

           Pulse Assessment Method 3                                3          [facility]

 

           Pulse Rate 72                               72         [facility]

 

           Height (Calculated Centimeters) 180.34                           180.

34     [facility]

 

           Height     71                               71         [facility]

 

           Blood Pressure 110/68                           110/68     [facility]

 

           Body Mass Index (BMI) 18.2                             18.2       [facility]

 

           Weight Measurement Method 1                                1         

 [facility]

 

           Weight (Calculated Kilograms) 59.42                            59.42 

     [facility]

 

           Weight     2096                             2096       [facility]

 

           Temperature Source 7                                7          [facility]

 

           Temperature 98.0                             98.0       [facility]

 

           Respiratory Effort 1                                1          [facility]

 

           Respiratory Rate 16                               16         [facility]

 

           Pulse Assessment Method 4                                4          [facility]

 

           Pulse Rate 76                               76         [facility]

 

           Height (Calculated Centimeters) 180.34                           180.

34     [facility]

 

           Height     71                               71         [facility]

 

           Blood Pressure 128/60                           128/60     [facility]

 

           Body Mass Index (BMI) 18.2                             18.2       [facility]



                                                                                
                            



Patient Treatment Plan of Care

          



             Planned Activity Planned Date Details      Description  Data Source

(s)

 

             Tab-A-Violeta 400 mcg tablet TAKE ONE TABLET BY MOUTH EVERY DAY       

                                 J[facility]NNIE ([facility])

 

             Sulfamethoxazole 800 MG / Trimethoprim 160 MG Oral Tablet          

                              [facility] ([facility])

 

             quetiapine 50 MG Oral Tablet                                       

 [facility] ([facility])

 

             Nicotine 4 MG Chewing Gum                                        AT

[facility] ([facility])

 

                                        Narcan 4 mg/actuation nasal spray SPRAY 

2 SPRAYS  4MG  IN EACH NOSTRIL AS 

DIRECTED FOR SUSPECTED OPOID OVERDOSE AND CALL 911                              

                   JOHNNIE ([facility])

 

             methylprednisolone 4 mg tablets in a dose pack USE AS DIRECTED     

                                   [facility] 

([facility])

 

             Levofloxacin 250 MG Oral Tablet                                    

    [facility] ([facility])

 

             Ibuprofen 800 MG Oral Tablet                                       

 [facility] ([facility])

 

             Hydroxyzine Hydrochloride 25 MG Oral Tablet                        

                [facility] ([facility])

 

             Doxycycline Monohydrate 100 MG Oral Capsule                        

                [facility] ([facility])

 

             Clotrimazole 10 MG/ML Topical Cream                                

        [facility] ([facility])

 

             Cholecalciferol 1000 UNT Oral Tablet                               

         [facility] ([facility])

 

             Cephalexin 500 MG Oral Capsule                                     

   [facility] ([facility])

 

             buspirone hydrochloride 5 MG Oral Tablet                           

             [facility] ([facility])

 

             Buprenorphine 8 MG / Naloxone 2 MG Oral Strip                      

                  [facility] ([facility])

 

             Buprenorphine 4 MG / Naloxone 1 MG Oral Strip                      

                  [facility] ([facility])

 

             Buprenorphine 2 MG / Naloxone 0.5 MG Oral Strip                    

                    [facility] ([facility])

 

             Tab-A-Violeta 400 mcg tablet TAKE ONE TABLET BY MOUTH EVERY DAY       

                                 [facility] ([facility])

 

             quetiapine 50 MG Oral Tablet                                       

 [facility] ([facility])

 

             Nicotine 4 MG Chewing Gum                                        AT

[facility])

 

                                        Narcan 4 mg/actuation nasal spray SPRAY 

2 SPRAYS  4MG  IN EACH NOSTRIL AS 

DIRECTED FOR SUSPECTED OPOID OVERDOSE AND CALL 911                              

                   [facility]IE ([facility])

 

             methylprednisolone 4 mg tablets in a dose pack USE AS DIRECTED     

                                   [facility] 

([facility])

 

             Ibuprofen 800 MG Oral Tablet                                       

 [facility] [facility]

 

             Hydroxyzine Hydrochloride 25 MG Oral Tablet                        

                JOHNNIE ([facility])

 

             Doxycycline Monohydrate 100 MG Oral Capsule                        

                JOHNNIE ([facility])

 

             Clotrimazole 10 MG/ML Topical Cream                                

        JOHNNIE ([facility])

 

             Cholecalciferol 1000 UNT Oral Tablet                               

         JOHNNIE ([facility])

 

             Cephalexin 500 MG Oral Capsule                                     

   JOHNNIE ([facility])

 

             buspirone hydrochloride 5 MG Oral Tablet                           

             JOHNNIE ([facility])

 

             Buprenorphine 8 MG / Naloxone 2 MG Oral Strip                      

                  JOHNNIE ([facility])

 

             Buprenorphine 4 MG / Naloxone 1 MG Oral Strip                      

                  JOHNNIE ([facility])

 

             Buprenorphine 2 MG / Naloxone 0.5 MG Oral Strip                    

                    JOHNNIE ([facility])

## 2021-11-18 NOTE — CCD
Summarization Of Episode

                             Created on: 2021



JUWAN VILLELA

External Reference #: [id number]

: 1995

Sex: Undifferentiated



Demographics





                          Address                   [address] [place]  [address]

 

                          Home Phone                [phone]

 

                          Preferred Language        English

 

                          Marital Status            Unknown

 

                          Taoism Affiliation     Unknown

 

                          Race                      Unknown

 

                          Ethnic Group              YES





Author





                          Author                    [facility]

 

                          Organization              [facility]

 

                          Address                   Unknown

 

                          Phone                     Unavailable







Support





                Name            Relationship    Address         Phone

 

                UE              Next Of Kin     Unknown         Unavailable

 

                    CN CONSTRUCTION    Next Of Kin         UNKNOWN

[place]  [address]                    [phone]

 

                    ARTURO HARO    Next Of Kin         -

                          -, -  -                   [phone]

 

                Brenda Haro   Next Of Kin     Unknown         Unavailable

 

                    NURSE, DUTY ON      Next Of Kin         [address] [facility] [place] [address]

[place]  [address]                   Unavailable

 

                    Danilo FNP,  Radha Next Of Kin         [address] [place]  [address]                    [phone]

 

                    ARTURO HARO    ECON                -

                          -, -  -                   [phone]







Care Team Providers





                    Care Team Member Name Role                Phone

 

                    KANDI Holden Unavailable         Unavailable

 

                    Danilo, A Radha FNP Unavailable         Unavailable

 

                    Danilo, A Radha FNP Unavailable         Unavailable

 

                    Danilo, A Radha FNP Unavailable         Unavailable

 

                    Danilo, A Radha FNP Unavailable         Unavailable

 

                    Danilo, A Radha FNP Unavailable         Unavailable

 

                    Danilo, A Radha FNP Unavailable         Unavailable

 

                    Danilo, A Radha FNP Unavailable         Unavailable

 

                    Danilo, A Radha FNP Unavailable         Unavailable

 

                    Danlio, A Radha FNP Unavailable         Unavailable

 

                    Danilo, A Radha FNP Unavailable         Unavailable

 

                    Danilo, A Radha FNP Unavailable         Unavailable

 

                    Danilo, A Radha FNP Unavailable         Unavailable

 

                    Danilo, A Radha FNP Unavailable         Unavailable

 

                    Danilo, A Radha FNP Unavailable         Unavailable

 

                    Danilo, A Radha FNP Unavailable         Unavailable

 

                    Danilo, A Radha FNP Unavailable         Unavailable

 

                    Danilo, A Radha FNP Unavailable         Unavailable

 

                    Danilo, A Radha FNP Unavailable         Unavailable

 

                    Danilo, A Radha FNP Unavailable         Unavailable

 

                    Danilo, A Radha FNP Unavailable         Unavailable

 

                    Danilo, A Radha FNP Unavailable         Unavailable

 

                    Danilo, A Radha FNP Unavailable         Unavailable

 

                    Danilo, A Radha FNP Unavailable         Unavailable

 

                    Danilo, A Radha FNP Unavailable         Unavailable

 

                    Danilo, A Radha FNP Unavailable         Unavailable

 

                    Danilo, A Radha FNP Unavailable         Unavailable

 

                    Danilo, A Radha FNP Unavailable         Unavailable

 

                    Danilo, A Radha FNP Unavailable         Unavailable

 

                    Danilo, A Radha FNP Unavailable         Unavailable

 

                    Danilo, A Radha FNP Unavailable         Unavailable

 

                    Danilo, A Radha FNP Unavailable         Unavailable

 

                    ANGELICA JAMES MD    Unavailable         Unavailable

 

                    ANGELICA JAMES MD    Unavailable         Unavailable

 

                    ANGELICA JAMES MD    Unavailable         Unavailable

 

                    ANGELICA JAMES MD    Unavailable         Unavailable

 

                    ANGELICA JAMES MD    Unavailable         Unavailable

 

                    ANGELICA JAMES MD    Unavailable         Unavailable

 

                    ANGELICA JAMES MD    Unavailable         Unavailable

 

                    Jose James MD Unavailable         Unavailable

 

                    Locke, E Sammie       Unavailable         Unavailable

 

                    Locke, E Sammie       Unavailable         Unavailable

 

                    Locke, E Sammie       Unavailable         Unavailable

 

                    Locke, E Sammie       Unavailable         Unavailable

 

                    Locke, E Sammie       Unavailable         Unavailable

 

                    Locke, E Sammie       Unavailable         Unavailable

 

                    Locke, E Sammie       Unavailable         Unavailable

 

                    Locke, E Sammie       Unavailable         Unavailable

 

                    Locke, E Sammie       Unavailable         Unavailable

 

                    Locke, E Sammie       Unavailable         Unavailable

 

                    Locke, E Sammie       Unavailable         Unavailable

 

                    Locke, E Sammie       Unavailable         Unavailable

 

                    Locke, E Sammie       Unavailable         Unavailable

 

                    Locke, E Sammie       Unavailable         Unavailable

 

                    Locke, E Sammie       Unavailable         Unavailable

 

                    Locke, E Sammie       Unavailable         Unavailable

 

                    Locke, E Sammie       Unavailable         Unavailable

 

                    TROY Rodriguez MD Unavailable         Unavailable

 

                    TROY Rodriguez MD Unavailable         Unavailable

 

                    TROY Rodriguez MD Unavailable         Unavailable

 

                    TROY Rodriguez MD Unavailable         Unavailable

 

                    TROY Rodriguez MD Unavailable         Unavailable

 

                    TROY Rodriguez MD Unavailable         Unavailable

 

                    TROY Rodriguez MD Unavailable         Unavailable

 

                    TROY Rodriguez MD Unavailable         Unavailable

 

                    TROY Rodriguez MD Unavailable         Unavailable

 

                    TROY Rodriguez MD Unavailable         Unavailable

 

                    TROY Rodriguez MD Unavailable         Unavailable

 

                    TROY Rodriguez MD Unavailable         Unavailable

 

                    TROY Rodriguez MD Unavailable         Unavailable

 

                    TROY Rodriguez MD Unavailable         Unavailable

 

                    TROY Rodriguez MD Unavailable         Unavailable

 

                    TROY Rodriguez MD Unavailable         Unavailable

 

                    TROY Rodriguez MD Unavailable         Unavailable

 

                    TROY Rodriguez MD Unavailable         Unavailable

 

                    TROY Rodriguez MD Unavailable         Unavailable

 

                    TROY Rodriguez MD Unavailable         Unavailable

 

                    TROY Rodriguez MD Unavailable         Unavailable

 

                    TROY Rodriguez MD Unavailable         Unavailable

 

                    TROY Rodriguez MD Unavailable         Unavailable

 

                    TROY Rodriguez MD Unavailable         Unavailable

 

                    TROY Rodriguez MD Unavailable         Unavailable

 

                    TROY Rodriguez MD Unavailable         Unavailable

 

                    TROY Rodriguez MD Unavailable         Unavailable

 

                    TROY Rodriguez MD Unavailable         Unavailable

 

                    TROY Rodriguez MD Unavailable         Unavailable

 

                    TROY Rodriguez MD Unavailable         Unavailable

 

                    TROY Rodriguez MD Unavailable         Unavailable

 

                    TROY Rodriguez MD Unavailable         Unavailable

 

                    TROY Rodriguez MD Unavailable         Unavailable

 

                    TROY Rodriguez MD Unavailable         Unavailable

 

                    TROY Rodriguez MD Unavailable         Unavailable

 

                    TROY Rodriguez MD Unavailable         Unavailable

 

                    TROY Rodriguez MD Unavailable         Unavailable

 

                    TROY Rodriguez MD Unavailable         Unavailable

 

                    TROY Rodriguez MD Unavailable         Unavailable

 

                    TROY Rodriguez MD Unavailable         Unavailable

 

                    TROY Rodriguez MD Unavailable         Unavailable

 

                    TROY Rodriguez MD Unavailable         Unavailable

 

                    TROY Rodriguez MD Unavailable         Unavailable

 

                    TROY Rodriguez MD Unavailable         Unavailable

 

                    TROY Rodriguez MD Unavailable         Unavailable

 

                    TROY Rodriguez MD Unavailable         Unavailable

 

                    TROY Rodriguez MD Unavailable         Unavailable

 

                    TROY Rodriguez MD Unavailable         Unavailable

 

                    TROY Rodriguez MD Unavailable         Unavailable

 

                    TROY Rodriguez MD Unavailable         Unavailable

 

                    TROY Rodriguez MD Unavailable         Unavailable

 

                    TROY Rodriguez MD Unavailable         Unavailable

 

                    TROY Rodriguez MD Unavailable         Unavailable

 

                    TROY Rodriguez MD Unavailable         Unavailable

 

                    TROY Rodriguez MD Unavailable         Unavailable

 

                    TROY Rodriguez MD Unavailable         Unavailable

 

                    TROY Rodriguez MD Unavailable         Unavailable

 

                    TROY Rodriguez MD Unavailable         Unavailable

 

                    TROY Rodriguez MD Unavailable         Unavailable

 

                    TROY Rodriguez MD Unavailable         Unavailable

 

                    TROY Rodriguez MD Unavailable         Unavailable

 

                    TROY Rodriguez MD Unavailable         Unavailable

 

                    TROY Rodriguez MD Unavailable         Unavailable

 

                    TROY Rodriguez MD Unavailable         Unavailable

 

                    TROY Rodriguez MD Unavailable         Unavailable

 

                    TROY Rodriguez MD Unavailable         Unavailable

 

                    Michael, O Samah MD Unavailable         Unavailable

 

                    Michael, O Samah MD Unavailable         Unavailable

 

                    Michael, O Samah MD Unavailable         Unavailable

 

                    Michael, O Samah MD Unavailable         Unavailable

 

                    Michael, O Samah MD Unavailable         Unavailable

 

                    Michael, O Samah MD Unavailable         Unavailable

 

                    Michael, O Samah MD Unavailable         Unavailable

 

                    Michael, O Samah MD Unavailable         Unavailable

 

                    Michael, O Samah MD Unavailable         Unavailable

 

                    Michael, O Samah MD Unavailable         Unavailable

 

                    Michael, O Samah MD Unavailable         Unavailable

 

                    Michael, O Samah MD Unavailable         Unavailable

 

                    Michael, O Samah MD Unavailable         Unavailable



                                  



Re-disclosure Warning

          The records that you are about to access may contain information from 
federally-assisted alcohol or drug abuse programs. If such information is 
present, then the following federally mandated warning applies: This information
has been disclosed to you from records protected by federal confidentiality 
rules (42 CFR part 2). The federal rules prohibit you from making any further 
disclosure of this information unless further disclosure is expressly permitted 
by the written consent of the person to whom it pertains or as otherwise 
permitted by 42 CFR part 2. A general authorization for the release of medical 
or other information is NOT sufficient for this purpose. The Federal rules 
restrict any use of the information to criminally investigate or prosecute any 
alcohol or drug abuse patient.The records that you are about to access may 
contain highly sensitive health information, the redisclosure of which is 
protected by Article 27-F of the [facility] Public Health law. If you 
continue you may have access to information: Regarding HIV / AIDS; Provided by 
facilities licensed or operated by the [facility] Office of Mental Health; 
or Provided by the [facility] Office for People With Developmental 
Disabilities. If such information is present, then the following [place] 
mandated warning applies: This information has been disclosed to you from 
confidential records which are protected by state law. State law prohibits you 
from making any further disclosure of this information without the specific 
written consent of the person to whom it pertains, or as otherwise permitted by 
law. Any unauthorized further disclosure in violation of state law may result in
a fine or alf sentence or both. A general authorization for the release of 
medical or other information is NOT sufficient authorization for further disc
losure.                                                                         
    



Allergies and Adverse Reactions

          



           Type       Description Substance  Reaction   Status     Data Source(s

)

 

           Drug allergy Drug allergy Penicillins                       [facility]

[facility]

 

           Allergy to substance Allergy to substance Penicillin antibiotic produ

ct                       

[facility] ([facility])



                                                                                
                 



Encounters

          



           Encounter  Providers  Location   Date       Indications Data Source(s

)

 

           Outpatient                       2021 12:00:00 AM EDT [facility]

 

                                        consult 

 

                Unlisted evaluation and management service Performer: Hallie Holden                 

2021 09:57:00 PM EDT - 2021 05:10:00 PM EDT                         

  [facility] ([facility])

 

                Unlisted evaluation and management service Performer: Hallie Holden                 

2021 08:36:00 PM EDT                           [facility] ([facility])

 

                Outpatient      Attender: Scout Rodriguez MD Main office - [facility] 07/15/2021 

09:30:00 AM EDT                                     JUAN MIGUEL ([facility], )

 

                                        PIO VargasBC: [address]

[address], [place] [address], Ph. [phone]                  [facility]: [facility]       2021 12:00:00 AM EDT                     JOHNNIE ([facility])

 

                                        MIREYA Vargas: [address]

[address], [place] [address], Ph. [phone]                  Attender: Radha Carrasco [facility]       2021 12:00:00 AM EDT                     JOHNNIE ([facility])

 

                                        MIREYA Vargas: [address]

[address], [place] [address], Ph. [phone]                  Attender: Radha Carrasco [facility]       2021 12:00:00 AM EDT                     JOHNNIE ([facility])

 

                Unlisted evaluation and management service                      

           2021 04:30:00 PM EST - 

2021 10:53:00 PM EST                           [facility] ([facility])

 

           Outpatient Attender: Sammie Markham   2020 02:40:00 PM EST

            [facility] 

([facility])

 

                          Inpatient                 Attender: Angelica PENALOZAtte

nder: ANGELICA JAMES MDAdmitter: ANGELICA JAMES MD                  [facility]  2020 03:42:00 PM EST - 2021 

10:45:00 AM EST 

PSYCHOACTIVE SUBSTANCE DEPENDENCE       [facility]

 

                                        PSYCHOACTIVE SUBSTANCE DEPENDENCE 

 

                                        Patient discharged. 

 

                Unlisted evaluation and management service Performer: Hallie Holden                 

2020 08:54:00 PM EST - 2020 07:20:00 PM EST                         

  [facility] ([facility])



                                                                                
                                                                                
                          



Medications

          



          Medication Brand Name Start Date Product Form Dose      Route     Admi

nistrative 

Instructions Pharmacy Instructions Status     Indications Reaction   Description

 Data 

Source(s)

 

                          Buprenorphine 12 MG / Naloxone 3 MG Oral Strip Bupreno

rphine 

Hydrochloride/Naloxone Hydrochloride 2020 12:00:00 AM EST                 

          SUBLINGUAL    

                      active                                      [facility] ([facility])

 

                    Clonidine Hydrochloride 0.1 MG Oral Tablet Clonidine HCL    

   2020 12:00:00 AM 

EST                                       active                      [facility] ([facility])

 

                    Hydroxyzine Hydrochloride 25 MG Oral Tablet Hydroxyzine HCL 

    2020 12:00:00 

AM EST                                    active                      [facility] ([facility])

 

                                        Buprenorphine 2 MG / Naloxone 0.5 MG Ora

l Strip buprenorphine 2 mg-naloxone 0.5 

mg sublingual film PLACE ONE FILM UNDER THE TONGUE TWICE A DAY  MAXIMUM DAILY 
DOSE   2 FILMS                          buprenorphine 2 mg-naloxone 0.5 mg subli

ngual film PLACE ONE FILM

UNDER THE TONGUE TWICE A DAY  MAXIMUM DAILY DOSE   2 FILMS                      

                                       completed

                                                buprenorphine 2 MG / naloxone 0.

5 MG Sublingual Film JOHNNIE ([facility])

 

                                        Nicotine 4 MG Chewing Gum nicotine (eladio

crilex) 4 mg gum CHEW 1 PIECE OF GUM 

EVERY HOUR AS NEEDED FOR NICOTINE CRAVINGS nicotine (polacrilex) 4 mg gum CHEW 1

PIECE OF GUM EVERY HOUR AS NEEDED FOR NICOTINE CRAVINGS                         

                        completed         

                          nicotine 4 MG Chewing Gum JOHNNIE ([facility])

 

                                        Buprenorphine 2 MG / Naloxone 0.5 MG Ora

l Strip buprenorphine 2 mg-naloxone 0.5 

mg sublingual film PLACE ONE FILM UNDER THE TONGUE TWICE A DAY  MAXIMUM DAILY 
DOSE   2 FILMS                          buprenorphine 2 mg-naloxone 0.5 mg subli

ngual film PLACE ONE FILM

UNDER THE TONGUE TWICE A DAY  MAXIMUM DAILY DOSE   2 FILMS                      

                                       completed

                                                buprenorphine 2 MG / naloxone 0.

5 MG Sublingual Film JOHNNIE ([facility])

 

                                        quetiapine 50 MG Oral Tablet quetiapine 

50 mg tablet TAKE ONE TABLET BY MOUTH AT

BEDTIME quetiapine 50 mg tablet TAKE ONE TABLET BY MOUTH AT BEDTIME             

                                    

completed                                       quetiapine 50 MG Oral Tablet ATH

TOYIN ([facility])

 

                                        Ibuprofen 800 MG Oral Tablet ibuprofen 8

00 mg tablet TAKE ONE TABLET BY MOUTH 

EVERY 6 HOURS AS NEEDED FOR PAIN        ibuprofen 800 mg tablet TAKE ONE TABLET 

BY 

MOUTH EVERY 6 HOURS AS NEEDED FOR PAIN                                          

 completed               ibuprofen 800 

MG Oral Tablet                          JOHNNIE ([facility])

 

                                        Levofloxacin 250 MG Oral Tablet levoflox

acin 250 mg tablet TAKE ONE TABLET BY 

MOUTH DAILY levofloxacin 250 mg tablet TAKE ONE TABLET BY MOUTH DAILY           

                                        

             completed                              levofloxacin 250 MG Oral Tab

let JOHNNIE ([facility])

 

                                        Cephalexin 500 MG Oral Capsule cephalexi

n 500 mg capsule TAKE ONE CAPSULE BY 

MOUTH THREE TIMES A DAY FOR 10 DAYS     cephalexin 500 mg capsule TAKE ONE CAPSU

LE 

BY MOUTH THREE TIMES A DAY FOR 10 DAYS                                          

 completed               cephalexin 500 

MG Oral Capsule                         JOHNNIE ([facility])

 

                                        Buprenorphine 4 MG / Naloxone 1 MG Oral 

Strip buprenorphine 4 mg-naloxone 1 mg 

sublingual film PLACE ONE FILM UNDER THE TONGUE TWICE A DAY  MAXIMUM DAILY DOSE 
 2 FILMS                                buprenorphine 4 mg-naloxone 1 mg subling

ual film PLACE ONE FILM UNDER 

THE TONGUE TWICE A DAY  MAXIMUM DAILY DOSE   2 FILMS                            

               completed               

buprenorphine 4 MG / naloxone 1 MG Sublingual Film [place] ([facility])

 

        Tab-A-Violeta 400 mcg tablet TAKE ONE TABLET BY MOUTH EVERY DAY [id number]     

                                             

completed                                       Tab-A-Violeta 400 mcg tablet [place]

 ([facility])

 

                                        Cephalexin 500 MG Oral Capsule cephalexi

n 500 mg capsule TAKE ONE CAPSULE BY 

MOUTH THREE TIMES A DAY FOR 10 DAYS     cephalexin 500 mg capsule TAKE ONE CAPSU

LE 

BY MOUTH THREE TIMES A DAY FOR 10 DAYS                                          

 completed               cephalexin 500 

MG Oral Capsule                         JOHNNIE ([facility])

 

                                        Clotrimazole 10 MG/ML Topical Cream clot

rimazole 1 % topical cream APPLY 

TOPICALLY TO FEET TWO TIMES A DAY       clotrimazole 1 % topical cream APPLY TOP

ICALLY

TO FEET TWO TIMES A DAY                                           completed     

          clotrimazole 10 MG/ML Topical 

Cream                                   JOHNNIE ([facility])

 

                                        Buprenorphine 4 MG / Naloxone 1 MG Oral 

Strip buprenorphine 4 mg-naloxone 1 mg 

sublingual film PLACE ONE FILM UNDER THE TONGUE TWICE A DAY  MAXIMUM DAILY DOSE 
 2 FILMS                                buprenorphine 4 mg-naloxone 1 mg subling

ual film PLACE ONE FILM UNDER 

THE TONGUE TWICE A DAY  MAXIMUM DAILY DOSE   2 FILMS                            

               completed               

buprenorphine 4 MG / naloxone 1 MG Sublingual Film [place] ([facility])

 

                                        Doxycycline Monohydrate 100 MG Oral Caps

ule doxycycline monohydrate 100 mg 

capsule TAKE ONE CAPSULE BY MOUTH EVERY 12 HOURS doxycycline monohydrate 100 mg 

capsule TAKE ONE CAPSULE BY MOUTH EVERY 12 HOURS                                

           completed               

doxycycline monohydrate 100 MG Oral Capsule [place] ([facility])

 

        Tab-A-Violeta 400 mcg tablet TAKE ONE TABLET BY MOUTH EVERY DAY [id number]     

                                             

completed                                       Tab-A-Violeta 400 mcg tablet [place]

 ([facility])

 

                                        buspirone hydrochloride 5 MG Oral Tablet

 buspirone 5 mg tablet TAKE ONE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  Insufficient   

ng/mL: Sufficient  >100 ng/mL:   Toxicity possible 









                    ID                  Date                Data Source

 

                    [id number] 2020 10:54:00 AM [facility]      Value     Range     Interpretation Code Description Data [place]

rce(s) Supporting 

Document(s)

 

           Syphilis Serology            Nonreactive Normal (applies to non-numer

ic results)            [facility]                        









                    ID                  Date                Data Source

 

                    [id number] 2020 10:01:00 AM EST [facility]









          Name      Value     Range     Interpretation Code Description Data [place]

rce(s) Supporting 

Document(s)

 

           Sodium     139 mmol/L 137-145    Normal (applies to non-numeric resul

ts)            [facility]                         

 

           Potassium             3.5-5.1    Normal (applies to non-numeric resul

ts)            [facility]                                 

 

           Chloride   106 mmol/L      Normal (applies to non-numeric resul

ts)            [facility]                         

 

           Carbon Dioxide CO2            22.0-33.0  Normal (applies to non-numer

ic results)            [facility]                         

 

           Anion Gap             4.0-11.0   Normal (applies to non-numeric resul

ts)            [facility]                                 

 

           BUN        17 mg/dL   9-20       Normal (applies to non-numeric resul

ts)            [facility]                                 

 

           Creatinine            0.80-1.50  Normal (applies to non-numeric resul

ts)            [facility]                                 

 

           GFR        88 mL/min  >60        Normal (applies to non-numeric resul

ts)            [facility]                                 

 

                                        Result based on MDRD formula. 

 

          Glucose Level 110 mg/dL 74-99     Above high normal           [facility]  

 

                                        The reference range is only applicable w

hen fasting. 

 

           Calcium-Uncorrected            8.4-10.2   Normal (applies to non-nume

nikki results)            [facility]                         

 

           Corrected Calcium            8.4-10.2   Normal (applies to non-numeri

c results)            [facility]                         

 

           Bilirubin,Total            0.2-1.3    Normal (applies to non-numeric 

results)            [facility]                         

 

           Bilirubin,Direct            0.0-0.3    Normal (applies to non-numeric

 results)            [facility]                         

 

          SGOT(AST) 79 U/L    17-59     Above high normal           [facility]  

 

           SGPT(ALT)  51 U/L     21-72      Normal (applies to non-numeric resul

ts)            [facility]                                 

 

           Alkaline Phosphatase 71 U/L          Normal (applies to non-num

brigette results)            

[facility]                  

 

                                        Pregnancy can increase Alkaline Phosp le

vels up to 2 times the normal adult 

value.      Normal values for children and adolescents are 2 to 3 times the 
normal adult value. 

 

          CPK       695 U/L       Above high normal           [facility]  

 

           Total Protein            6.3-8.2    Normal (applies to non-numeric re

sults)            [facility]                                

 

           Albumin               3.5-5.0    Normal (applies to non-numeric resul

ts)            [facility]                                 

 

                Thyroid Stimulate Hormone TSH                 0.358-3.740     No

rmal (applies to non-numeric 

results)                                [facility]  









                    ID                  Date                Data Source

 

                    [id number] 2020 10:01:00 AM [facility]









          Name      Value     Range     Interpretation Code Description Data [place]

rce(s) Supporting 

Document(s)

 

           Magnesium             1.80-2.40  Normal (applies to non-numeric resul

ts)            [facility]                                 









                    ID                  Date                Data Source

 

                    [id number] 2020 10:01:00 AM [facility]









          Name      Value     Range     Interpretation Code Description Data [place]

rce(s) Supporting 

Document(s)

 

           C-Reactive Protein,Wide Range            <3.00      Above high normal

            [facility]                                 









                    ID                  Date                Data Source

 

                    [id number] 2020 09:31:00 AM [facility]









          Name      Value     Range     Interpretation Code Description Data [place]

rce(s) Supporting 

Document(s)

 

           White Blood Count            4.8-10.8   Normal (applies to non-numeri

c results)            [facility]                         

 

           Red Blood Count            4.35-6.08  Normal (applies to non-numeric 

results)            [facility]                         

 

           Hemoglobin            13.0-17.5  Normal (applies to non-numeric resul

ts)            [facility]                                 

 

           Hematocrit            37.7-51.0  Normal (applies to non-numeric resul

ts)            [facility]                                 

 

           Mean Corpuscular Volume            80-94      Normal (applies to non-

numeric results)            [facility]                        

 

             Mean Corpuscular Hemoglobin              27.0-33.0    Normal (appli

es to non-numeric results) 

                          [facility]    

 

           Mean Corpuscular HGB Conc            32.0-36.0  Normal (applies to no

n-numeric results)            

[facility]                  

 

             Red Cell Distribution Width              11.5-14.5    Normal (appli

es to non-numeric results) 

                          [facility]    

 

           Platelet Count 207 X10 3/uL 130-450    Normal (applies to non-numeric

 results)            

[facility]                  

 

           Mean Platelet Volume            9.6-13.1   Normal (applies to non-num

brigette results)            [facility]                        

 

           Imm Grans% (AUTO) 0 %        0-2        Normal (applies to non-numeri

c results)            [facility]                         

 

           Neutrophils % (AUTO) 52 %       40-75      Normal (applies to non-num

brigette results)            [facility]                        

 

           Lymphocytes % (AUTO) 29 %       21-46      Normal (applies to non-num

brigette results)            [facility]                        

 

           Monocytes % (AUTO) 11 %       5-12       Normal (applies to non-numer

ic results)            [facility]                         

 

          Eosinophils % (AUTO) 7 %       1-5       Above high normal           [facility]  

 

           Basophils % (AUTO) 1 %        0-1        Normal (applies to non-numer

ic results)            [facility]                         

 

           Imm Grans# (AUTO)            0.0-0.5    Normal (applies to non-numeri

c results)            [facility]                         

 

           Neutrophils # (AUTO)            1.5-8.1    Normal (applies to non-num

brigette results)            [facility]                         

 

           Lymphocytes # (AUTO)            1.0-3.1    Normal (applies to non-num

brigette results)            [facility]                         

 

           Monocytes # (AUTO)            0.2-1.3    Normal (applies to non-numer

ic results)            [facility]                         

 

           Eosinophils# (AUTO)            0.0-0.5    Normal (applies to non-nume

nikki results)            [facility]                         

 

           Basophils # (AUTO)            0.0-0.1    Normal (applies to non-numer

ic results)            [facility]                         









                    ID                  Date                Data Source

 

                    [id number] 2020 02:28:00 PM [facility]









          Name      Value     Range     Interpretation Code Description Data [place]

rce(s) Supporting 

Document(s)

 

             Cannabinoids Confirm,Ur result              .            Very abnor

mal (applies to non-numeric units  

                          [facility]    

 

                                        Carboxy THC GC/MS Conf      92          

       ng/mL Cutoff=10        01  

Performed at:  [facility]  [id number]  
: Araceli Reyes MD, Phone:  [phone] 









                    ID                  Date                Data Source

 

                    [id number] 2020 09:06:00 PM EST [facility]









          Name      Value     Range     Interpretation Code Description Data [place]

rce(s) Supporting 

Document(s)

 

           Opiate Screen,Urine            Negative   Normal (applies to non-nume

nikki results)            [facility]                         

 

          Amphetamine Screen,Urine           Negative  Velazquez                  [facility]  

 

                Benzodiazepines Scrn,Ur result                 Negative        N

ormal (applies to non-numeric 

results)                                [facility]  

 

           Cocaine Screen,Urine            Negative   Normal (applies to non-num

brigette results)            [facility]                        

 

           Methadone Screen,Urine            Negative   Normal (applies to non-n

umeric results)            

[facility]                  

 

          Cannabinoid Screen, Ur           Negative  Ha                  [facility]  

 

                                        Therapeutic Drug Ranges for Emergency an

d Rehabilitation                        

      Threshold Levels (ng/mL)  Cocaine                           300  Opiates  
                         300  Cannabinoids                       50  
Barbiturates                      200  Benzodiazepine                    200  
Methadone                         300  Amphetamines                     1000    
All positive findings are presumptive and unconfirmed.    Confirmation of 
positive results are performed only at request of provider.  Unconfirmed results
 must not be used for non-medical purposes (i.e. pre-employment and legal 
purposes) 









                    ID                  Date                Data Source

 

                    [id number]   2020 06:32:00 PM EST [facility]









          Name      Value     Range     Interpretation Code Description Data [place]

rce(s) Supporting 

Document(s)

 

                                        Respiratory specimen severe acute respir

atory syndrome coronavirus 2 

(SARS-CoV-2) RNA                                             [facility]      

 

                                        This lab was ordered by [facility]

oj and reported by [facility]. 









                    ID                  Date                Data Source

 

                    [id number] 2020 06:18:00 PM [facility]









          Name      Value     Range     Interpretation Code Description Data [place]

rce(s) Supporting 

Document(s)

 

           SARS-CoV-2 RNA            Negative   Normal (applies to non-numeric r

esults)            [facility]                         

 

                                        Negative results should be treated as pr

esumptive and, if inconsistent with 

clinical signs and symptoms or necessary for patient management, should be 
tested with different authorized or cleared molecular tests. Negative results do
 not preclude SARS-CoV-2 infection and should not be used as the sole basis for 
patient management decisions. Negative results should be considered in the 
context of a patients recent exposures, history and the presence of clinical 
signs and symptoms consistent with COVID-19.     This test has not been FDA 
cleared or approved; this test has been authorized by FDA under an Emergency Use
 Authorization for use by laboratories certified under the Clinical Laboratory 
Improvement Amendments of 1988 (CLIA), 42 U.S.C. 263a, to perform moderate 
complexity/high complexity tests and at the Point of Care (POC), i.e., in 
patient care settings operating under a CLIA Certificate of Waiver, Certificate 
of Compliance, or Certificate of Accreditation.    Factsheets for healthcare 
providers:  https://www.fda.gov/media/752859/download  Factsheets for patients: 
 https://www.fda.gov/media/168623/download    The ID NOW Instrument is a rapid 
molecular in vitro diagnostic test utilizing an isothermal nucleic acid 
amplification technology intended for the qualitative detection of nucleic acid 
from the SARS-CoV-2 viral RNA.    THIS IS A STATE REPORTABLE COMMUNICABLE 
DISEASE.    Manual entry verified  by Cherie Hood 20 









                    ID                  Date                Data Source

 

                    [id number] 2020 11:55:00 PM EST [facility]









          Name      Value     Range     Interpretation Code Description Data [place]

rce(s) Supporting 

Document(s)

 

           Color,Urine            Yellow     Normal (applies to non-numeric resu

lts)            [facility]                                 

 

           Clarity,Urine            Clear      Normal (applies to non-numeric re

sults)            [facility]                                 

 

           Specific Gravity,Urine            1.001-1.030 Normal (applies to non-

numeric results)            

[facility]                  

 

           PH,Urine              5.0-8.0    Normal (applies to non-numeric resul

ts)            [facility]                                 

 

           Protein,Urine            Negative   Normal (applies to non-numeric re

sults)            [facility]                         

 

           Glucose,Urine (UA)            Negative   Normal (applies to non-numer

ic results)            [facility]                         

 

          Ketones,Urine           Negative  [facility]

ospital  

 

           Blood,Urine            Negative   Normal (applies to non-numeric resu

lts)            [facility]                                 

 

           Bilirubin,Urine            Negative   Normal (applies to non-numeric 

results)            [facility]                         

 

           Urobilinogen,Urine            Norm 0.2-1 Normal (applies to non-numer

ic results)            [facility]                        

 

           Leukocyte Esterase,Urine            Negative   Normal (applies to non

-numeric results)            

[facility]                  

 

           Nitrite,Urine            Negative   Normal (applies to non-numeric re

sults)            [facility]                         







                                        Procedure

 

                                          



                                                                                
                                                                                
                                                                                
                                                                              



Social History

          No Information                                                        
                                



Vital Signs

          



                    ID                  Date                Data Source

 

                    UNK                                      









           Name       Value      Range      Interpretation Code Description Data

 Source(s)

 

           Systolic blood pressure 104.0 MM[HG]                       104.0 MM[H

G] NETSMART ([facility])

 

           Diastolic blood pressure 61.0 MM[HG]                       61.0 MM[HG

] NETSMART ([facility])

 

           Heart rate 94.0 /MIN                        94.0 /MIN  [facility] ([facility]

 

           Respiratory rate 16.0 /MIN                        16.0 /MIN  NETSMART

 ([facility])

 

           Systolic blood pressure 117.0 MM[HG]                       117.0 MM[H

G] NETSMART ([facility])

 

           Body temperature 97.7 [DEGF]                       97.7 [DEGF] NETSMA

RT ([facility])

 

           Body temperature 36.5 LACEY                         36.5 LACEY   NETSMART

 ([facility])

 

           Diastolic blood pressure 62.0 MM[HG]                       62.0 MM[HG

] NETSMART ([facility])

 

           Heart rate 64.0 /MIN                        64.0 /MIN  [facility]SMART ([facility]

 

           Body temperature 97.8 [DEGF]                       97.8 [DEGF] NETSMA

RT ([facility])

 

           Body temperature 36.6 LACEY                         36.6 LACEY   NETSMART

 ([facility])

 

           Heart rate 90.0 /MIN                        90.0 /MIN  [facility]SM[facility] ([facility]

 

           Respiratory rate 16.0 /MIN                        16.0 /MIN  NETSMART

 ([facility])

 

           Systolic blood pressure 110.0 MM[HG]                       110.0 MM[H

G] NETSMART ([facility])

 

           Diastolic blood pressure 66.0 MM[HG]                       66.0 MM[HG

] NETSMART ([facility])

 

           Body temperature 97.3 [DEGF]                       97.3 [DEGF] NETSMA

RT ([facility])

 

           Body temperature 36.3 LACEY                         36.3 LACEY   NETSMART

 ([facility])

 

           Systolic blood pressure 118.0 MM[HG]                       118.0 MM[H

G] NETSMART ([facility])

 

           Heart rate 64.0 /MIN                        64.0 /MIN  [facility]SMART ([facility]

 

           Respiratory rate 16.0 /MIN                        16.0 /MIN  NETSMART

 ([facility])

 

           Diastolic blood pressure 69.0 MM[HG]                       69.0 MM[HG

] NETSMART ([facility])

 

           Heart rate 86.0 /MIN                        86.0 /MIN  [facility]SM[facility] ([facility]

 

           Systolic blood pressure 110.0 MM[HG]                       110.0 MM[H

G] NETSMART ([facility])

 

           Diastolic blood pressure 69.0 MM[HG]                       69.0 MM[HG

] NETSMART ([facility])

 

           Respiratory rate 17.0 /MIN                        17.0 /MIN  NETSMART

 ([facility])

 

           Respiratory rate 16.0 /MIN                        16.0 /MIN  NETSMART

 ([facility])

 

           Systolic blood pressure 120.0 MM[HG]                       120.0 MM[H

G] NETSMART ([facility])

 

           Diastolic blood pressure 64.0 MM[HG]                       64.0 MM[HG

] NETSMART ([facility])

 

           Heart rate 71.0 /MIN                        71.0 /MIN  [facility]SM[facility] ([facility]

 

           Body temperature 97.5 [DEGF]                       97.5 [DEGF] NETSMA

RT ([facility])

 

           Body temperature 36.4 LACEY                         36.4 LACEY   NETSMART

 ([facility])

 

           Heart rate 74.0 /MIN                        74.0 /MIN  [facility] ([facility]

 

           Body temperature 98.7 [DEGF]                       98.7 [DEGF] NETSMA

RT ([facility])

 

           Diastolic blood pressure 71.0 MM[HG]                       71.0 MM[HG

] NETSMART ([facility])

 

           Body height 182.9 cm                         182.9 cm   [facility] ([facility]

 

           Body temperature 37.1 LACEY                         37.1 LACEY   NETSMART

 ([facility])

 

           Body weight 63.6 KG                          63.6 KG    [facility] ([facility])

 

           Body mass index (BMI) [Ratio] 19.0                             19.0  

     NETSMART ([facility])

 

           Systolic blood pressure 131.0 MM[HG]                       131.0 MM[H

G] NETSMART ([facility])

 

           Respiratory rate 16.0 /MIN                        16.0 /MIN  NETSMART

 ([facility])

 

           Oxygen saturation in Arterial blood by Pulse oximetry 100.0 %        

                  100.0 %    

NETSMART ([facility])

 

           Ideal body weight 178 [lb_av]                       178 [lb_av] MEDEN

T ([facility], 

)

 

           Respiratory rate 12 /min                          12 /min    MEDENT (

[facility], )

 

           Body height 72 [in_i]                        72 [in_i]  MEDENT ([facility] )

 

                                        6'0" 

 

           Body weight 145.00 [lb_av]                       145.00 [lb_av] DESI

T ([facility] 

[facility])

 

           Body mass index (BMI) [Ratio] 19.7 kg/m2                       19.7 k

g/m2 MEDENT ([facility])

 

           Body height 72 [in_i]                        72 [in_i]  JOHNNIE ([facility])

 

           Diastolic blood pressure 62 mm[Hg]                        62 mm[Hg]  

JOHNNIE ([facility])

 

           Body height 72 [in_i]                        72 [in_i]  JOHNNIE ([facility])

 

           Body mass index (BMI) [Ratio] 19.4 kg/m2                       19.4 k

g/m2 JOHNNIE ([facility])

 

           Systolic blood pressure 98 mm[Hg]                        98 mm[Hg]  A

MIKEA ([facility])

 

           Body weight 2290 [oz_av]                       2290 [oz_av] JOHNNIE ([facility])

 

           Diastolic blood pressure 62 mm[Hg]                        62 mm[Hg]  

JOHNNIE ([facility])

 

           Body height 72 [in_i]                        72 [in_i]  JOHNNIE ([facility])

 

           Body mass index (BMI) [Ratio] 19.4 kg/m2                       19.4 k

g/m2 JOHNNIE ([facility])

 

           Systolic blood pressure 98 mm[Hg]                        98 mm[Hg]  A

THENA ([facility])

 

           Body weight 2290 [oz_av]                       2290 [oz_av] JOHNNIE ([facility])

 

           Body temperature 36.4 LACEY                         36.4 LACEY   NETSMART

 ([facility])

 

           Heart rate 72.0 /MIN                        72.0 /MIN  NETSMART ([facility])

 

           Respiratory rate 14.0 /MIN                        14.0 /MIN  NETSMART

 ([facility])

 

           Systolic blood pressure 122.0 MM[HG]                       122.0 MM[H

G] NETSMART ([facility])

 

           Diastolic blood pressure 68.0 MM[HG]                       68.0 MM[HG

] NETSMART ([facility])

 

           Body temperature 97.6 [DEGF]                       97.6 [DEGF] NETSMA

RT ([facility])

 

           Body temperature 36.6 LACEY                         36.6 LACEY   NETSMART

 ([facility])

 

           Heart rate 92.0 /MIN                        92.0 /MIN  [facility] ([facility]

 

           Respiratory rate 17.0 /MIN                        17.0 /MIN  NETSMART

 ([facility])

 

           Systolic blood pressure 114.0 MM[HG]                       114.0 MM[H

G] NETSMART ([facility])

 

           Diastolic blood pressure 74.0 MM[HG]                       74.0 MM[HG

] NETSMART ([facility])

 

           Body temperature 97.8 [DEGF]                       97.8 [DEGF] NETSMA

RT ([facility])

 

           Body temperature 98.2 [DEGF]                       98.2 [DEGF] NETSMA

RT ([facility])

 

           Body temperature 36.8 LACEY                         36.8 LACEY   NETSMART

 ([facility])

 

           Respiratory rate 16.0 /MIN                        16.0 /MIN  NETSMART

 ([facility])

 

           Systolic blood pressure 119.0 MM[HG]                       119.0 MM[H

G] NETSMART ([facility])

 

           Diastolic blood pressure 68.0 MM[HG]                       68.0 MM[HG

] NETSMART ([facility])

 

           Heart rate 79.0 /MIN                        79.0 /MIN  [facility] ([facility]

 

           Body temperature 97.5 [DEGF]                       97.5 [DEGF] NETSMA

RT ([facility])

 

           Body temperature 36.4 LACEY                         36.4 LACEY   NETSMART

 ([facility])

 

           Heart rate 64.0 /MIN                        64.0 /MIN  [facility] ([facility]

 

           Respiratory rate 16.0 /MIN                        16.0 /MIN  NETSMART

 ([facility])

 

           Systolic blood pressure 107.0 MM[HG]                       107.0 MM[H

G] NETSMART ([facility])

 

           Diastolic blood pressure 60.0 MM[HG]                       60.0 MM[HG

] NETSMART ([facility])

 

           Respiratory rate 16.0 /MIN                        16.0 /MIN  NETSMART

 ([facility])

 

           Systolic blood pressure 113.0 MM[HG]                       113.0 MM[H

G] NETSMART ([facility])

 

           Diastolic blood pressure 63.0 MM[HG]                       63.0 MM[HG

] NETSMART ([facility])

 

           Heart rate 53.0 /MIN                        53.0 /MIN  [facility] ([facility]

 

           Systolic blood pressure 103.0 MM[HG]                       103.0 MM[H

G] NETSMART ([facility])

 

           Body temperature 98.7 [DEGF]                       98.7 [DEGF] NETSMA

RT ([facility])

 

           Body temperature 37.1 LACEY                         37.1 LACEY   NETSMART

 ([facility])

 

           Diastolic blood pressure 62.0 MM[HG]                       62.0 MM[HG

] NETSMART ([facility])

 

           Heart rate 74.0 /MIN                        74.0 /MIN  [facility] ([facility]

 

           Respiratory rate 17.0 /MIN                        17.0 /MIN  NETSMART

 ([facility])

 

           Body temperature 98.7 [DEGF]                       98.7 [DEGF] NETSMA

RT ([facility])

 

           Heart rate 74.0 /MIN                        74.0 /MIN  [facility] ([facility]

 

           Systolic blood pressure 103.0 MM[HG]                       103.0 MM[H

G] NETSMART ([facility])

 

           Respiratory rate 17.0 /MIN                        17.0 /MIN  NETSMART

 ([facility])

 

           Body temperature 37.1 LACEY                         37.1 LACEY   NETSMART

 ([facility])

 

           Diastolic blood pressure 62.0 MM[HG]                       62.0 MM[HG

] NETSMART ([facility])

 

           Heart rate 73.0 /MIN                        73.0 /MIN  [facility] ([facility]

 

           Respiratory rate 16.0 /MIN                        16.0 /MIN  NETSMART

 ([facility])

 

           Systolic blood pressure 107.0 MM[HG]                       107.0 MM[H

G] NETSMART ([facility])

 

           Body temperature 97.7 [DEGF]                       97.7 [DEGF] NETSMA

RT ([facility])

 

           Diastolic blood pressure 61.0 MM[HG]                       61.0 MM[HG

] NETSMART ([facility])

 

           Body temperature 36.5 LACEY                         36.5 LACEY   NETSMART

 ([facility])

 

           Heart rate 16.0 /MIN                        16.0 /MIN  [facility] ([facility]

 

           Body temperature 98.7 [DEGF]                       98.7 [DEGF] NETSMA

RT ([facility])

 

           Respiratory rate 16.0 /MIN                        16.0 /MIN  NETSMART

 ([facility])

 

           Systolic blood pressure 136.0 MM[HG]                       136.0 MM[H

G] NETSMART ([facility])

 

           Diastolic blood pressure 76.0 MM[HG]                       76.0 MM[HG

] NETSMART ([facility])

 

           Body temperature 37.1 LACEY                         37.1 LACEY   NETSMART

 ([facility])

 

           Respiratory rate 16.0 /MIN                        16.0 /MIN  NETSMART

 ([facility])

 

           Systolic blood pressure 112.0 MM[HG]                       112.0 MM[H

G] NETSMART ([facility])

 

           Body temperature 97.8 [DEGF]                       97.8 [DEGF] NETSMA

RT ([facility])

 

           Body temperature 36.6 LACEY                         36.6 LACEY   NETSMART

 ([facility])

 

           Heart rate 82.0 /MIN                        82.0 /MIN  [facility] ([facility]

 

           Diastolic blood pressure 70.0 MM[HG]                       70.0 MM[HG

] NETSMART ([facility])

 

           Body temperature 36.8 LACEY                         36.8 LACEY   NETSMART

 ([facility])

 

           Heart rate 87.0 /MIN                        87.0 /MIN  [facility] ([facility]

 

           Respiratory rate 18.0 /MIN                        18.0 /MIN  NETSMART

 ([facility])

 

           Systolic blood pressure 115.0 MM[HG]                       115.0 MM[H

G] NETSMART ([facility])

 

           Diastolic blood pressure 75.0 MM[HG]                       75.0 MM[HG

] NETSMART ([facility])

 

           Body temperature 98.2 [DEGF]                       98.2 [DEGF] NETSMA

RT ([facility])

 

           Body temperature 36.6 LACEY                         36.6 LACEY   NETSMART

 ([facility])

 

           Heart rate 91.0 /MIN                        91.0 /MIN  [facility] ([facility]

 

           Respiratory rate 16.0 /MIN                        16.0 /MIN  NETSMART

 ([facility])

 

           Body temperature 97.8 [DEGF]                       97.8 [DEGF] NETSMA

RT ([facility])

 

           Systolic blood pressure 107.0 MM[HG]                       107.0 MM[H

G] NETSMART ([facility])

 

           Diastolic blood pressure 62.0 MM[HG]                       62.0 MM[HG

] NETSMART ([facility])

 

           Oxygen saturation in Arterial blood by Pulse oximetry 99.0 %         

                  99.0 %     NETSMART

 ([facility])









                    ID                  Date                Data Source

 

                    [id number]           2021 09:35:00 AM EST [facility]









           Name       Value      Range      Interpretation Code Description Data

 Source(s)

 

           Weight Measurement Method 1                                1         

 [facility]

 

           Weight (Calculated Kilograms) 59.42                            59.42 

     [facility]

 

           Weight     7796                             2096       [facility]

 

           Temperature Source 7                                7          [facility]

 

           Temperature 97.6                             97.6       [facility]

 

           Respiratory Effort 1                                1          [facility]

 

           Respiratory Rate 16                               16         [facility]

 

           Pulse Assessment Method 4                                4          [facility]

 

           Pulse Rate 66                               66         [facility]

 

           Height (Calculated Centimeters) 180.34                           180.

34     [facility]

 

           Height     71                               71         [facility]

 

           Blood Pressure 130/55                           130/55     [facility]

 

           Body Mass Index (BMI) 18.2                             18.2       [facility]

 

           Weight Measurement Method 1                                1         

 [facility]

 

           Weight (Calculated Kilograms) 59.42                            59.42 

     [facility]

 

           Weight     2096       [facility]

 

           Temperature Source 7                                7          [facility]

 

           Temperature 97.6                             97.6       [facility]

 

           Respiratory Effort 1                                1          [facility]

 

           Respiratory Rate 16                               16         [facility]

 

           Pulse Assessment Method 4                                4          [facility]

 

           Pulse Rate 66                               66         [facility]

 

           Height (Calculated Centimeters) 180.34                           180.

34     [facility]

 

           Height     71                               71         [facility]

 

           Blood Pressure 130/55                           130/55     [facility]

 

           Body Mass Index (BMI) 18.2                             18.2       [facility]

 

           Weight Measurement Method 1                                1         

 [facility]

 

           Weight (Calculated Kilograms) 59.42                            59.42 

     [facility]

 

           Weight     2096       [facility]

 

           Temperature Source 7                                7          [facility]

 

           Temperature 97.6                             97.6       [facility]

 

           Respiratory Effort 1                                1          [facility]

 

           Respiratory Rate 16                               16         [facility]

 

           Pulse Assessment Method 4                                4          [facility]

 

           Pulse Rate 66                               66         [facility]

 

           Height (Calculated Centimeters) 180.34                           180.

34     [facility]

 

           Height     71                               71         [facility]

 

           Blood Pressure 130/55                           130/55     [facility]

 

           Body Mass Index (BMI) 18.2                             18.2       [facility]

 

           Weight Measurement Method 1                                1         

 [facility]

 

           Weight (Calculated Kilograms) 59.42                            59.42 

     [facility]

 

           Weight     2096       [facility]

 

           Temperature Source 7                                7          [facility]

 

           Temperature 96.7                             96.7       [facility]

 

           Respiratory Effort 1                                1          [facility]

 

           Respiratory Rate 16                               16         [facility]

 

           Pulse Assessment Method 4                                4          [facility]

 

           Pulse Rate 49                               49         [facility]

 

           Height (Calculated Centimeters) 180.34                           180.

34     [facility]

 

           Height     71                               71         [facility]

 

           Blood Pressure 96/56                            96/56      [facility]

 

           Body Mass Index (BMI) 18.2                             18.2       [facility]

 

           Weight Measurement Method 1                                1         

 [facility]

 

           Weight (Calculated Kilograms) 59.42                            59.42 

     [facility]

 

           Weight     2096                             2096       [facility]

 

           Temperature Source 7                                7          [facility]

 

           Temperature 96.7                             96.7       [facility]

 

           Respiratory Effort 1                                1          [facility]

 

           Respiratory Rate 15                               15         [facility]

 

           Pulse Assessment Method 3                                3          [facility]

 

           Pulse Rate 72                               72         [facility]

 

           Height (Calculated Centimeters) 180.34                           180.

34     [facility]

 

           Height     71                               71         [facility]

 

           Blood Pressure 110/68                           110/68     [facility]

 

           Body Mass Index (BMI) 18.2                             18.2       [facility]

 

           Weight Measurement Method 1                                1         

 [facility]

 

           Weight (Calculated Kilograms) 59.42                            59.42 

     [facility]

 

           Weight     2096                             2096       [facility]

 

           Temperature Source 7                                7          [facility]

 

           Temperature 98.0                             98.0       [facility]

 

           Respiratory Effort 1                                1          [facility]

 

           Respiratory Rate 16                               16         [facility]

 

           Pulse Assessment Method 4                                4          [facility]

 

           Pulse Rate 76                               76         [facility]

 

           Height (Calculated Centimeters) 180.34                           180.

34     [facility]

 

           Height     71                               71         [facility]

 

           Blood Pressure 128/60                           128/60     [facility]

 

           Body Mass Index (BMI) 18.2                             18.2       [facility]



                                                                                
                            



Patient Treatment Plan of Care

          



             Planned Activity Planned Date Details      Description  Data Source

(s)

 

             Tab-A-Violeta 400 mcg tablet TAKE ONE TABLET BY MOUTH EVERY DAY       

                                 JOHNNIE ([facility])

 

             Sulfamethoxazole 800 MG / Trimethoprim 160 MG Oral Tablet          

                              [place] ([facility])

 

             quetiapine 50 MG Oral Tablet                                       

 [place] ([facility])

 

             Nicotine 4 MG Chewing Gum                                        AT

[facility] ([facility])

 

                                        Narcan 4 mg/actuation nasal spray SPRAY 

2 SPRAYS  4MG  IN EACH NOSTRIL AS 

DIRECTED FOR SUSPECTED OPOID OVERDOSE AND CALL 911                              

                   JOHNNIE ([facility])

 

             methylprednisolone 4 mg tablets in a dose pack USE AS DIRECTED     

                                   [facility]NNIE 

([facility])

 

             Levofloxacin 250 MG Oral Tablet                                    

    [facility]IE ([facility])

 

             Ibuprofen 800 MG Oral Tablet                                       

 [facility] ([facility])

 

             Hydroxyzine Hydrochloride 25 MG Oral Tablet                        

                [facility]IE ([facility])

 

             Doxycycline Monohydrate 100 MG Oral Capsule                        

                [facility]IE ([facility])

 

             Clotrimazole 10 MG/ML Topical Cream                                

        [facility] ([facility])

 

             Cholecalciferol 1000 UNT Oral Tablet                               

         [facility] ([facility])

 

             Cephalexin 500 MG Oral Capsule                                     

   [facility] ([facility])

 

             buspirone hydrochloride 5 MG Oral Tablet                           

             [facility] ([facility])

 

             Buprenorphine 8 MG / Naloxone 2 MG Oral Strip                      

                  [facility] ([facility])

 

             Buprenorphine 4 MG / Naloxone 1 MG Oral Strip                      

                  [facility] ([facility])

 

             Buprenorphine 2 MG / Naloxone 0.5 MG Oral Strip                    

                    [facility]NNIE ([facility])

 

             Tab-A-Violeta 400 mcg tablet TAKE ONE TABLET BY MOUTH EVERY DAY       

                                 [facility]NNIE ([facility])

 

             quetiapine 50 MG Oral Tablet                                       

 [facility] ([facility])

 

             Nicotine 4 MG Chewing Gum                                        AT

[facility])

 

                                        Narcan 4 mg/actuation nasal spray SPRAY 

2 SPRAYS  4MG  IN EACH NOSTRIL AS 

DIRECTED FOR SUSPECTED OPOID OVERDOSE AND CALL 911                              

                   [facility]IE ([facility])

 

             methylprednisolone 4 mg tablets in a dose pack USE AS DIRECTED     

                                   [facility] 

([facility])

 

             Ibuprofen 800 MG Oral Tablet                                       

 [facility] ([facility])

 

             Hydroxyzine Hydrochloride 25 MG Oral Tablet                        

                [facility] ([facility])

 

             Doxycycline Monohydrate 100 MG Oral Capsule                        

                [facility]IE ([facility])

 

             Clotrimazole 10 MG/ML Topical Cream                                

        [facility] ([facility])

 

             Cholecalciferol 1000 UNT Oral Tablet                               

         [facility] ([facility])

 

             Cephalexin 500 MG Oral Capsule                                     

   [facility] ([facility])

 

             buspirone hydrochloride 5 MG Oral Tablet                           

             JOHNNIE ([facility])

 

             Buprenorphine 8 MG / Naloxone 2 MG Oral Strip                      

                  JOHNNIE ([facility])

 

             Buprenorphine 4 MG / Naloxone 1 MG Oral Strip                      

                  JOHNNIE ([facility])

 

             Buprenorphine 2 MG / Naloxone 0.5 MG Oral Strip                    

                    JOHNNIE ([facility])

## 2021-11-20 NOTE — ECGEPIP
[facility] - ED

                                       

                                       Test Date:    2021

Pat Name:     JUWAN HARO     Department:   

Patient ID:   [id number]                 Room:         -

Gender:       Male                     Technician:   ER

:          1995               Requested By: KRISTINA GALVAN

Order Number: [id number]     Reading MD:   Amy Srivastava

                                 Measurements

Intervals                              Axis          

Rate:         105                      P:            72

OH:           152                      QRS:          87

QRSD:         90                       T:            61

QT:           342                                    

QTc:          452                                    

                           Interpretive Statements

Sinus tachycardia

Electronically Signed on 2021 7:45:51 EST by Amy Srivastava

## 2021-12-01 ENCOUNTER — HOSPITAL ENCOUNTER (EMERGENCY)
Dept: HOSPITAL 53 - M ED | Age: 26
Discharge: HOME | End: 2021-12-01
Payer: COMMERCIAL

## 2021-12-01 VITALS — WEIGHT: 136.47 LBS | BODY MASS INDEX: 18.48 KG/M2 | HEIGHT: 72 IN

## 2021-12-01 VITALS — DIASTOLIC BLOOD PRESSURE: 61 MMHG | SYSTOLIC BLOOD PRESSURE: 122 MMHG

## 2021-12-01 DIAGNOSIS — Z88.0: ICD-10-CM

## 2021-12-01 DIAGNOSIS — F19.20: Primary | ICD-10-CM

## 2021-12-01 DIAGNOSIS — F17.200: ICD-10-CM

## 2021-12-03 ENCOUNTER — HOSPITAL ENCOUNTER (EMERGENCY)
Dept: HOSPITAL 53 - M ED | Age: 26
Discharge: HOME | End: 2021-12-03
Payer: COMMERCIAL

## 2021-12-03 VITALS — HEIGHT: 72 IN | WEIGHT: 138.01 LBS | BODY MASS INDEX: 18.69 KG/M2

## 2021-12-03 VITALS — SYSTOLIC BLOOD PRESSURE: 131 MMHG | DIASTOLIC BLOOD PRESSURE: 61 MMHG

## 2021-12-03 DIAGNOSIS — Z88.0: ICD-10-CM
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
## 2021-12-03 DIAGNOSIS — S91.021A: Primary | ICD-10-CM

## 2021-12-03 DIAGNOSIS — F19.10: ICD-10-CM
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
## 2021-12-03 NOTE — CCD
Summarization Of Episode

                             Created on: 2021



JUWAN WHITMORE

External Reference #: [id number]

: 1995

Sex: Undifferentiated



Demographics





                          Address                   [address] [place]  [address]

 

                          Home Phone                [phone]

 

                          Preferred Language        English

 

                          Marital Status            Unknown

 

                          Jain Affiliation     Unknown

 

                          Race                      Unknown

 

                          Ethnic Group              YES





Author





                          Author                    [facility]

 

                          Organization              [facility]

 

                          Address                   Unknown

 

                          Phone                     Unavailable







Support





                Name            Relationship    Address         Phone

 

                UE              Next Of Kin     Unknown         Unavailable

 

                    CN [employer]    Next Of Kin         UNKNOWN

[place]  [address]                    [phone]

 

                    ARTURO HARO    Next Of Kin         -

                          -, -  -                   [phone]

 

                Brenda Haro   Next Of Kin     Unknown         Unavailable

 

                    NURSE, DUTY ON      Next Of Kin         [address]

[address]

[address]

[place]  [address]                   Unavailable

 

                    Danilo FNP,  Radha Next Of Kin         [address] [place]  [address]                    [phone]

 

                    ARTURO HARO    ECON                -

                          -, -  -                   [phone]







Care Team Providers





                    Care Team Member Name Role                Phone

 

                    Danilo, A Radha FNP Unavailable         Unavailable

 

                    Dainlo, A Radha FNP Unavailable         Unavailable

 

                    Danilo, A Radha FNP Unavailable         Unavailable

 

                    Danilo, A Radha FNP Unavailable         Unavailable

 

                    Danilo, A Radha FNP Unavailable         Unavailable

 

                    Danilo, A Radha FNP Unavailable         Unavailable

 

                    Danilo, A Radha FNP Unavailable         Unavailable

 

                    Danilo, A Radha FNP Unavailable         Unavailable

 

                    Danilo, A Radha FNP Unavailable         Unavailable

 

                    Danilo, A Radha FNP Unavailable         Unavailable

 

                    Danilo, A Radha FNP Unavailable         Unavailable

 

                    Danilo, A Radha FNP Unavailable         Unavailable

 

                    Danilo, A Radha FNP Unavailable         Unavailable

 

                    Danilo, A Radha FNP Unavailable         Unavailable

 

                    Danilo, A Radha FNP Unavailable         Unavailable

 

                    Danilo, A Radha FNP Unavailable         Unavailable

 

                    Danilo, A Radha FNP Unavailable         Unavailable

 

                    Danilo, A Radha FNP Unavailable         Unavailable

 

                    Danilo, A Radha FNP Unavailable         Unavailable

 

                    Danilo, A Radha FNP Unavailable         Unavailable

 

                    Danilo, A Radha FNP Unavailable         Unavailable

 

                    Danilo, A Radha FNP Unavailable         Unavailable

 

                    Danilo, A Radha FNP Unavailable         Unavailable

 

                    Danilo, A Radha FNP Unavailable         Unavailable

 

                    Danilo, A Radha FNP Unavailable         Unavailable

 

                    Danilo, A Radha FNP Unavailable         Unavailable

 

                    Danilo, A Radha FNP Unavailable         Unavailable

 

                    Danilo, A Radha FNP Unavailable         Unavailable

 

                    Danilo, A Radha FNP Unavailable         Unavailable

 

                    Danilo, A Radha FNP Unavailable         Unavailable

 

                    Danilo, A Radha FNP Unavailable         Unavailable

 

                    ANGELICA JAMES MD    Unavailable         Unavailable

 

                    ANGELICA JAMES MD    Unavailable         Unavailable

 

                    ANGELICA JAMES MD    Unavailable         Unavailable

 

                    ANGELICA JAMES MD    Unavailable         Unavailable

 

                    ANGELICA JAMES MD    Unavailable         Unavailable

 

                    ANGELICA JAMES MD    Unavailable         Unavailable

 

                    ANGELICA JAMES MD    Unavailable         Unavailable

 

                    Jose James MD Unavailable         Unavailable

 

                    Locke, E Sammie       Unavailable         Unavailable

 

                    Locke, E Sammie       Unavailable         Unavailable

 

                    Locke, E Sammie       Unavailable         Unavailable

 

                    Locke, E Sammie       Unavailable         Unavailable

 

                    Locke, E Sammie       Unavailable         Unavailable

 

                    Locke, E Sammie       Unavailable         Unavailable

 

                    Locke, E Sammie       Unavailable         Unavailable

 

                    Locke, E Sammie       Unavailable         Unavailable

 

                    Locke, E Sammie       Unavailable         Unavailable

 

                    Locke, E Sammie       Unavailable         Unavailable

 

                    Locke, E Sammie       Unavailable         Unavailable

 

                    Locke, E Sammie       Unavailable         Unavailable

 

                    Locke, E Sammie       Unavailable         Unavailable

 

                    Locke, E Sammie       Unavailable         Unavailable

 

                    Locke, E Sammie       Unavailable         Unavailable

 

                    Locke, E Sammie       Unavailable         Unavailable

 

                    Locke, E Sammie       Unavailable         Unavailable

 

                    KANDI Hickey   Unavailable         Unavailable

 

                    TROY Rodriguez MD Unavailable         Unavailable

 

                    TROY Rodriguez MD Unavailable         Unavailable

 

                    TROY Rodriguez MD Unavailable         Unavailable

 

                    TROY Rodriguez MD Unavailable         Unavailable

 

                    TROY Rodriguez MD Unavailable         Unavailable

 

                    TROY Rodriguez MD Unavailable         Unavailable

 

                    TROY Rodriguez MD Unavailable         Unavailable

 

                    TROY Rodriguez MD Unavailable         Unavailable

 

                    TROY Rodriguez MD Unavailable         Unavailable

 

                    TROY Rodriguez MD Unavailable         Unavailable

 

                    TROY Rodriguez MD Unavailable         Unavailable

 

                    TROY Rodriguez MD Unavailable         Unavailable

 

                    TROY Rodriguez MD Unavailable         Unavailable

 

                    TROY Rodriguez MD Unavailable         Unavailable

 

                    TROY Rodriguez MD Unavailable         Unavailable

 

                    TROY Rodriguez MD Unavailable         Unavailable

 

                    TROY Rodriguez MD Unavailable         Unavailable

 

                    TROY Rodriguez MD Unavailable         Unavailable

 

                    TROY Rodriguez MD Unavailable         Unavailable

 

                    TROY Rodriguez MD Unavailable         Unavailable

 

                    TROY Rodriguez MD Unavailable         Unavailable

 

                    TROY Rodriguez MD Unavailable         Unavailable

 

                    TROY Rodriguez MD Unavailable         Unavailable

 

                    TROY Rodriguez MD Unavailable         Unavailable

 

                    TROY Rodriguez MD Unavailable         Unavailable

 

                    TROY Rodriguez MD Unavailable         Unavailable

 

                    TROY Rodriguez MD Unavailable         Unavailable

 

                    TROY Rodriguez MD Unavailable         Unavailable

 

                    TROY Rodriguez MD Unavailable         Unavailable

 

                    Michael O Daleah MD Unavailable         Unavailable

 

                    TROY Rodriguez MD Unavailable         Unavailable

 

                    TROY Rodriguez MD Unavailable         Unavailable

 

                    TROY Rodriguez MD Unavailable         Unavailable

 

                    TROY Rodriguez MD Unavailable         Unavailable

 

                    TROY Rodriguez MD Unavailable         Unavailable

 

                    TROY Rodriguez MD Unavailable         Unavailable

 

                    TROY Rodriguez MD Unavailable         Unavailable

 

                    TROY Rodriguez MD Unavailable         Unavailable

 

                    TROY Rodriguez MD Unavailable         Unavailable

 

                    TROY Rodriguez MD Unavailable         Unavailable

 

                    TROY Rodriguez MD Unavailable         Unavailable

 

                    TROY Rodriguez MD Unavailable         Unavailable

 

                    TROY Rodriguez MD Unavailable         Unavailable

 

                    TROY Rodriguez MD Unavailable         Unavailable

 

                    TROY Rodriguez MD Unavailable         Unavailable

 

                    TROY Rodriguez MD Unavailable         Unavailable

 

                    TROY Rodriguez MD Unavailable         Unavailable

 

                    TROY Rodriguez MD Unavailable         Unavailable

 

                    TROY Rodriguez MD Unavailable         Unavailable

 

                    TROY Rodriguez MD Unavailable         Unavailable

 

                    TROY Rodriguez MD Unavailable         Unavailable

 

                    TROY Rodriguez MD Unavailable         Unavailable

 

                    TROY Rodriguez MD Unavailable         Unavailable

 

                    TROY Rodriguez MD Unavailable         Unavailable

 

                    TROY Rodriguez MD Unavailable         Unavailable

 

                    TROY Rodriguez MD Unavailable         Unavailable

 

                    TROY Rodriguez MD Unavailable         Unavailable

 

                    TROY Rodriguez MD Unavailable         Unavailable

 

                    TROY Rodriguez MD Unavailable         Unavailable

 

                    TROY Rodriguez MD Unavailable         Unavailable

 

                    TROY Rodriguez MD Unavailable         Unavailable

 

                    TROY Rodriguez MD Unavailable         Unavailable

 

                    TROY Rodriguez MD Unavailable         Unavailable

 

                    TROY Rodriguez MD Unavailable         Unavailable

 

                    TROY Rodriguez MD Unavailable         Unavailable

 

                    TROY Rodriguez MD Unavailable         Unavailable

 

                    Michael, O Samah MD Unavailable         Unavailable

 

                    Michael, O Samah MD Unavailable         Unavailable

 

                    Michael, O Samah MD Unavailable         Unavailable

 

                    Michael, O Samah MD Unavailable         Unavailable

 

                    Michael, O Samah MD Unavailable         Unavailable

 

                    Michael, O Samah MD Unavailable         Unavailable

 

                    Michael, O Samah MD Unavailable         Unavailable

 

                    Michael, O Samah MD Unavailable         Unavailable

 

                    Michael, O Samah MD Unavailable         Unavailable

 

                    Michael, O Samah MD Unavailable         Unavailable

 

                    Michael, O Samah MD Unavailable         Unavailable

 

                    Michael, O Samah MD Unavailable         Unavailable

 

                    Michael, O Samah MD Unavailable         Unavailable

 

                    Michael, O Samah MD Unavailable         Unavailable



                                  



Re-disclosure Warning

          The records that you are about to access may contain information from 
federally-assisted alcohol or drug abuse programs. If such information is 
present, then the following federally mandated warning applies: This information
has been disclosed to you from records protected by federal confidentiality 
rules (42 CFR part 2). The federal rules prohibit you from making any further 
disclosure of this information unless further disclosure is expressly permitted 
by the written consent of the person to whom it pertains or as otherwise 
permitted by 42 CFR part 2. A general authorization for the release of medical 
or other information is NOT sufficient for this purpose. The Federal rules 
restrict any use of the information to criminally investigate or prosecute any 
alcohol or drug abuse patient.The records that you are about to access may 
contain highly sensitive health information, the redisclosure of which is 
protected by Article 27-F of the [facility] Public Health law. If you 
continue you may have access to information: Regarding HIV / AIDS; Provided by 
facilities licensed or operated by the [facility] Office of Mental Health; 
or Provided by the [facility] Office for People With Developmental 
Disabilities. If such information is present, then the following [place] 
mandated warning applies: This information has been disclosed to you from 
confidential records which are protected by state law. State law prohibits you 
from making any further disclosure of this information without the specific 
written consent of the person to whom it pertains, or as otherwise permitted by 
law. Any unauthorized further disclosure in violation of state law may result in
a fine or senior living sentence or both. A general authorization for the release of 
medical or other information is NOT sufficient authorization for further disc
losure.                                                                         
    



Allergies and Adverse Reactions

          



           Type       Description Substance  Reaction   Status     Data Source(s

)

 

           Drug allergy Drug allergy Penicillins                       [facility]

 

           Allergy to substance Allergy to substance Penicillin antibiotic produ

ct                       

[facility] ([facility])



                                                                                
                 



Encounters

          



           Encounter  Providers  Location   Date       Indications Data Source(s

)

 

           Outpatient                       2021 12:00:00 AM EDT consult  

  [facility]

 

                                        consult 

 

                Unlisted evaluation and management service Performer: Gian tovar                 2021

09:57:00 PM EDT - 2021 05:10:00 PM EDT                           [facility] 

([facility])

 

                Unlisted evaluation and management service Performer: Gian tovar                 2021

08:36:00 PM EDT                                     [facility] ([facility])

 

                Outpatient      Attender: Scout Rodriguez MD Main office - [facility] 07/15/2021 

09:30:00 AM EDT                                     JUAN MIGUEL [facility], )

 

                                        BYRON Vargas-BC: [address]

[address], [place] [address], Ph. [phone]                  [facility]: [facility]       2021 12:00:00 AM EDT                     [facility])

 

                                        PIO VargasBC: [address]

[address], [place] [address], Ph. [phone]                  [facility]: [facility]       2021 12:00:00 AM EDT                     [facility] ([facility])

 

                                        PIO VargasBC: [address]

[address], [place] [address], Ph. [phone]                  Attender: Radha Carrasco [facility]       2021 12:00:00 AM EDT                     [facility])

 

                Unlisted evaluation and management service                      

           2021 04:30:00 PM EST - 

2021 10:53:00 PM EST                           [facility] ([facility])

 

           Outpatient Attender: Sammie Markham   2020 02:40:00 PM EST

            [facility] 

([facility])

 

                          Inpatient                 Attender: Angelica James MDAtte

nder: ANGELICA JAMES MDAdmitter: ANGELICA JAMES MD                  [facility]  2020 03:42:00 PM EST - 2021 

10:45:00 AM EST 

PSYCHOACTIVE SUBSTANCE DEPENDENCE       [facility]

 

                                        PSYCHOACTIVE SUBSTANCE DEPENDENCE 

 

                                        Patient discharged. 

 

                Unlisted evaluation and management service Performer: Gian tovar                 2020

08:54:00 PM EST - 2020 07:20:00 PM EST                           [facility] 

([facility])



                                                                                
                                                                                
                          



Medications

          



          Medication Brand Name Start Date Product Form Dose      Route     Admi

nistrative 

Instructions Pharmacy Instructions Status     Indications Reaction   Description

 Data 

Source(s)

 

                          Buprenorphine 12 MG / Naloxone 3 MG Oral Strip Bupreno

rphine 

Hydrochloride/Naloxone Hydrochloride 2020 12:00:00 AM EST                 

          SUBLINGUAL    

                      active                                      [facility] ([facility])

 

                    Clonidine Hydrochloride 0.1 MG Oral Tablet Clonidine HCL    

   2020 12:00:00 AM 

EST                                       active                      [facility] ([facility])

 

                    Hydroxyzine Hydrochloride 25 MG Oral Tablet Hydroxyzine HCL 

    2020 12:00:00 

AM EST                                    active                      [facility] ([facility])

 

                                        Buprenorphine 2 MG / Naloxone 0.5 MG Ora

l Strip buprenorphine 2 mg-naloxone 0.5 

mg sublingual film PLACE ONE FILM UNDER THE TONGUE TWICE A DAY  MAXIMUM DAILY 
DOSE   2 FILMS                          buprenorphine 2 mg-naloxone 0.5 mg subli

ngual film PLACE ONE FILM

UNDER THE TONGUE TWICE A DAY  MAXIMUM DAILY DOSE   2 FILMS                      

                                       completed

                                                buprenorphine 2 MG / naloxone 0.

5 MG Sublingual Film [facility])

 

                                        Nicotine 4 MG Chewing Gum nicotine (eladio

crilex) 4 mg gum CHEW 1 PIECE OF GUM 

EVERY HOUR AS NEEDED FOR NICOTINE CRAVINGS nicotine (polacrilex) 4 mg gum CHEW 1

PIECE OF GUM EVERY HOUR AS NEEDED FOR NICOTINE CRAVINGS                         

                        completed         

                          nicotine 4 MG Chewing Gum [place] ([facility])

 

                                        Buprenorphine 2 MG / Naloxone 0.5 MG Ora

l Strip buprenorphine 2 mg-naloxone 0.5 

mg sublingual film PLACE ONE FILM UNDER THE TONGUE TWICE A DAY  MAXIMUM DAILY 
DOSE   2 FILMS                          buprenorphine 2 mg-naloxone 0.5 mg subli

ngual film PLACE ONE FILM

UNDER THE TONGUE TWICE A DAY  MAXIMUM DAILY DOSE   2 FILMS                      

                                       completed

                                                buprenorphine 2 MG / naloxone 0.

5 MG Sublingual Film [facility] ([facility])

 

                                        quetiapine 50 MG Oral Tablet quetiapine 

50 mg tablet TAKE ONE TABLET BY MOUTH AT

BEDTIME quetiapine 50 mg tablet TAKE ONE TABLET BY MOUTH AT BEDTIME             

                                    

completed                                       quetiapine 50 MG Oral Tablet ATH

[facility] ([facility])

 

                                        Ibuprofen 800 MG Oral Tablet ibuprofen 8

00 mg tablet TAKE ONE TABLET BY MOUTH 

EVERY 6 HOURS AS NEEDED FOR PAIN        ibuprofen 800 mg tablet TAKE ONE TABLET 

BY 

MOUTH EVERY 6 HOURS AS NEEDED FOR PAIN                                          

 completed               ibuprofen 800 

MG Oral Tablet                          [facility] ([facility])

 

                                        Levofloxacin 250 MG Oral Tablet levoflox

acin 250 mg tablet TAKE ONE TABLET BY 

MOUTH DAILY levofloxacin 250 mg tablet TAKE ONE TABLET BY MOUTH DAILY           

                                        

             completed                              levofloxacin 250 MG Oral Tab

let [facility] ([facility])

 

                                        Cephalexin 500 MG Oral Capsule cephalexi

n 500 mg capsule TAKE ONE CAPSULE BY 

MOUTH THREE TIMES A DAY FOR 10 DAYS     cephalexin 500 mg capsule TAKE ONE CAPSU

LE 

BY MOUTH THREE TIMES A DAY FOR 10 DAYS                                          

 completed               cephalexin 500 

MG Oral Capsule                         [facility] ([facility])

 

                                        Buprenorphine 4 MG / Naloxone 1 MG Oral 

Strip buprenorphine 4 mg-naloxone 1 mg 

sublingual film PLACE ONE FILM UNDER THE TONGUE TWICE A DAY  MAXIMUM DAILY DOSE 
 2 FILMS                                buprenorphine 4 mg-naloxone 1 mg subling

ual film PLACE ONE FILM UNDER 

THE TONGUE TWICE A DAY  MAXIMUM DAILY DOSE   2 FILMS                            

               completed               

buprenorphine 4 MG / naloxone 1 MG Sublingual Film [place] ([facility])

 

        Tab-A-Violeta 400 mcg tablet TAKE ONE TABLET BY MOUTH EVERY DAY [id number]     

                                             

completed                                       Tab-A-Violeta 400 mcg tablet [place]

 ([facility])

 

                                        Cephalexin 500 MG Oral Capsule cephalexi

n 500 mg capsule TAKE ONE CAPSULE BY 

MOUTH THREE TIMES A DAY FOR 10 DAYS     cephalexin 500 mg capsule TAKE ONE CAPSU

LE 

BY MOUTH THREE TIMES A DAY FOR 10 DAYS                                          

 completed               cephalexin 500 

MG Oral Capsule                         [facility] ([facility])

 

                                        Clotrimazole 10 MG/ML Topical Cream clot

rimazole 1 % topical cream APPLY 

TOPICALLY TO FEET TWO TIMES A DAY       clotrimazole 1 % topical cream APPLY TOP

ICALLY

TO FEET TWO TIMES A DAY                                           completed     

          clotrimazole 10 MG/ML Topical 

Cream                                   [facility] ([facility])

 

                                        Buprenorphine 4 MG / Naloxone 1 MG Oral 

Strip buprenorphine 4 mg-naloxone 1 mg 

sublingual film PLACE ONE FILM UNDER THE TONGUE TWICE A DAY  MAXIMUM DAILY DOSE 
 2 FILMS                                buprenorphine 4 mg-naloxone 1 mg subling

ual film PLACE ONE FILM UNDER 

THE TONGUE TWICE A DAY  MAXIMUM DAILY DOSE   2 FILMS                            

               completed               

buprenorphine 4 MG / naloxone 1 MG Sublingual Film [place] ([facility])

 

                                        Doxycycline Monohydrate 100 MG Oral Caps

ule doxycycline monohydrate 100 mg 

capsule TAKE ONE CAPSULE BY MOUTH EVERY 12 HOURS doxycycline monohydrate 100 mg 

capsule TAKE ONE CAPSULE BY MOUTH EVERY 12 HOURS                                

           completed               

doxycycline monohydrate 100 MG Oral Capsule [place] ([facility])

 

        Tab-A-Violeta 400 mcg tablet TAKE ONE TABLET BY MOUTH EVERY DAY [id number]     

                                             

completed                                       Tab-A-Violeta 400 mcg tablet [place]

 ([facility])

 

                                        buspirone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          

 [facility]  

 

                                        Reference Range:  <10 ng/mL:    Deficien

t  10-30 ng/mL:  Insufficient   

ng/mL: Sufficient  >100 ng/mL:   Toxicity possible 









                    ID                  Date                Data Source

 

                    [id number] 2020 10:54:00 AM EST [facility]









          Name      Value     Range     Interpretation Code Description Data [place]

rce(s) Supporting 

Document(s)

 

           Syphilis Serology            Nonreactive Normal (applies to non-numer

ic results)            [facility]                        









                    ID                  Date                Data Source

 

                    [id number] 2020 10:01:00 AM EST [facility]









          Name      Value     Range     Interpretation Code Description Data [place]

rce(s) Supporting 

Document(s)

 

           Sodium     139 mmol/L 137-145    Normal (applies to non-numeric resul

ts)            [facility]                         

 

           Potassium             3.5-5.1    Normal (applies to non-numeric resul

ts)            [facility]                                 

 

           Chloride   106 mmol/L      Normal (applies to non-numeric resul

ts)            [facility]                         

 

           Carbon Dioxide CO2            22.0-33.0  Normal (applies to non-numer

ic results)            [facility]                         

 

           Anion Gap             4.0-11.0   Normal (applies to non-numeric resul

ts)            [facility]                                 

 

           BUN        17 mg/dL   9-20       Normal (applies to non-numeric resul

ts)            [facility]                                 

 

           Creatinine            0.80-1.50  Normal (applies to non-numeric resul

ts)            [facility]                                 

 

           GFR        88 mL/min  >60        Normal (applies to non-numeric resul

ts)            [facility]                                 

 

                                        Result based on MDRD formula. 

 

          Glucose Level 110 mg/dL 74-99     Above high normal           [facility]  

 

                                        The reference range is only applicable w

hen fasting. 

 

           Calcium-Uncorrected            8.4-10.2   Normal (applies to non-nume

nikki results)            [facility]                         

 

           Corrected Calcium            8.4-10.2   Normal (applies to non-numeri

c results)            [facility]                         

 

           Bilirubin,Total            0.2-1.3    Normal (applies to non-numeric 

results)            [facility]                         

 

           Bilirubin,Direct            0.0-0.3    Normal (applies to non-numeric

 results)            [facility]                         

 

          SGOT(AST) 79 U/L    17-59     Above high normal           [facility]  

 

           SGPT(ALT)  51 U/L     21-72      Normal (applies to non-numeric resul

ts)            [facility]                                 

 

           Alkaline Phosphatase 71 U/L          Normal (applies to non-num

brigette results)            

[facility]                  

 

                                        Pregnancy can increase Alkaline Phosp le

vels up to 2 times the normal adult 

value.      Normal values for children and adolescents are 2 to 3 times the 
normal adult value. 

 

          CPK       695 U/L       Above high normal           [facility]  

 

           Total Protein            6.3-8.2    Normal (applies to non-numeric re

sults)            [facility]                                

 

           Albumin               3.5-5.0    Normal (applies to non-numeric resul

ts)            [facility]                                 

 

                Thyroid Stimulate Hormone TSH                 0.358-3.740     No

rmal (applies to non-numeric 

results)                                [facility]  









                    ID                  Date                Data Source

 

                    [id number] 2020 10:01:00 AM EST [facility]









          Name      Value     Range     Interpretation Code Description Data [place]

rce(s) Supporting 

Document(s)

 

           Magnesium             1.80-2.40  Normal (applies to non-numeric resul

ts)            [facility]                                 









                    ID                  Date                Data Source

 

                    [id number] 2020 10:01:00 AM [facility]









          Name      Value     Range     Interpretation Code Description Data [place]

rce(s) Supporting 

Document(s)

 

           C-Reactive Protein,Wide Range            <3.00      Above high normal

            [facility]                                 









                    ID                  Date                Data Source

 

                    [id number] 2020 09:31:00 AM EST [facility]









          Name      Value     Range     Interpretation Code Description Data [place]

rce(s) Supporting 

Document(s)

 

           White Blood Count            4.8-10.8   Normal (applies to non-numeri

c results)            [facility]                         

 

           Red Blood Count            4.35-6.08  Normal (applies to non-numeric 

results)            [facility]                         

 

           Hemoglobin            13.0-17.5  Normal (applies to non-numeric resul

ts)            [facility]                                 

 

           Hematocrit            37.7-51.0  Normal (applies to non-numeric resul

ts)            [facility]                                 

 

           Mean Corpuscular Volume            80-94      Normal (applies to non-

numeric results)            [facility]                        

 

             Mean Corpuscular Hemoglobin              27.0-33.0    Normal (appli

es to non-numeric results) 

                          [facility]    

 

           Mean Corpuscular HGB Conc            32.0-36.0  Normal (applies to no

n-numeric results)            

[facility]                  

 

             Red Cell Distribution Width              11.5-14.5    Normal (appli

es to non-numeric results) 

                          [facility]    

 

           Platelet Count 207 X10 3/uL 130-450    Normal (applies to non-numeric

 results)            

[facility]                  

 

           Mean Platelet Volume            9.6-13.1   Normal (applies to non-num

brigette results)            [facility]                        

 

           Imm Grans% (AUTO) 0 %        0-2        Normal (applies to non-numeri

c results)            [facility]                         

 

           Neutrophils % (AUTO) 52 %       40-75      Normal (applies to non-num

brigette results)            [facility]                        

 

           Lymphocytes % (AUTO) 29 %       21-46      Normal (applies to non-num

brigette results)            [facility]                        

 

           Monocytes % (AUTO) 11 %       5-12       Normal (applies to non-numer

ic results)            [facility]                         

 

          Eosinophils % (AUTO) 7 %       1-5       Above high normal           [facility]  

 

           Basophils % (AUTO) 1 %        0-1        Normal (applies to non-numer

ic results)            [facility]                         

 

           Imm Grans# (AUTO)            0.0-0.5    Normal (applies to non-numeri

c results)            [facility]                         

 

           Neutrophils # (AUTO)            1.5-8.1    Normal (applies to non-num

brigette results)            [facility]                         

 

           Lymphocytes # (AUTO)            1.0-3.1    Normal (applies to non-num

brigette results)            [facility]                         

 

           Monocytes # (AUTO)            0.2-1.3    Normal (applies to non-numer

ic results)            [facility]                         

 

           Eosinophils# (AUTO)            0.0-0.5    Normal (applies to non-nume

nikki results)            [facility]                         

 

           Basophils # (AUTO)            0.0-0.1    Normal (applies to non-numer

ic results)            [facility]                         









                    ID                  Date                Data Source

 

                    [id number] 2020 02:28:00 PM EST [facility]









          Name      Value     Range     Interpretation Code Description Data [place]

rce(s) Supporting 

Document(s)

 

             Cannabinoids Confirm,Ur result              .            Very abnor

mal (applies to non-numeric units  

                          [facility]    

 

                                        Carboxy THC GC/MS Conf      92          

       ng/mL Cutoff=10        01  

Performed at:  [address]  [id number]  
: Araceli Reyes MD, Phone:  [phone] 









                    ID                  Date                Data Source

 

                    [id number] 2020 09:06:00 PM EST [facility]









          Name      Value     Range     Interpretation Code Description Data [place]

rce(s) Supporting 

Document(s)

 

           Opiate Screen,Urine            Negative   Normal (applies to non-nume

nikki results)            [facility]                         

 

          Amphetamine Screen,Urine           Negative  Velazquez                  [facility]  

 

                Benzodiazepines Scrn,Ur result                 Negative        N

ormal (applies to non-numeric 

results)                                [facility]  

 

           Cocaine Screen,Urine            Negative   Normal (applies to non-num

brigette results)            [facility]                        

 

           Methadone Screen,Urine            Negative   Normal (applies to non-n

umeric results)            

[facility]                  

 

          Cannabinoid Screen, Ur           Negative  Ha                  [facility]  

 

                                        Therapeutic Drug Ranges for Emergency an

d Rehabilitation                        

      Threshold Levels (ng/mL)  Cocaine                           300  Opiates  
                         300  Cannabinoids                       50  
Barbiturates                      200  Benzodiazepine                    200  
Methadone                         300  Amphetamines                     1000    
All positive findings are presumptive and unconfirmed.    Confirmation of 
positive results are performed only at request of provider.  Unconfirmed results
 must not be used for non-medical purposes (i.e. pre-employment and legal 
purposes) 









                    ID                  Date                Data Source

 

                    [id number]   2020 06:32:00 PM EST [facility]









          Name      Value     Range     Interpretation Code Description Data [place]

rce(s) Supporting 

Document(s)

 

                                        Respiratory specimen severe acute respir

atory syndrome coronavirus 2 

(SARS-CoV-2) RNA                                             [facility]      

 

                                        This lab was ordered by [facility]

oj and reported by [facility]. 









                    ID                  Date                Data Source

 

                    [id number] 2020 06:18:00 PM EST [facility]









          Name      Value     Range     Interpretation Code Description Data [place]

rce(s) Supporting 

Document(s)

 

           SARS-CoV-2 RNA            Negative   Normal (applies to non-numeric r

esults)            [facility]                         

 

                                        Negative results should be treated as pr

esumptive and, if inconsistent with 

clinical signs and symptoms or necessary for patient management, should be 
tested with different authorized or cleared molecular tests. Negative results do
 not preclude SARS-CoV-2 infection and should not be used as the sole basis for 
patient management decisions. Negative results should be considered in the 
context of a patients recent exposures, history and the presence of clinical 
signs and symptoms consistent with COVID-19.     This test has not been FDA 
cleared or approved; this test has been authorized by FDA under an Emergency Use
 Authorization for use by laboratories certified under the Clinical Laboratory 
Improvement Amendments of 1988 (CLIA), 42 U.S.C. 263a, to perform moderate 
complexity/high complexity tests and at the Point of Care (POC), i.e., in 
patient care settings operating under a CLIA Certificate of Waiver, Certificate 
of Compliance, or Certificate of Accreditation.    Factsheets for healthcare 
providers:  https://www.fda.gov/media/513969/download  Factsheets for patients: 
 https://www.fda.gov/media/390923/download    The ID NOW Instrument is a rapid 
molecular in vitro diagnostic test utilizing an isothermal nucleic acid 
amplification technology intended for the qualitative detection of nucleic acid 
from the SARS-CoV-2 viral RNA.    THIS IS A STATE REPORTABLE COMMUNICABLE 
DISEASE.    Manual entry verified  by Cherie Hood 20 1817 









                    ID                  Date                Data Source

 

                    [id number] 2020 11:55:00 PM EST [facility]









          Name      Value     Range     Interpretation Code Description Data [place]

rce(s) Supporting 

Document(s)

 

           Color,Urine            Yellow     Normal (applies to non-numeric resu

lts)            [facility]                                 

 

           Clarity,Urine            Clear      Normal (applies to non-numeric re

sults)            [facility]                                 

 

           Specific Gravity,Urine            1.001-1.030 Normal (applies to non-

numeric results)            

[facility]                  

 

           PH,Urine              5.0-8.0    Normal (applies to non-numeric resul

ts)            [facility]                                 

 

           Protein,Urine            Negative   Normal (applies to non-numeric re

sults)            [facility]                         

 

           Glucose,Urine (UA)            Negative   Normal (applies to non-numer

ic results)            [facility]                         

 

          Ketones,Urine           Negative  Ha                  [facility]

ospital  

 

           Blood,Urine            Negative   Normal (applies to non-numeric resu

lts)            [facility]                                 

 

           Bilirubin,Urine            Negative   Normal (applies to non-numeric 

results)            [facility]                         

 

           Urobilinogen,Urine            Norm 0.2-1 Normal (applies to non-numer

ic results)            [facility]                        

 

           Leukocyte Esterase,Urine            Negative   Normal (applies to non

-numeric results)            

[facility]                  

 

           Nitrite,Urine            Negative   Normal (applies to non-numeric re

sults)            [facility]                         







                                        Procedure

 

                                          



                                                                                
                                                                                
                                                                                
                                                                              



Social History

          No Information                                                        
                                



Vital Signs

          



                    ID                  Date                Data Source

 

                    UNK                                      









           Name       Value      Range      Interpretation Code Description Data

 Source(s)

 

           Systolic blood pressure 104.0 MM[HG]                       104.0 MM[H

G] NETSMART ([facility])

 

           Diastolic blood pressure 61.0 MM[HG]                       61.0 MM[HG

] NETSMART ([facility])

 

           Heart rate 94.0 /MIN                        94.0 /MIN  [facility] ([facility]

 

           Body temperature 97.7 [DEGF]                       97.7 [DEGF] NETSMA

RT ([facility])

 

           Respiratory rate 16.0 /MIN                        16.0 /MIN  NETSMART

 ([facility])

 

           Systolic blood pressure 117.0 MM[HG]                       117.0 MM[H

G] NETSMART ([facility])

 

           Body temperature 36.5 LACEY                         36.5 LACEY   NETSMART

 ([facility])

 

           Diastolic blood pressure 62.0 MM[HG]                       62.0 MM[HG

] NETSMART ([facility])

 

           Heart rate 64.0 /MIN                        64.0 /MIN  [facility]SM[facility] ([facility]

 

           Body temperature 97.8 [DEGF]                       97.8 [DEGF] NETSMA

RT ([facility])

 

           Body temperature 36.6 LACEY                         36.6 LACEY   NETSMART

 ([facility])

 

           Heart rate 90.0 /MIN                        90.0 /MIN  [facility]SM[facility] ([facility]

 

           Respiratory rate 16.0 /MIN                        16.0 /MIN  NETSMART

 ([facility])

 

           Systolic blood pressure 110.0 MM[HG]                       110.0 MM[H

G] NETSMART ([facility])

 

           Diastolic blood pressure 66.0 MM[HG]                       66.0 MM[HG

] NETSMART ([facility])

 

           Body temperature 97.3 [DEGF]                       97.3 [DEGF] NETSMA

RT ([facility])

 

           Body temperature 36.3 LACEY                         36.3 LACEY   NETSMART

 ([facility])

 

           Heart rate 64.0 /MIN                        64.0 /MIN  [facility]SM[facility] ([facility]

 

           Respiratory rate 16.0 /MIN                        16.0 /MIN  NETSMART

 ([facility])

 

           Systolic blood pressure 118.0 MM[HG]                       118.0 MM[H

G] NETSMART ([facility])

 

           Diastolic blood pressure 69.0 MM[HG]                       69.0 MM[HG

] NETSMART ([facility])

 

           Heart rate 86.0 /MIN                        86.0 /MIN  [facility] ([facility]

 

           Systolic blood pressure 110.0 MM[HG]                       110.0 MM[H

G] NETSMART ([facility])

 

           Diastolic blood pressure 69.0 MM[HG]                       69.0 MM[HG

] NETSMART ([facility])

 

           Respiratory rate 17.0 /MIN                        17.0 /MIN  NETSMART

 ([facility])

 

           Respiratory rate 16.0 /MIN                        16.0 /MIN  NETSMART

 ([facility])

 

           Systolic blood pressure 120.0 MM[HG]                       120.0 MM[H

G] NETSMART ([facility])

 

           Diastolic blood pressure 64.0 MM[HG]                       64.0 MM[HG

] NETSMART ([facility])

 

           Heart rate 71.0 /MIN                        71.0 /MIN  [facility]SM[facility] ([facility]

 

           Body temperature 97.5 [DEGF]                       97.5 [DEGF] NETSMA

RT ([facility])

 

           Body temperature 36.4 LACEY                         36.4 LACEY   NETSMART

 ([facility])

 

           Body temperature 98.7 [DEGF]                       98.7 [DEGF] NETSMA

RT ([facility])

 

           Diastolic blood pressure 71.0 MM[HG]                       71.0 MM[HG

] NETSMART ([facility])

 

           Body temperature 37.1 LACEY                         37.1 LACEY   NETSMART

 ([facility])

 

           Body weight 63.6 KG                          63.6 KG    [facility] ([facility])

 

           Body mass index (BMI) [Ratio] 19.0                             19.0  

     NETSMART ([facility])

 

           Heart rate 74.0 /MIN                        74.0 /MIN  [facility]SM[facility] ([facility])

 

           Systolic blood pressure 131.0 MM[HG]                       131.0 MM[H

G] NETSMART ([facility])

 

           Body height 182.9 cm                         182.9 cm   [facility] ([facility])

 

           Respiratory rate 16.0 /MIN                        16.0 /MIN  NETSMART

 ([facility])

 

           Oxygen saturation in Arterial blood by Pulse oximetry 100.0 %        

                  100.0 %    

[facility]SM[facility] ([facility] [facility])

 

           Respiratory rate 12 /min                          12 /min    MEDENT (

[facility], )

 

           Body height 72 [in_i]                        72 [in_i]  MEDENT ([facility], )

 

                                        6'0" 

 

           Body weight 145.00 [lb_av]                       145.00 [lb_av] MEDEN

T ([facility], 

)

 

           Body mass index (BMI) [Ratio] 19.7 kg/m2                       19.7 k

g/m2 MEDENT ([facility], )

 

           Ideal body weight 178 [lb_av]                       178 [lb_av] DESI

T ([facility], 

)

 

           Body height 72 [in_i]                        72 [in_i]  JOHNNIE ([facility])

 

           Diastolic blood pressure 62 mm[Hg]                        62 mm[Hg]  

JOHNNIE ([facility])

 

           Body height 72 [in_i]                        72 [in_i]  JOHNNIE ([facility])

 

           Body mass index (BMI) [Ratio] 19.4 kg/m2                       19.4 k

g/m2 JOHNNIE ([facility])

 

           Systolic blood pressure 98 mm[Hg]                        98 mm[Hg]  [facility]

 

           Body weight 2290 [oz_av]                       2290 [oz_av] JOHNNIE ([facility])

 

           Diastolic blood pressure 62 mm[Hg]                        62 mm[Hg]  

JOHNNIE ([facility])

 

           Body height 72 [in_i]                        72 [in_i]  JOHNNIE ([facility])

 

           Body mass index (BMI) [Ratio] 19.4 kg/m2                       19.4 k

g/m2 JOHNNIE ([facility])

 

           Systolic blood pressure 98 mm[Hg]                        98 mm[Hg]  A

THENA ([facility])

 

           Body weight 2290 [oz_av]                       2290 [oz_av] JOHNNIE ([facility])

 

           Heart rate 72.0 /MIN                        72.0 /MIN  NETSMART ([facility])

 

           Respiratory rate 14.0 /MIN                        14.0 /MIN  NETSMART

 ([facility])

 

           Body temperature 36.4 LACEY                         36.4 LACEY   NETSMART

 ([facility])

 

           Systolic blood pressure 122.0 MM[HG]                       122.0 MM[H

G] NETSMART ([facility])

 

           Diastolic blood pressure 68.0 MM[HG]                       68.0 MM[HG

] NETSMART ([facility])

 

           Body temperature 97.6 [DEGF]                       97.6 [DEGF] NETSMA

RT ([facility])

 

           Body temperature 36.6 LACEY                         36.6 LACEY   NETSMART

 ([facility])

 

           Respiratory rate 17.0 /MIN                        17.0 /MIN  [facility]SMART

 ([facility])

 

           Heart rate 92.0 /MIN                        92.0 /MIN  [facility] ([facility]

 

           Systolic blood pressure 114.0 MM[HG]                       114.0 MM[H

G] NETSMART ([facility])

 

           Diastolic blood pressure 74.0 MM[HG]                       74.0 MM[HG

] NETSMART ([facility])

 

           Body temperature 97.8 [DEGF]                       97.8 [DEGF] NETSMA

RT ([facility])

 

           Body temperature 98.2 [DEGF]                       98.2 [DEGF] NETSMA

RT ([facility])

 

           Respiratory rate 16.0 /MIN                        16.0 /MIN  NETSMART

 ([facility])

 

           Systolic blood pressure 119.0 MM[HG]                       119.0 MM[H

G] NETSMART ([facility])

 

           Diastolic blood pressure 68.0 MM[HG]                       68.0 MM[HG

] NETSMART ([facility])

 

           Body temperature 36.8 LACEY                         36.8 LACEY   NETSMART

 ([facility])

 

           Heart rate 79.0 /MIN                        79.0 /MIN  [facility] ([facility]

 

           Body temperature 97.5 [DEGF]                       97.5 [DEGF] NETSMA

RT ([facility])

 

           Body temperature 36.4 LACEY                         36.4 LACEY   NETSMART

 ([facility])

 

           Heart rate 64.0 /MIN                        64.0 /MIN  [facility] ([facility]

 

           Respiratory rate 16.0 /MIN                        16.0 /MIN  NETSMART

 ([facility])

 

           Systolic blood pressure 107.0 MM[HG]                       107.0 MM[H

G] NETSMART ([facility])

 

           Diastolic blood pressure 60.0 MM[HG]                       60.0 MM[HG

] [facility]SMART ([facility])

 

           Systolic blood pressure 113.0 MM[HG]                       113.0 MM[H

G] [facility]SMART ([facility])

 

           Diastolic blood pressure 63.0 MM[HG]                       63.0 MM[HG

] NETSMART ([facility])

 

           Respiratory rate 16.0 /MIN                        16.0 /MIN  [facility]SMART

 ([facility])

 

           Heart rate 53.0 /MIN                        53.0 /MIN  [facility] ([facility]

 

           Body temperature 98.7 [DEGF]                       98.7 [DEGF] NETSMA

RT ([facility])

 

           Systolic blood pressure 103.0 MM[HG]                       103.0 MM[H

G] NETSMART ([facility])

 

           Diastolic blood pressure 62.0 MM[HG]                       62.0 MM[HG

] NETSMART ([facility])

 

           Body temperature 37.1 LACEY                         37.1 LACEY   NETSMART

 ([facility])

 

           Heart rate 74.0 /MIN                        74.0 /MIN  [facility] ([facility]

 

           Respiratory rate 17.0 /MIN                        17.0 /MIN  NETSMART

 ([facility])

 

           Body temperature 98.7 [DEGF]                       98.7 [DEGF] NETSMA

RT ([facility])

 

           Heart rate 74.0 /MIN                        74.0 /MIN  [facility] ([facility]

 

           Systolic blood pressure 103.0 MM[HG]                       103.0 MM[H

G] NETSMART ([facility])

 

           Body temperature 37.1 LACEY                         37.1 LACEY   NETSMART

 ([facility])

 

           Respiratory rate 17.0 /MIN                        17.0 /MIN  [facility]SMART

 ([facility])

 

           Diastolic blood pressure 62.0 MM[HG]                       62.0 MM[HG

] [facility]SMART ([facility])

 

           Heart rate 73.0 /MIN                        73.0 /MIN  [facility] ([facility]

 

           Respiratory rate 16.0 /MIN                        16.0 /MIN  NETSMART

 ([facility])

 

           Systolic blood pressure 107.0 MM[HG]                       107.0 MM[H

G] NETSMART ([facility])

 

           Body temperature 97.7 [DEGF]                       97.7 [DEGF] NETSMA

RT ([facility])

 

           Diastolic blood pressure 61.0 MM[HG]                       61.0 MM[HG

] NETSMART ([facility])

 

           Body temperature 36.5 LACEY                         36.5 LACEY   NETSMART

 ([facility])

 

           Heart rate 16.0 /MIN                        16.0 /MIN  [facility] ([facility]

 

           Body temperature 98.7 [DEGF]                       98.7 [DEGF] NETSMA

RT ([facility])

 

           Diastolic blood pressure 76.0 MM[HG]                       76.0 MM[HG

] NETSMART ([facility])

 

           Body temperature 37.1 LACEY                         37.1 LACEY   NETSMART

 ([facility])

 

           Respiratory rate 16.0 /MIN                        16.0 /MIN  NETSMART

 ([facility])

 

           Systolic blood pressure 136.0 MM[HG]                       136.0 MM[H

G] NETSMART ([facility])

 

           Respiratory rate 16.0 /MIN                        16.0 /MIN  NETSMART

 ([facility])

 

           Systolic blood pressure 112.0 MM[HG]                       112.0 MM[H

G] NETSMART ([facility])

 

           Body temperature 97.8 [DEGF]                       97.8 [DEGF] NETSMA

RT ([facility])

 

           Body temperature 36.6 LACEY                         36.6 LACEY   NETSMART

 ([facility])

 

           Heart rate 82.0 /MIN                        82.0 /MIN  [facility] ([facility]

 

           Diastolic blood pressure 70.0 MM[HG]                       70.0 MM[HG

] NETSMART ([facility])

 

           Body temperature 36.8 LACEY                         36.8 LACEY   NETSMART

 ([facility])

 

           Heart rate 87.0 /MIN                        87.0 /MIN  [facility] ([facility]

 

           Respiratory rate 18.0 /MIN                        18.0 /MIN  NETSMART

 ([facility])

 

           Systolic blood pressure 115.0 MM[HG]                       115.0 MM[H

G] NETSMART ([facility])

 

           Diastolic blood pressure 75.0 MM[HG]                       75.0 MM[HG

] NETSMART ([facility])

 

           Body temperature 98.2 [DEGF]                       98.2 [DEGF] NETSMA

RT ([facility])

 

           Heart rate 91.0 /MIN                        91.0 /MIN  [facility] ([facility]

 

           Body temperature 36.6 LACEY                         36.6 LACEY   NETSMART

 ([facility])

 

           Body temperature 97.8 [DEGF]                       97.8 [DEGF] NETSMA

RT ([facility])

 

           Respiratory rate 16.0 /MIN                        16.0 /MIN  NETSMART

 ([facility])

 

           Systolic blood pressure 107.0 MM[HG]                       107.0 MM[H

G] NETSMART ([facility])

 

           Diastolic blood pressure 62.0 MM[HG]                       62.0 MM[HG

] NETSMART ([facility])

 

           Oxygen saturation in Arterial blood by Pulse oximetry 99.0 %         

                  99.0 %     NETSMART

 ([facility])









                    ID                  Date                Data Source

 

                    [id number]           2021 09:35:00 AM [facility]









           Name       Value      Range      Interpretation Code Description Data

 Source(s)

 

           Weight Measurement Method 1                                1         

 [facility]

 

           Weight (Calculated Kilograms) 59.42                            59.42 

     [facility]

 

           Weight     2096       [facility]

 

           Temperature Source 7                                7          [facility]

 

           Temperature 97.6                             97.6       [facility]

 

           Respiratory Effort 1                                1          [facility]

 

           Respiratory Rate 16                               16         [facility]

 

           Pulse Assessment Method 4                                4          [facility]

 

           Pulse Rate 66                               66         [facility]

 

           Height (Calculated Centimeters) 180.34                           180.

34     [facility]

 

           Height     71                               71         [facility]

 

           Blood Pressure 130/55                           130/55     [facility]

 

           Body Mass Index (BMI) 18.2                             18.2       [facility]

 

           Weight Measurement Method 1                                1         

 [facility]

 

           Weight (Calculated Kilograms) 59.42                            59.42 

     [facility]

 

           Weight     2096       [facility]

 

           Temperature Source 7                                7          [facility]

 

           Temperature 97.6                             97.6       [facility]

 

           Respiratory Effort 1                                1          [facility]

 

           Respiratory Rate 16                               16         [facility]

 

           Pulse Assessment Method 4                                4          [facility]

 

           Pulse Rate 66                               66         [facility]

 

           Height (Calculated Centimeters) 180.34                           180.

34     [facility]

 

           Height     71                               71         [facility]

 

           Blood Pressure 130/55                           130/55     [facility]

 

           Body Mass Index (BMI) 18.2                             18.2       [facility]

 

           Weight Measurement Method 1                                1         

 [facility]

 

           Weight (Calculated Kilograms) 59.42                            59.42 

     [facility]

 

           Weight     2096       [facility]

 

           Temperature Source 7                                7          [facility]

 

           Temperature 97.6                             97.6       [facility]

 

           Respiratory Effort 1                                1          [facility]

 

           Respiratory Rate 16                               16         [facility]

 

           Pulse Assessment Method 4                                4          [facility]

 

           Pulse Rate 66                               66         [facility]

 

           Height (Calculated Centimeters) 180.34                           180.

34     [facility]

 

           Height     71                               71         [facility]

 

           Blood Pressure 130/55                           130/55     [facility]

 

           Body Mass Index (BMI) 18.2                             18.2       [facility]

 

           Weight Measurement Method 1                                1         

 [facility]

 

           Weight (Calculated Kilograms) 59.42                            59.42 

     [facility]

 

           Weight     2096       [facility]

 

           Temperature Source 7                                7          [facility]

 

           Temperature 96.7                             96.7       [facility]

 

           Respiratory Effort 1                                1          [facility]

 

           Respiratory Rate 16                               16         [facility]

 

           Pulse Assessment Method 4                                4          C

[facility]

 

           Pulse Rate 49                               49         [facility]

 

           Height (Calculated Centimeters) 180.34                           180.

34     [facility]

 

           Height     71                               71         [facility]

 

           Blood Pressure 96/56                            96/56      [facility]

 

           Body Mass Index (BMI) 18.2                             18.2       [facility]

 

           Weight Measurement Method 1                                1         

 [facility]

 

           Weight (Calculated Kilograms) 59.42                            59.42 

     [facility]

 

           Weight     6                             2096       [facility]

 

           Temperature Source 7                                7          [facility]

 

           Temperature 96.7                             96.7       [facility]

 

           Respiratory Effort 1                                1          [facility]

 

           Respiratory Rate 15                               15         [facility]

 

           Pulse Assessment Method 3                                3          [facility]

 

           Pulse Rate 72                               72         [facility]

 

           Height (Calculated Centimeters) 180.34                           180.

34     [facility]

 

           Height     71                               71         [facility]

 

           Blood Pressure 110/68                           110/68     [facility]

 

           Body Mass Index (BMI) 18.2                             18.2       [facility]

 

           Weight Measurement Method 1                                1         

 [facility]

 

           Weight (Calculated Kilograms) 59.42                            59.42 

     [facility]

 

           Weight     2096                             2096       [facility]

 

           Temperature Source 7                                7          [facility]

 

           Temperature 98.0                             98.0       [facility]

 

           Respiratory Effort 1                                1          [facility]

 

           Respiratory Rate 16                               16         [facility]

 

           Pulse Assessment Method 4                                4          [facility]

 

           Pulse Rate 76                               76         [facility]

 

           Height (Calculated Centimeters) 180.34                           180.

34     [facility]

 

           Height     71                               71         [facility]

 

           Blood Pressure 128/60                           128/60     [facility]

 

           Body Mass Index (BMI) 18.2                             18.2       [facility]



                                                                                
                            



Patient Treatment Plan of Care

          



             Planned Activity Planned Date Details      Description  Data Source

(s)

 

             Tab-A-Violeta 400 mcg tablet TAKE ONE TABLET BY MOUTH EVERY DAY       

                                 JOHNNIE ([facility])

 

             Sulfamethoxazole 800 MG / Trimethoprim 160 MG Oral Tablet          

                              [place] ([facility])

 

             quetiapine 50 MG Oral Tablet                                       

 [place] ([facility])

 

             Nicotine 4 MG Chewing Gum                                        AT

[facility] ([facility])

 

                                        Narcan 4 mg/actuation nasal spray SPRAY 

2 SPRAYS  4MG  IN EACH NOSTRIL AS 

DIRECTED FOR SUSPECTED OPOID OVERDOSE AND CALL 911                              

                   [place] ([facility])

 

             methylprednisolone 4 mg tablets in a dose pack USE AS DIRECTED     

                                   JOHNNIE 

([facility])

 

             Levofloxacin 250 MG Oral Tablet                                    

    JOHNNIE ([facility])

 

             Ibuprofen 800 MG Oral Tablet                                       

 J[facility]NNIE ([facility])

 

             Hydroxyzine Hydrochloride 25 MG Oral Tablet                        

                JOHNNIE ([facility])

 

             Doxycycline Monohydrate 100 MG Oral Capsule                        

                JOHNNIE ([facility])

 

             Clotrimazole 10 MG/ML Topical Cream                                

        J[facility]NNIE ([facility])

 

             Cholecalciferol 1000 UNT Oral Tablet                               

         [facility]NNIE ([facility])

 

             Cephalexin 500 MG Oral Capsule                                     

   J[facility]NNIE ([facility])

 

             buspirone hydrochloride 5 MG Oral Tablet                           

             J[facility]NNIE ([facility])

 

             Buprenorphine 8 MG / Naloxone 2 MG Oral Strip                      

                  J[place]NNIE ([facility])

 

             Buprenorphine 4 MG / Naloxone 1 MG Oral Strip                      

                  J[facility]NNIE ([facility])

 

             Buprenorphine 2 MG / Naloxone 0.5 MG Oral Strip                    

                    J[place]NNIE ([facility])

 

             Tab-A-Violeta 400 mcg tablet TAKE ONE TABLET BY MOUTH EVERY DAY       

                                 J[facility]NNIE ([facility])

 

             quetiapine 50 MG Oral Tablet                                       

 J[place]NNIE ([facility])

 

             Nicotine 4 MG Chewing Gum                                        AT

[facility] ([facility])

 

                                        Narcan 4 mg/actuation nasal spray SPRAY 

2 SPRAYS  4MG  IN EACH NOSTRIL AS 

DIRECTED FOR SUSPECTED OPOID OVERDOSE AND CALL 911                              

                   JOHNNIE ([facility])

 

             methylprednisolone 4 mg tablets in a dose pack USE AS DIRECTED     

                                   J[facility]NNIE 

([facility])

 

             Ibuprofen 800 MG Oral Tablet                                       

 J[place]IE ([facility])

 

             Hydroxyzine Hydrochloride 25 MG Oral Tablet                        

                [facility] ([facility])

 

             Doxycycline Monohydrate 100 MG Oral Capsule                        

                JOHNNIE ([facility])

 

             Clotrimazole 10 MG/ML Topical Cream                                

        J[facility]NNIE ([facility])

 

             Cholecalciferol 1000 UNT Oral Tablet                               

         J[facility]NNIE ([facility])

 

             Cephalexin 500 MG Oral Capsule                                     

   J[facility]NNIE ([facility])

 

             buspirone hydrochloride 5 MG Oral Tablet                           

             JOHNNIE ([facility])

 

             Buprenorphine 8 MG / Naloxone 2 MG Oral Strip                      

                  [facility] ([facility])

 

             Buprenorphine 4 MG / Naloxone 1 MG Oral Strip                      

                  J[place]NNIE ([facility])

 

             Buprenorphine 2 MG / Naloxone 0.5 MG Oral Strip                    

                    JOHNNIE ([facility])

## 2022-01-22 ENCOUNTER — HOSPITAL ENCOUNTER (EMERGENCY)
Dept: HOSPITAL 53 - M ED | Age: 27
LOS: 1 days | Discharge: HOME | End: 2022-01-23
Payer: COMMERCIAL

## 2022-01-22 VITALS — HEIGHT: 72 IN | BODY MASS INDEX: 23.2 KG/M2 | WEIGHT: 171.3 LBS

## 2022-01-22 VITALS — SYSTOLIC BLOOD PRESSURE: 123 MMHG | DIASTOLIC BLOOD PRESSURE: 61 MMHG

## 2022-01-22 DIAGNOSIS — F11.10: ICD-10-CM
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
## 2022-01-22 DIAGNOSIS — F17.200: ICD-10-CM

## 2022-01-22 DIAGNOSIS — T76.11XA: ICD-10-CM

## 2022-01-22 DIAGNOSIS — Z88.0: ICD-10-CM
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
## 2022-01-22 DIAGNOSIS — S01.01XA: Primary | ICD-10-CM
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
